# Patient Record
Sex: MALE
[De-identification: names, ages, dates, MRNs, and addresses within clinical notes are randomized per-mention and may not be internally consistent; named-entity substitution may affect disease eponyms.]

---

## 2016-07-05 RX ORDER — LACTULOSE 10 G/15ML
30 SOLUTION ORAL
Qty: 1125 | Refills: 0 | COMMUNITY
Start: 2016-07-05 | End: 2016-08-04

## 2017-01-12 ENCOUNTER — APPOINTMENT (OUTPATIENT)
Dept: NEPHROLOGY | Facility: CLINIC | Age: 72
End: 2017-01-12

## 2017-01-12 VITALS — BODY MASS INDEX: 23.85 KG/M2 | WEIGHT: 182 LBS

## 2017-01-12 VITALS — DIASTOLIC BLOOD PRESSURE: 56 MMHG | SYSTOLIC BLOOD PRESSURE: 103 MMHG

## 2017-01-12 VITALS — DIASTOLIC BLOOD PRESSURE: 45 MMHG | HEART RATE: 79 BPM | SYSTOLIC BLOOD PRESSURE: 90 MMHG

## 2017-01-13 ENCOUNTER — OUTPATIENT (OUTPATIENT)
Dept: OUTPATIENT SERVICES | Facility: HOSPITAL | Age: 72
LOS: 1 days | End: 2017-01-13
Payer: COMMERCIAL

## 2017-01-13 DIAGNOSIS — Z90.49 ACQUIRED ABSENCE OF OTHER SPECIFIED PARTS OF DIGESTIVE TRACT: Chronic | ICD-10-CM

## 2017-01-13 DIAGNOSIS — R06.02 SHORTNESS OF BREATH: ICD-10-CM

## 2017-01-13 PROCEDURE — 93018 CV STRESS TEST I&R ONLY: CPT

## 2017-01-13 PROCEDURE — 93016 CV STRESS TEST SUPVJ ONLY: CPT

## 2017-01-13 PROCEDURE — A9505: CPT

## 2017-01-13 PROCEDURE — 93017 CV STRESS TEST TRACING ONLY: CPT

## 2017-01-13 PROCEDURE — 78452 HT MUSCLE IMAGE SPECT MULT: CPT | Mod: 26

## 2017-01-13 PROCEDURE — A9500: CPT

## 2017-01-13 PROCEDURE — 78452 HT MUSCLE IMAGE SPECT MULT: CPT

## 2017-02-02 ENCOUNTER — APPOINTMENT (OUTPATIENT)
Dept: NEPHROLOGY | Facility: CLINIC | Age: 72
End: 2017-02-02

## 2017-02-02 VITALS — HEART RATE: 75 BPM | DIASTOLIC BLOOD PRESSURE: 51 MMHG | SYSTOLIC BLOOD PRESSURE: 101 MMHG

## 2017-02-02 VITALS — HEIGHT: 60 IN | DIASTOLIC BLOOD PRESSURE: 63 MMHG | SYSTOLIC BLOOD PRESSURE: 113 MMHG

## 2017-03-02 ENCOUNTER — APPOINTMENT (OUTPATIENT)
Dept: HEART AND VASCULAR | Facility: CLINIC | Age: 72
End: 2017-03-02

## 2017-03-02 ENCOUNTER — APPOINTMENT (OUTPATIENT)
Dept: NEPHROLOGY | Facility: CLINIC | Age: 72
End: 2017-03-02

## 2017-03-02 VITALS — SYSTOLIC BLOOD PRESSURE: 116 MMHG | DIASTOLIC BLOOD PRESSURE: 64 MMHG

## 2017-03-02 VITALS — HEART RATE: 75 BPM | DIASTOLIC BLOOD PRESSURE: 54 MMHG | SYSTOLIC BLOOD PRESSURE: 107 MMHG

## 2017-03-02 VITALS — BODY MASS INDEX: 922.79 KG/M2 | WEIGHT: 189 LBS

## 2017-03-07 RX ORDER — CHLORHEXIDINE GLUCONATE 213 G/1000ML
1 SOLUTION TOPICAL ONCE
Qty: 0 | Refills: 0 | Status: DISCONTINUED | OUTPATIENT
Start: 2017-03-08 | End: 2017-03-08

## 2017-03-07 NOTE — H&P ADULT - NSHPREVIEWOFSYSTEMS_GEN_ALL_CORE
GENERAL, CONSTITUTIONAL : denies recent weight loss, Fever, Chills  EYES, VISION: denies changes in vision   EARS, NOSE, THROAT: denies hearing loss  HEART, CARDIOVASCULAR: denies chest pain, arrhythmia, palpitations, SOB,  LE edema, claudication.   RESPIRATORY: +admits cough, Denies cough, SOB, wheezing  GASTROINTESTINAL:  Denies abdominal pain, heartburn, bloody stool.   GENITOURINARY: Denies frequent urination, urgency  MUSCULOSKELETAL denies joint pain or swelling, restricted motion, musculoskeletal pain.   SKIN & INTEGUMENTARY Denies rashes, sores, blisters, blisters, growths.  NEUROLOGICAL: Denies numbness or tingling sensations, sensation loss, burning.   PSYCHIATRIC: Denies nervousness, anxiety, depression  ENDOCRINE Denies heat or cold intolerance, excessive thirst  HEMATOLOGIC/LYMPHATIC: Denies abnormal bleeding, bleeding of any kind GENERAL, CONSTITUTIONAL : denies recent weight loss, Fever, Chills  EYES, VISION: denies changes in vision   EARS, NOSE, THROAT: denies hearing loss  HEART, CARDIOVASCULAR: +SOB/pleuritic chest discomfort, denies arrhythmia, palpitations, LE edema, claudication.   RESPIRATORY: +admits cough, Denies wheezing  GASTROINTESTINAL:  Denies abdominal pain, heartburn, bloody stool.   GENITOURINARY: Denies frequent urination, urgency  MUSCULOSKELETAL denies joint pain or swelling, restricted motion, musculoskeletal pain.   SKIN & INTEGUMENTARY Denies rashes, sores, blisters, blisters, growths.  NEUROLOGICAL: Denies numbness or tingling sensations, sensation loss, burning.   PSYCHIATRIC: Denies nervousness, anxiety, depression  ENDOCRINE Denies heat or cold intolerance, excessive thirst  HEMATOLOGIC/LYMPHATIC: Denies abnormal bleeding, bleeding of any kind

## 2017-03-07 NOTE — H&P ADULT - HISTORY OF PRESENT ILLNESS
SKELETON* SKELETON*             71 y/o M with PMH of PMH of cirrhosis (unknown etiology), splenomegaly, Crohn's disease, COPD, recently hospitalized with a nonanion gap metabolic acidosis 2/2 LILIAN on CKD with diarrhea complicated by UTI with enterococcus that was successfully treated @ [facility] (7/2017) 70 year old male, former smoker, with pmHx of cirrhosis (unknown etiology), prediabetic, amaurosis fugax (2 episodes, most recently years ago, not sure if 2/2 TIA or plaque per pt) splenomegaly, Crohn's disease (no recent exacerbations), loop recorder implant (Dr Hatfield in 2015), COPD (recently had thoracic surgery 12/1/2016 @ [place], 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 (7/2017) presented to his cardiologist for pre-operative clearance for a pending liver transplant. Pt underwent a Lexiscan-NST (1/13/17) which revealed normal perfusion and an overall left ventricular systolic function that is normal, EKG stress test is normal. Despite a normal stress test, patient continues to have some fatigue when walking fast and it resolves when he slows down, he attributes likely to his baseline COPD. As per patient's wife, pt is undergoing a left cardiac catheterization by request of the [facility] Transplant List to be considered a liver transplant. 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 Pt underwent a Lexiscan-NST (1/13/17) which revealed normal perfusion and an overall left ventricular systolic function that is normal, EKG stress test is normal. Despite a normal stress test, patient continues to have some fatigue when walking fast and it resolves when he slows down, he attributes likely to his baseline COPD. As per patient's wife, pt is undergoing a left cardiac catheterization by request of the [facility] Transplant List to be considered a liver transplant. Admits to leg swelling, ascites. Pt denies CP, SOB, palpitations, dizziness, syncope, known CAD, melena, hematochezia, fevers/chills.    Of note: pt endorses a chronic cough with minimal sputum. Pt states it improved after his recent thoracotomy surgery and after course of Z pack and prednisone he finished last week. Denies fevers/chills, recent sick contacts. Pt states he was worked up for TB @ [place] and results were negative.    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 Pt underwent a Lexiscan-NST (1/13/17) which revealed normal perfusion and an overall left ventricular systolic function that is normal, EKG stress test is normal. Despite a normal stress test, patient continues to have some fatigue when walking fast and it resolves when he slows down, he attributes likely to his baseline COPD. Patient states he can barely walk up 1 flight of stairs at a slow pace prior to becoming fatigued and SOB. As per patient's wife, pt is undergoing a left cardiac catheterization by request of the [facility] Transplant List to be considered a liver transplant. Admits to leg swelling, ascites. Pt denies CP, SOB, palpitations, dizziness, syncope, known CAD, melena, hematochezia, fevers/chills. In light of patients risk factors, CCS Angina Class III equivalent symptoms despite normal NST, patient now presents for recommended left heart catheterization prior to clearance for liver transplant surgery.      Of note: pt endorses a chronic cough with minimal sputum. Pt states it improved after his recent thoracotomy surgery and after course of Z pack and prednisone he finished last week. Denies fevers/chills, recent sick contacts. Pt states he was worked up for TB @ [place] and results were negative.    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 Pt underwent a Lexiscan-NST (1/13/17) which revealed normal perfusion and an overall left ventricular systolic function that is normal, EKG stress test is normal. Despite a normal stress test, patient continues to have some fatigue when walking fast and it resolves when he slows down, he attributes likely to his baseline COPD. Patient states he can barely walk up 1 flight of stairs at a slow pace prior to becoming fatigued and SOB. As per patient's wife, pt is undergoing a left cardiac catheterization by request of the [facility] Transplant List to be considered a liver transplant. Admits to leg swelling, ascites. Pt denies CP, SOB, palpitations, dizziness, syncope, known CAD, melena, hematochezia, fevers/chills. In light of patients risk factors, CCS Angina Class III equivalent symptoms despite normal NST, patient now presents for recommended left heart catheterization prior to clearance for liver transplant surgery.      Of note: pt endorses a chronic cough with minimal sputum. Pt states it improved after his recent thoracotomy surgery and after course of Z pack and prednisone he finished last week. Denies fevers/chills, recent sick contacts. Pt states he was worked up for TB @ [place] and results were negative.    Echo (4/2016): 60-65%

## 2017-03-07 NOTE — H&P ADULT - NSHPPHYSICALEXAM_GEN_ALL_CORE
Height: 6'2"   Weight: 190lbs      Appearance: well appearing elderly male in NAD  HEENT:   Normal oral mucosa, PERRL, EOMI	  Neck: Supple, no JVD  Cardiovascular: [id number]  Respiratory: CTA B/L, no wheezing/rhonchi noted	  Gastrointestinal:  + BS, soft, NT/ND  Skin: No rashes, No ecchymoses, No cyanosis  Extremities: warm well perfused, no pedal edema  Vascular: Femoral pulses 2+ b/l without bruit, DP 1+ b/l, PT 1+ b/l  Neurologic: no focal neurodeficits noted Height: 6'2"   Weight: 190lbs    BP: 114/55  HR: 80  RR: 16  Temp: 98.2F  O2 sat: 97%    Appearance: well appearing elderly male in NAD  HEENT:   Normal oral mucosa, PERRL, EOMI	  Neck: Supple, no JVD  Cardiovascular: [id number]  Respiratory: CTA B/L with distant BS at bases, no wheezing/rhonchi noted	  Gastrointestinal:  + BS, soft, NT/ND  Skin: No rashes, No ecchymoses, No cyanosis  Extremities: warm well perfused, no pedal edema  Vascular: Femoral pulses 2+ b/l without bruit, DP 1+ b/l, PT 2+ b/l  Neurologic: no focal neurodeficits noted

## 2017-03-07 NOTE — H&P ADULT - PROBLEM SELECTOR PLAN 2
baseline Cr unknown, Cr today.....  will hydrate with NS@100cc/hr prior to procedure. baseline Cr unknown, BUN/Cr 36/1.43, Dr. Moreno made aware. Will hydrate with NS@100cc/hr prior to procedure.

## 2017-03-07 NOTE — H&P ADULT - PMH
Chronic obstructive bronchitis    Cirrhosis    Crohn disease    H/O squamous cell carcinoma    Other ascites    Spleen enlarged Carotid artery disease    Chronic obstructive bronchitis    Cirrhosis    Crohn disease    H/O squamous cell carcinoma    Other ascites    Spleen enlarged

## 2017-03-07 NOTE — H&P ADULT - ASSESSMENT
70 yr old M former smoker with PMHx of prediabetes, carotid artery disease, COPD, CKD, cirrhosis awaiting placement on transplant list, with CCS Angina Class III equivalent symptoms despite recent normal NST, who now presents for recommended cardiac catheterization for further evaluation.    ASA: III              Mallampati: II 70 yr old M former smoker with PMHx of prediabetes, carotid artery disease, COPD, CKD, cirrhosis awaiting placement on transplant list, thrombocytopenia with CCS Angina Class III equivalent symptoms despite recent normal NST, who now presents for recommended cardiac catheterization for further evaluation.    ASA: III              Mallampati: II

## 2017-03-08 ENCOUNTER — OUTPATIENT (OUTPATIENT)
Dept: OUTPATIENT SERVICES | Facility: HOSPITAL | Age: 72
LOS: 1 days | Discharge: NURSING, REHAB OR LONG-TERM CARE | End: 2017-03-08
Payer: COMMERCIAL

## 2017-03-08 DIAGNOSIS — Z98.890 OTHER SPECIFIED POSTPROCEDURAL STATES: Chronic | ICD-10-CM

## 2017-03-08 DIAGNOSIS — D69.6 THROMBOCYTOPENIA, UNSPECIFIED: ICD-10-CM

## 2017-03-08 DIAGNOSIS — I20.9 ANGINA PECTORIS, UNSPECIFIED: ICD-10-CM

## 2017-03-08 DIAGNOSIS — N18.9 CHRONIC KIDNEY DISEASE, UNSPECIFIED: ICD-10-CM

## 2017-03-08 DIAGNOSIS — Z90.49 ACQUIRED ABSENCE OF OTHER SPECIFIED PARTS OF DIGESTIVE TRACT: Chronic | ICD-10-CM

## 2017-03-08 LAB
ALBUMIN SERPL ELPH-MCNC: 2.5 G/DL — LOW (ref 3.4–5)
ALP SERPL-CCNC: 118 U/L — SIGNIFICANT CHANGE UP (ref 40–120)
ALT FLD-CCNC: 52 U/L — HIGH (ref 12–42)
ANION GAP SERPL CALC-SCNC: 10 MMOL/L — SIGNIFICANT CHANGE UP (ref 9–16)
APTT BLD: 31 SEC — SIGNIFICANT CHANGE UP (ref 27.5–37.4)
AST SERPL-CCNC: 24 U/L — SIGNIFICANT CHANGE UP (ref 15–37)
BASOPHILS NFR BLD AUTO: 0.3 % — SIGNIFICANT CHANGE UP (ref 0–2)
BILIRUB DIRECT SERPL-MCNC: 0.42 MG/DL — HIGH
BILIRUB INDIRECT FLD-MCNC: 1.6 MG/DL — HIGH (ref 0.2–1)
BILIRUB SERPL-MCNC: 2 MG/DL — HIGH (ref 0.2–1.2)
BUN SERPL-MCNC: 36 MG/DL — HIGH (ref 7–23)
CALCIUM SERPL-MCNC: 8.6 MG/DL — SIGNIFICANT CHANGE UP (ref 8.5–10.5)
CHLORIDE SERPL-SCNC: 109 MMOL/L — HIGH (ref 96–108)
CHOLEST SERPL-MCNC: 104 MG/DL — SIGNIFICANT CHANGE UP
CK MB CFR SERPL CALC: 3.1 NG/ML — SIGNIFICANT CHANGE UP (ref 0.5–3.6)
CK SERPL-CCNC: 122 U/L — SIGNIFICANT CHANGE UP (ref 39–308)
CO2 SERPL-SCNC: 21 MMOL/L — LOW (ref 22–31)
CREAT SERPL-MCNC: 1.43 MG/DL — HIGH (ref 0.5–1.3)
CRP SERPL-MCNC: 0.44 MG/DL — HIGH
EOSINOPHIL NFR BLD AUTO: 4.2 % — SIGNIFICANT CHANGE UP (ref 0–6)
GLUCOSE SERPL-MCNC: 169 MG/DL — HIGH (ref 70–99)
HBA1C BLD-MCNC: 6.1 % — HIGH (ref 4.8–5.6)
HCT VFR BLD CALC: 31.9 % — LOW (ref 39–50)
HDLC SERPL-MCNC: 49 MG/DL — SIGNIFICANT CHANGE UP
HGB BLD-MCNC: 10.8 G/DL — LOW (ref 13–17)
LIPID PNL WITH DIRECT LDL SERPL: 44 MG/DL — SIGNIFICANT CHANGE UP
LYMPHOCYTES # BLD AUTO: 17.7 % — SIGNIFICANT CHANGE UP (ref 13–44)
MCHC RBC-ENTMCNC: 33.4 PG — SIGNIFICANT CHANGE UP (ref 27–34)
MCHC RBC-ENTMCNC: 33.9 G/DL — SIGNIFICANT CHANGE UP (ref 32–36)
MCV RBC AUTO: 98.8 FL — SIGNIFICANT CHANGE UP (ref 80–100)
MONOCYTES NFR BLD AUTO: 8.5 % — SIGNIFICANT CHANGE UP (ref 2–14)
NEUTROPHILS NFR BLD AUTO: 69.3 % — SIGNIFICANT CHANGE UP (ref 43–77)
PLATELET # BLD AUTO: 56 K/UL — LOW (ref 150–400)
POTASSIUM SERPL-MCNC: 4.3 MMOL/L — SIGNIFICANT CHANGE UP (ref 3.5–5.3)
POTASSIUM SERPL-SCNC: 4.3 MMOL/L — SIGNIFICANT CHANGE UP (ref 3.5–5.3)
PROT SERPL-MCNC: 6.5 G/DL — SIGNIFICANT CHANGE UP (ref 6.4–8.2)
RBC # BLD: 3.23 M/UL — LOW (ref 4.2–5.8)
RBC # FLD: 16 % — SIGNIFICANT CHANGE UP (ref 10.3–16.9)
SODIUM SERPL-SCNC: 140 MMOL/L — SIGNIFICANT CHANGE UP (ref 135–145)
TOTAL CHOLESTEROL/HDL RATIO MEASUREMENT: 2.1 RATIO — SIGNIFICANT CHANGE UP
TRIGL SERPL-MCNC: 55 MG/DL — SIGNIFICANT CHANGE UP
WBC # BLD: 3.6 K/UL — LOW (ref 3.8–10.5)
WBC # FLD AUTO: 3.6 K/UL — LOW (ref 3.8–10.5)

## 2017-03-08 PROCEDURE — 86140 C-REACTIVE PROTEIN: CPT

## 2017-03-08 PROCEDURE — C1760: CPT

## 2017-03-08 PROCEDURE — C1889: CPT

## 2017-03-08 PROCEDURE — 85730 THROMBOPLASTIN TIME PARTIAL: CPT

## 2017-03-08 PROCEDURE — 80076 HEPATIC FUNCTION PANEL: CPT

## 2017-03-08 PROCEDURE — 83036 HEMOGLOBIN GLYCOSYLATED A1C: CPT

## 2017-03-08 PROCEDURE — 80048 BASIC METABOLIC PNL TOTAL CA: CPT

## 2017-03-08 PROCEDURE — C1894: CPT

## 2017-03-08 PROCEDURE — 82553 CREATINE MB FRACTION: CPT

## 2017-03-08 PROCEDURE — 93458 L HRT ARTERY/VENTRICLE ANGIO: CPT | Mod: 26

## 2017-03-08 PROCEDURE — 82550 ASSAY OF CK (CPK): CPT

## 2017-03-08 PROCEDURE — 93010 ELECTROCARDIOGRAM REPORT: CPT

## 2017-03-08 PROCEDURE — 80061 LIPID PANEL: CPT

## 2017-03-08 PROCEDURE — 93005 ELECTROCARDIOGRAM TRACING: CPT

## 2017-03-08 PROCEDURE — 85025 COMPLETE CBC W/AUTO DIFF WBC: CPT

## 2017-03-08 PROCEDURE — C1769: CPT

## 2017-03-08 PROCEDURE — C1887: CPT

## 2017-03-08 PROCEDURE — 93458 L HRT ARTERY/VENTRICLE ANGIO: CPT

## 2017-03-08 RX ORDER — SODIUM CHLORIDE 9 MG/ML
500 INJECTION INTRAMUSCULAR; INTRAVENOUS; SUBCUTANEOUS
Qty: 0 | Refills: 0 | Status: DISCONTINUED | OUTPATIENT
Start: 2017-03-08 | End: 2017-03-08

## 2017-03-08 RX ORDER — TIOTROPIUM BROMIDE 18 UG/1
1 CAPSULE ORAL; RESPIRATORY (INHALATION)
Qty: 0 | Refills: 0 | COMMUNITY

## 2017-03-08 RX ORDER — CHOLECALCIFEROL (VITAMIN D3) 125 MCG
1 CAPSULE ORAL
Qty: 0 | Refills: 0 | COMMUNITY

## 2017-03-08 RX ORDER — MESALAMINE 400 MG
1 TABLET, DELAYED RELEASE (ENTERIC COATED) ORAL
Qty: 0 | Refills: 0 | COMMUNITY

## 2017-03-08 RX ORDER — MAGNESIUM OXIDE 400 MG ORAL TABLET 241.3 MG
1 TABLET ORAL
Qty: 0 | Refills: 0 | COMMUNITY

## 2017-03-08 RX ORDER — SODIUM BICARBONATE 1 MEQ/ML
1 SYRINGE (ML) INTRAVENOUS
Qty: 0 | Refills: 0 | COMMUNITY

## 2017-03-08 RX ADMIN — SODIUM CHLORIDE 100 MILLILITER(S): 9 INJECTION INTRAMUSCULAR; INTRAVENOUS; SUBCUTANEOUS at 08:04

## 2017-03-08 NOTE — PROGRESS NOTE ADULT - SUBJECTIVE AND OBJECTIVE BOX
Procedure: LHC, Angioseal  Indication: CAD, +EST  Complication: none    Result:  1) Stable single vessel coronary artery disease (50% mLAD)  2) Normal LVEDP 6    Plan: Medical management. D/C home. To f/u with Dr. Rodas.

## 2017-03-16 DIAGNOSIS — R94.39 ABNORMAL RESULT OF OTHER CARDIOVASCULAR FUNCTION STUDY: ICD-10-CM

## 2017-03-16 DIAGNOSIS — Z01.810 ENCOUNTER FOR PREPROCEDURAL CARDIOVASCULAR EXAMINATION: ICD-10-CM

## 2017-03-16 DIAGNOSIS — J98.4 OTHER DISORDERS OF LUNG: ICD-10-CM

## 2017-03-16 DIAGNOSIS — I67.9 CEREBROVASCULAR DISEASE, UNSPECIFIED: ICD-10-CM

## 2017-03-16 DIAGNOSIS — I25.110 ATHEROSCLEROTIC HEART DISEASE OF NATIVE CORONARY ARTERY WITH UNSTABLE ANGINA PECTORIS: ICD-10-CM

## 2017-04-06 ENCOUNTER — APPOINTMENT (OUTPATIENT)
Dept: NEPHROLOGY | Facility: CLINIC | Age: 72
End: 2017-04-06

## 2017-04-06 VITALS — HEART RATE: 71 BPM | SYSTOLIC BLOOD PRESSURE: 106 MMHG | DIASTOLIC BLOOD PRESSURE: 56 MMHG

## 2017-04-06 VITALS — SYSTOLIC BLOOD PRESSURE: 110 MMHG | HEART RATE: 75 BPM | DIASTOLIC BLOOD PRESSURE: 69 MMHG

## 2017-04-20 ENCOUNTER — APPOINTMENT (OUTPATIENT)
Dept: OPHTHALMOLOGY | Facility: CLINIC | Age: 72
End: 2017-04-20

## 2017-05-04 ENCOUNTER — APPOINTMENT (OUTPATIENT)
Dept: NEPHROLOGY | Facility: CLINIC | Age: 72
End: 2017-05-04

## 2017-05-04 VITALS — SYSTOLIC BLOOD PRESSURE: 122 MMHG | DIASTOLIC BLOOD PRESSURE: 64 MMHG

## 2017-05-04 VITALS — HEART RATE: 66 BPM | SYSTOLIC BLOOD PRESSURE: 101 MMHG | DIASTOLIC BLOOD PRESSURE: 53 MMHG

## 2017-05-05 ENCOUNTER — DOCUMENTATION ONLY (OUTPATIENT)
Dept: TRANSPLANT | Facility: CLINIC | Age: 72
End: 2017-05-05

## 2017-06-01 ENCOUNTER — APPOINTMENT (OUTPATIENT)
Dept: NEPHROLOGY | Facility: CLINIC | Age: 72
End: 2017-06-01

## 2017-06-09 ENCOUNTER — APPOINTMENT (OUTPATIENT)
Dept: NEPHROLOGY | Facility: CLINIC | Age: 72
End: 2017-06-09

## 2017-06-09 VITALS — BODY MASS INDEX: 922.79 KG/M2 | WEIGHT: 189 LBS

## 2017-06-09 VITALS — SYSTOLIC BLOOD PRESSURE: 95 MMHG | DIASTOLIC BLOOD PRESSURE: 46 MMHG | HEART RATE: 68 BPM

## 2017-06-09 VITALS — DIASTOLIC BLOOD PRESSURE: 57 MMHG | SYSTOLIC BLOOD PRESSURE: 102 MMHG

## 2017-07-19 ENCOUNTER — LABORATORY RESULT (OUTPATIENT)
Age: 72
End: 2017-07-19

## 2017-07-20 ENCOUNTER — APPOINTMENT (OUTPATIENT)
Dept: NEPHROLOGY | Facility: CLINIC | Age: 72
End: 2017-07-20

## 2017-07-20 VITALS — SYSTOLIC BLOOD PRESSURE: 96 MMHG | DIASTOLIC BLOOD PRESSURE: 45 MMHG | HEART RATE: 65 BPM

## 2017-07-20 VITALS — BODY MASS INDEX: 932.56 KG/M2 | WEIGHT: 191 LBS

## 2017-07-20 VITALS — DIASTOLIC BLOOD PRESSURE: 51 MMHG | SYSTOLIC BLOOD PRESSURE: 103 MMHG

## 2017-07-20 DIAGNOSIS — N62 HYPERTROPHY OF BREAST: ICD-10-CM

## 2017-07-20 RX ORDER — SPIRONOLACTONE 25 MG/1
25 TABLET ORAL DAILY
Qty: 90 | Refills: 0 | Status: COMPLETED | COMMUNITY
Start: 2017-02-02 | End: 2017-07-20

## 2017-08-01 LAB
ALBUMIN SERPL ELPH-MCNC: 3.2 G/DL
ALP BLD-CCNC: 101 U/L
ALT SERPL-CCNC: 28 U/L
ANION GAP SERPL CALC-SCNC: 10 MMOL/L
APPEARANCE: CLEAR
AST SERPL-CCNC: 32 U/L
BASOPHILS # BLD AUTO: 0.01 K/UL
BASOPHILS NFR BLD AUTO: 0.4 %
BILIRUB SERPL-MCNC: 1.3 MG/DL
BILIRUBIN URINE: NEGATIVE
BLOOD URINE: ABNORMAL
BSA DERIVED: 1.73 M2
BUN SERPL-MCNC: 36 MG/DL
CALCIUM SERPL-MCNC: 9.3 MG/DL
CHLORIDE SERPL-SCNC: 108 MMOL/L
CO2 SERPL-SCNC: 19 MMOL/L
COLOR: YELLOW
CREAT 24H UR-MCNC: 1.1 G/24 H
CREAT 24H UR-MCNC: 1.1 G/24 H
CREAT ?TM UR-MCNC: 52 MG/DL
CREAT ?TM UR-MCNC: 52 MG/DL
CREAT CL 24H UR+SERPL-VRATE: 45 ML/MIN
CREAT SERPL-MCNC: 1.65 MG/DL
CREAT SERPL-MCNC: 1.65 MG/DL
CREAT SPEC-SCNC: 49 MG/DL
CYSTATIN C SERPL-MCNC: 2.23 MG/L
EOSINOPHIL # BLD AUTO: 0.16 K/UL
EOSINOPHIL NFR BLD AUTO: 5.7 %
GFR/BSA.PRED SERPLBLD CYS-BASED-ARV: 26
GLUCOSE QUALITATIVE U: NORMAL MG/DL
GLUCOSE SERPL-MCNC: 145 MG/DL
HCT VFR BLD CALC: 30.5 %
HGB BLD-MCNC: 10.3 G/DL
IMM GRANULOCYTES NFR BLD AUTO: 0 %
KETONES URINE: NEGATIVE
LEUKOCYTE ESTERASE URINE: NEGATIVE
LYMPHOCYTES # BLD AUTO: 0.58 K/UL
LYMPHOCYTES NFR BLD AUTO: 20.7 %
MAN DIFF?: NORMAL
MCHC RBC-ENTMCNC: 33.8 GM/DL
MCHC RBC-ENTMCNC: 34.6 PG
MCV RBC AUTO: 102.3 FL
MICROALBUMIN 24H UR DL<=1MG/L-MCNC: 0.5 MG/DL
MICROALBUMIN/CREAT 24H UR-RTO: 10 MG/G
MONOCYTES # BLD AUTO: 0.22 K/UL
MONOCYTES NFR BLD AUTO: 7.9 %
NEUTROPHILS # BLD AUTO: 1.83 K/UL
NEUTROPHILS NFR BLD AUTO: 65.3 %
NITRITE URINE: NEGATIVE
PH URINE: 5.5
PHOSPHATE SERPL-MCNC: 3.3 MG/DL
PLATELET # BLD AUTO: 68 K/UL
POTASSIUM SERPL-SCNC: 4.7 MMOL/L
PROT ?TM UR-MCNC: 24 HR
PROT ?TM UR-MCNC: 24 HR
PROT SERPL-MCNC: 6.6 G/DL
PROTEIN URINE: NEGATIVE MG/DL
RBC # BLD: 2.98 M/UL
RBC # FLD: 17.2 %
SODIUM SERPL-SCNC: 137 MMOL/L
SPECIFIC GRAVITY URINE: 1.01
SPECIMEN VOL 24H UR: 2050 ML
SPECIMEN VOL 24H UR: 2050 ML
UROBILINOGEN URINE: NORMAL MG/DL
WBC # FLD AUTO: 2.8 K/UL

## 2017-09-14 ENCOUNTER — APPOINTMENT (OUTPATIENT)
Dept: NEPHROLOGY | Facility: CLINIC | Age: 72
End: 2017-09-14
Payer: COMMERCIAL

## 2017-09-14 VITALS — BODY MASS INDEX: 966.74 KG/M2 | WEIGHT: 198 LBS

## 2017-09-14 VITALS — DIASTOLIC BLOOD PRESSURE: 72 MMHG | SYSTOLIC BLOOD PRESSURE: 101 MMHG | HEART RATE: 72 BPM

## 2017-09-14 PROCEDURE — 99214 OFFICE O/P EST MOD 30 MIN: CPT | Mod: 25

## 2017-09-14 RX ORDER — EPLERENONE 25 MG/1
25 TABLET, COATED ORAL
Qty: 180 | Refills: 3 | Status: DISCONTINUED | COMMUNITY
Start: 2017-07-20 | End: 2017-09-14

## 2017-11-16 ENCOUNTER — APPOINTMENT (OUTPATIENT)
Dept: NEPHROLOGY | Facility: CLINIC | Age: 72
End: 2017-11-16
Payer: COMMERCIAL

## 2017-11-16 VITALS — DIASTOLIC BLOOD PRESSURE: 61 MMHG | HEART RATE: 72 BPM | SYSTOLIC BLOOD PRESSURE: 95 MMHG

## 2017-11-16 DIAGNOSIS — K92.2 GASTROINTESTINAL HEMORRHAGE, UNSPECIFIED: ICD-10-CM

## 2017-11-16 PROCEDURE — 99215 OFFICE O/P EST HI 40 MIN: CPT | Mod: 25

## 2018-01-25 ENCOUNTER — APPOINTMENT (OUTPATIENT)
Dept: NEPHROLOGY | Facility: CLINIC | Age: 73
End: 2018-01-25
Payer: COMMERCIAL

## 2018-01-25 VITALS — DIASTOLIC BLOOD PRESSURE: 57 MMHG | SYSTOLIC BLOOD PRESSURE: 126 MMHG

## 2018-01-25 VITALS — BODY MASS INDEX: 986.26 KG/M2 | WEIGHT: 202 LBS

## 2018-01-25 VITALS — DIASTOLIC BLOOD PRESSURE: 48 MMHG | SYSTOLIC BLOOD PRESSURE: 101 MMHG | HEART RATE: 65 BPM

## 2018-01-25 PROCEDURE — 99214 OFFICE O/P EST MOD 30 MIN: CPT

## 2018-01-25 RX ORDER — MAGNESIUM OXIDE 241.3 MG/1000MG
400 TABLET ORAL TWICE DAILY
Qty: 180 | Refills: 3 | Status: ACTIVE | COMMUNITY
Start: 2018-01-25 | End: 1900-01-01

## 2018-02-27 ENCOUNTER — RX RENEWAL (OUTPATIENT)
Age: 73
End: 2018-02-27

## 2018-02-27 RX ORDER — SPIRONOLACTONE 50 MG/1
50 TABLET ORAL
Qty: 90 | Refills: 0 | Status: DISCONTINUED | COMMUNITY
End: 2018-02-27

## 2018-04-18 ENCOUNTER — APPOINTMENT (OUTPATIENT)
Dept: NEPHROLOGY | Facility: CLINIC | Age: 73
End: 2018-04-18
Payer: COMMERCIAL

## 2018-04-18 VITALS — WEIGHT: 199 LBS | BODY MASS INDEX: 971.62 KG/M2

## 2018-04-18 VITALS — SYSTOLIC BLOOD PRESSURE: 91 MMHG | DIASTOLIC BLOOD PRESSURE: 47 MMHG | HEART RATE: 70 BPM

## 2018-04-18 VITALS — DIASTOLIC BLOOD PRESSURE: 56 MMHG | SYSTOLIC BLOOD PRESSURE: 95 MMHG

## 2018-04-18 PROCEDURE — 99215 OFFICE O/P EST HI 40 MIN: CPT

## 2018-05-03 ENCOUNTER — APPOINTMENT (OUTPATIENT)
Dept: OPHTHALMOLOGY | Facility: CLINIC | Age: 73
End: 2018-05-03

## 2018-06-14 ENCOUNTER — APPOINTMENT (OUTPATIENT)
Dept: NEPHROLOGY | Facility: CLINIC | Age: 73
End: 2018-06-14

## 2018-06-19 ENCOUNTER — APPOINTMENT (OUTPATIENT)
Dept: NEPHROLOGY | Facility: CLINIC | Age: 73
End: 2018-06-19
Payer: COMMERCIAL

## 2018-06-19 VITALS — SYSTOLIC BLOOD PRESSURE: 94 MMHG | HEART RATE: 74 BPM | DIASTOLIC BLOOD PRESSURE: 54 MMHG

## 2018-06-19 PROCEDURE — 99215 OFFICE O/P EST HI 40 MIN: CPT

## 2018-07-25 PROBLEM — I77.9 DISORDER OF ARTERIES AND ARTERIOLES, UNSPECIFIED: Chronic | Status: ACTIVE | Noted: 2017-03-08

## 2018-08-28 ENCOUNTER — APPOINTMENT (OUTPATIENT)
Dept: OPHTHALMOLOGY | Facility: CLINIC | Age: 73
End: 2018-08-28
Payer: COMMERCIAL

## 2018-08-28 PROCEDURE — 92014 COMPRE OPH EXAM EST PT 1/>: CPT

## 2018-08-31 ENCOUNTER — APPOINTMENT (OUTPATIENT)
Dept: NEPHROLOGY | Facility: CLINIC | Age: 73
End: 2018-08-31
Payer: COMMERCIAL

## 2018-08-31 VITALS — WEIGHT: 197 LBS | BODY MASS INDEX: 961.85 KG/M2

## 2018-08-31 VITALS — SYSTOLIC BLOOD PRESSURE: 105 MMHG | DIASTOLIC BLOOD PRESSURE: 49 MMHG | HEART RATE: 65 BPM

## 2018-08-31 PROCEDURE — 99215 OFFICE O/P EST HI 40 MIN: CPT

## 2018-08-31 RX ORDER — SODIUM BICARBONATE
POWDER (GRAM) MISCELLANEOUS
Qty: 90 | Refills: 0 | Status: ACTIVE | COMMUNITY
Start: 2017-02-02

## 2018-11-08 ENCOUNTER — APPOINTMENT (OUTPATIENT)
Dept: NEPHROLOGY | Facility: CLINIC | Age: 73
End: 2018-11-08
Payer: COMMERCIAL

## 2018-11-08 PROCEDURE — 99214 OFFICE O/P EST MOD 30 MIN: CPT

## 2018-11-20 ENCOUNTER — APPOINTMENT (OUTPATIENT)
Dept: OPHTHALMOLOGY | Facility: CLINIC | Age: 73
End: 2018-11-20
Payer: COMMERCIAL

## 2018-11-20 PROCEDURE — 92014 COMPRE OPH EXAM EST PT 1/>: CPT

## 2018-12-28 ENCOUNTER — APPOINTMENT (OUTPATIENT)
Dept: NEPHROLOGY | Facility: CLINIC | Age: 73
End: 2018-12-28
Payer: COMMERCIAL

## 2018-12-28 VITALS — BODY MASS INDEX: 947.2 KG/M2 | WEIGHT: 194 LBS

## 2018-12-28 VITALS — DIASTOLIC BLOOD PRESSURE: 57 MMHG | SYSTOLIC BLOOD PRESSURE: 106 MMHG

## 2018-12-28 VITALS — SYSTOLIC BLOOD PRESSURE: 98 MMHG | DIASTOLIC BLOOD PRESSURE: 44 MMHG | HEART RATE: 62 BPM

## 2018-12-28 PROCEDURE — 99214 OFFICE O/P EST MOD 30 MIN: CPT

## 2019-01-24 ENCOUNTER — APPOINTMENT (OUTPATIENT)
Dept: NEPHROLOGY | Facility: CLINIC | Age: 74
End: 2019-01-24
Payer: COMMERCIAL

## 2019-01-24 VITALS — DIASTOLIC BLOOD PRESSURE: 72 MMHG | HEART RATE: 72 BPM | SYSTOLIC BLOOD PRESSURE: 98 MMHG

## 2019-01-24 VITALS — BODY MASS INDEX: 961.85 KG/M2 | WEIGHT: 197 LBS

## 2019-01-24 DIAGNOSIS — G93.40 ENCEPHALOPATHY, UNSPECIFIED: ICD-10-CM

## 2019-01-24 DIAGNOSIS — K76.9 LIVER DISEASE, UNSPECIFIED: ICD-10-CM

## 2019-01-24 DIAGNOSIS — E87.2 ACIDOSIS: ICD-10-CM

## 2019-01-24 PROCEDURE — 99214 OFFICE O/P EST MOD 30 MIN: CPT

## 2019-01-24 NOTE — PHYSICAL EXAM
[General Appearance - Alert] : alert [General Appearance - In No Acute Distress] : in no acute distress [Sclera] : the sclera and conjunctiva were normal [PERRL With Normal Accommodation] : pupils were equal in size, round, and reactive to light [Extraocular Movements] : extraocular movements were intact [Outer Ear] : the ears and nose were normal in appearance [Oropharynx] : the oropharynx was normal [Neck Appearance] : the appearance of the neck was normal [Neck Cervical Mass (___cm)] : no neck mass was observed [Jugular Venous Distention Increased] : there was no jugular-venous distention [Thyroid Diffuse Enlargement] : the thyroid was not enlarged [Thyroid Nodule] : there were no palpable thyroid nodules [Respiration, Rhythm And Depth] : normal respiratory rhythm and effort [Heart Rate And Rhythm] : heart rate was normal and rhythm regular [Heart Sounds] : normal S1 and S2 [Heart Sounds Gallop] : no gallops [Murmurs] : no murmurs [Heart Sounds Pericardial Friction Rub] : no pericardial rub [Veins - Varicosity Changes] : there were no varicosital changes [Bowel Sounds] : normal bowel sounds [Abdomen Soft] : soft [Abdomen Tenderness] : non-tender [Abdomen Mass (___ Cm)] : no abdominal mass palpated [Cervical Lymph Nodes Enlarged Posterior Bilaterally] : posterior cervical [Cervical Lymph Nodes Enlarged Anterior Bilaterally] : anterior cervical [Supraclavicular Lymph Nodes Enlarged Bilaterally] : supraclavicular [Abnormal Walk] : normal gait [Nail Clubbing] : no clubbing  or cyanosis of the fingernails [Musculoskeletal - Swelling] : no joint swelling seen [Motor Tone] : muscle strength and tone were normal [Skin Color & Pigmentation] : normal skin color and pigmentation [Skin Turgor] : normal skin turgor [] : no rash [Oriented To Time, Place, And Person] : oriented to person, place, and time [Impaired Insight] : insight and judgment were intact [Affect] : the affect was normal [FreeTextEntry1] : spleen and liver palpable. Distended.

## 2019-01-24 NOTE — HISTORY OF PRESENT ILLNESS
[FreeTextEntry1] : •  Encephalopathy controlled. • Hypotension improved, SBP 90 - 100s at home. NO LIGHTHEADEDNESS.  • Bicarbonate 18. * CKD stable, creatinine 1.8. \par \par Spirinolactone: 25 mg altering with 50 mg\par Sodium bicarbonate: 1 teaspoon daily\par \par \par Hepatologist: Genevieve Schafer MD Fax [phone]\par GI: Dr. Crystal\par Urologist: Dr. Tyler Yi [phone]\par Hematologist: Dr. Seng Robles \par Cardiologist: Dr. Casi Montejo\par [place] Nephrologist: Dr. Huerta

## 2019-01-29 ENCOUNTER — APPOINTMENT (OUTPATIENT)
Dept: OPHTHALMOLOGY | Facility: CLINIC | Age: 74
End: 2019-01-29
Payer: COMMERCIAL

## 2019-01-29 DIAGNOSIS — H25.813 COMBINED FORMS OF AGE-RELATED CATARACT, BILATERAL: ICD-10-CM

## 2019-01-29 DIAGNOSIS — H02.88B MEIBOMIAN GLAND DYSFUNCTION RIGHT EYE, UPPER AND LOWER EYELIDS: ICD-10-CM

## 2019-01-29 DIAGNOSIS — H02.88A MEIBOMIAN GLAND DYSFUNCTION RIGHT EYE, UPPER AND LOWER EYELIDS: ICD-10-CM

## 2019-01-29 DIAGNOSIS — H43.813 VITREOUS DEGENERATION, BILATERAL: ICD-10-CM

## 2019-01-29 PROCEDURE — 92014 COMPRE OPH EXAM EST PT 1/>: CPT

## 2019-02-13 ENCOUNTER — OUTPATIENT (OUTPATIENT)
Dept: OUTPATIENT SERVICES | Facility: HOSPITAL | Age: 74
LOS: 1 days | End: 2019-02-13
Payer: COMMERCIAL

## 2019-02-13 DIAGNOSIS — Z01.818 ENCOUNTER FOR OTHER PREPROCEDURAL EXAMINATION: ICD-10-CM

## 2019-02-13 DIAGNOSIS — Z98.890 OTHER SPECIFIED POSTPROCEDURAL STATES: Chronic | ICD-10-CM

## 2019-02-13 DIAGNOSIS — Z90.49 ACQUIRED ABSENCE OF OTHER SPECIFIED PARTS OF DIGESTIVE TRACT: Chronic | ICD-10-CM

## 2019-02-13 PROCEDURE — 93306 TTE W/DOPPLER COMPLETE: CPT | Mod: 26

## 2019-02-13 PROCEDURE — 93306 TTE W/DOPPLER COMPLETE: CPT

## 2019-04-15 ENCOUNTER — INBOUND DOCUMENT (OUTPATIENT)
Age: 74
End: 2019-04-15

## 2019-05-07 ENCOUNTER — APPOINTMENT (OUTPATIENT)
Dept: OPHTHALMOLOGY | Facility: CLINIC | Age: 74
End: 2019-05-07

## 2019-09-11 ENCOUNTER — APPOINTMENT (OUTPATIENT)
Dept: OPHTHALMOLOGY | Facility: CLINIC | Age: 74
End: 2019-09-11
Payer: COMMERCIAL

## 2019-09-11 ENCOUNTER — NON-APPOINTMENT (OUTPATIENT)
Age: 74
End: 2019-09-11

## 2019-09-11 PROCEDURE — 92014 COMPRE OPH EXAM EST PT 1/>: CPT

## 2019-11-25 ENCOUNTER — APPOINTMENT (OUTPATIENT)
Dept: OPHTHALMOLOGY | Facility: CLINIC | Age: 74
End: 2019-11-25
Payer: COMMERCIAL

## 2019-11-25 ENCOUNTER — NON-APPOINTMENT (OUTPATIENT)
Age: 74
End: 2019-11-25

## 2019-11-25 PROCEDURE — 92136 OPHTHALMIC BIOMETRY: CPT | Mod: RT

## 2019-11-25 PROCEDURE — 92014 COMPRE OPH EXAM EST PT 1/>: CPT

## 2020-05-11 ENCOUNTER — NON-APPOINTMENT (OUTPATIENT)
Age: 75
End: 2020-05-11

## 2020-05-12 ENCOUNTER — APPOINTMENT (OUTPATIENT)
Dept: OPHTHALMOLOGY | Facility: CLINIC | Age: 75
End: 2020-05-12
Payer: COMMERCIAL

## 2020-05-12 PROCEDURE — 99212 OFFICE O/P EST SF 10 MIN: CPT | Mod: 95

## 2020-05-13 ENCOUNTER — APPOINTMENT (OUTPATIENT)
Dept: OPHTHALMOLOGY | Facility: AMBULATORY SURGERY CENTER | Age: 75
End: 2020-05-13

## 2020-05-14 ENCOUNTER — APPOINTMENT (OUTPATIENT)
Dept: OPHTHALMOLOGY | Facility: CLINIC | Age: 75
End: 2020-05-14

## 2020-05-18 ENCOUNTER — APPOINTMENT (OUTPATIENT)
Dept: OPHTHALMOLOGY | Facility: CLINIC | Age: 75
End: 2020-05-18

## 2020-07-09 ENCOUNTER — APPOINTMENT (OUTPATIENT)
Dept: OPHTHALMOLOGY | Facility: CLINIC | Age: 75
End: 2020-07-09

## 2020-07-13 ENCOUNTER — APPOINTMENT (OUTPATIENT)
Dept: OPHTHALMOLOGY | Facility: CLINIC | Age: 75
End: 2020-07-13

## 2020-09-22 ENCOUNTER — TRANSCRIPTION ENCOUNTER (OUTPATIENT)
Age: 75
End: 2020-09-22

## 2020-09-23 ENCOUNTER — NON-APPOINTMENT (OUTPATIENT)
Age: 75
End: 2020-09-23

## 2020-09-23 ENCOUNTER — OUTPATIENT (OUTPATIENT)
Dept: OUTPATIENT SERVICES | Facility: HOSPITAL | Age: 75
LOS: 1 days | Discharge: HOME | End: 2020-09-23
Payer: COMMERCIAL

## 2020-09-23 ENCOUNTER — APPOINTMENT (OUTPATIENT)
Dept: OPHTHALMOLOGY | Facility: AMBULATORY SURGERY CENTER | Age: 75
End: 2020-09-23

## 2020-09-23 DIAGNOSIS — Z98.890 OTHER SPECIFIED POSTPROCEDURAL STATES: Chronic | ICD-10-CM

## 2020-09-23 DIAGNOSIS — Z90.49 ACQUIRED ABSENCE OF OTHER SPECIFIED PARTS OF DIGESTIVE TRACT: Chronic | ICD-10-CM

## 2020-09-23 PROCEDURE — 66984 XCAPSL CTRC RMVL W/O ECP: CPT | Mod: RT

## 2020-09-24 ENCOUNTER — NON-APPOINTMENT (OUTPATIENT)
Age: 75
End: 2020-09-24

## 2020-09-24 ENCOUNTER — APPOINTMENT (OUTPATIENT)
Dept: OPHTHALMOLOGY | Facility: CLINIC | Age: 75
End: 2020-09-24
Payer: COMMERCIAL

## 2020-09-24 PROCEDURE — 99024 POSTOP FOLLOW-UP VISIT: CPT

## 2020-10-07 ENCOUNTER — NON-APPOINTMENT (OUTPATIENT)
Age: 75
End: 2020-10-07

## 2020-10-07 ENCOUNTER — APPOINTMENT (OUTPATIENT)
Dept: OPHTHALMOLOGY | Facility: CLINIC | Age: 75
End: 2020-10-07
Payer: COMMERCIAL

## 2020-10-07 PROCEDURE — 92136 OPHTHALMIC BIOMETRY: CPT

## 2020-10-07 PROCEDURE — 99024 POSTOP FOLLOW-UP VISIT: CPT

## 2020-11-10 ENCOUNTER — TRANSCRIPTION ENCOUNTER (OUTPATIENT)
Age: 75
End: 2020-11-10

## 2020-11-11 ENCOUNTER — OUTPATIENT (OUTPATIENT)
Dept: OUTPATIENT SERVICES | Facility: HOSPITAL | Age: 75
LOS: 1 days | Discharge: HOME | End: 2020-11-11
Payer: COMMERCIAL

## 2020-11-11 ENCOUNTER — NON-APPOINTMENT (OUTPATIENT)
Age: 75
End: 2020-11-11

## 2020-11-11 ENCOUNTER — APPOINTMENT (OUTPATIENT)
Dept: OPHTHALMOLOGY | Facility: AMBULATORY SURGERY CENTER | Age: 75
End: 2020-11-11

## 2020-11-11 DIAGNOSIS — Z98.890 OTHER SPECIFIED POSTPROCEDURAL STATES: Chronic | ICD-10-CM

## 2020-11-11 DIAGNOSIS — Z90.49 ACQUIRED ABSENCE OF OTHER SPECIFIED PARTS OF DIGESTIVE TRACT: Chronic | ICD-10-CM

## 2020-11-11 PROCEDURE — 66984 XCAPSL CTRC RMVL W/O ECP: CPT | Mod: LT,79

## 2020-11-12 ENCOUNTER — APPOINTMENT (OUTPATIENT)
Dept: OPHTHALMOLOGY | Facility: CLINIC | Age: 75
End: 2020-11-12
Payer: COMMERCIAL

## 2020-11-12 ENCOUNTER — NON-APPOINTMENT (OUTPATIENT)
Age: 75
End: 2020-11-12

## 2020-11-12 PROCEDURE — 99024 POSTOP FOLLOW-UP VISIT: CPT

## 2020-11-16 ENCOUNTER — NON-APPOINTMENT (OUTPATIENT)
Age: 75
End: 2020-11-16

## 2020-11-16 ENCOUNTER — APPOINTMENT (OUTPATIENT)
Dept: OPHTHALMOLOGY | Facility: CLINIC | Age: 75
End: 2020-11-16
Payer: COMMERCIAL

## 2020-11-16 PROCEDURE — 99024 POSTOP FOLLOW-UP VISIT: CPT

## 2020-12-07 ENCOUNTER — APPOINTMENT (OUTPATIENT)
Dept: OPHTHALMOLOGY | Facility: CLINIC | Age: 75
End: 2020-12-07
Payer: COMMERCIAL

## 2020-12-07 ENCOUNTER — NON-APPOINTMENT (OUTPATIENT)
Age: 75
End: 2020-12-07

## 2020-12-07 PROCEDURE — 99024 POSTOP FOLLOW-UP VISIT: CPT

## 2021-06-02 ENCOUNTER — NON-APPOINTMENT (OUTPATIENT)
Age: 76
End: 2021-06-02

## 2021-06-02 ENCOUNTER — APPOINTMENT (OUTPATIENT)
Dept: OPHTHALMOLOGY | Facility: CLINIC | Age: 76
End: 2021-06-02
Payer: COMMERCIAL

## 2021-06-02 PROCEDURE — 92012 INTRM OPH EXAM EST PATIENT: CPT

## 2021-06-22 ENCOUNTER — APPOINTMENT (OUTPATIENT)
Dept: OPHTHALMOLOGY | Facility: CLINIC | Age: 76
End: 2021-06-22
Payer: COMMERCIAL

## 2021-06-22 ENCOUNTER — NON-APPOINTMENT (OUTPATIENT)
Age: 76
End: 2021-06-22

## 2021-06-22 PROCEDURE — 92014 COMPRE OPH EXAM EST PT 1/>: CPT

## 2022-08-02 ENCOUNTER — NON-APPOINTMENT (OUTPATIENT)
Age: 77
End: 2022-08-02

## 2022-08-02 ENCOUNTER — APPOINTMENT (OUTPATIENT)
Dept: OPHTHALMOLOGY | Facility: CLINIC | Age: 77
End: 2022-08-02

## 2022-08-02 PROCEDURE — 92250 FUNDUS PHOTOGRAPHY W/I&R: CPT

## 2022-08-02 PROCEDURE — 92014 COMPRE OPH EXAM EST PT 1/>: CPT

## 2023-08-07 ENCOUNTER — APPOINTMENT (OUTPATIENT)
Dept: OPHTHALMOLOGY | Facility: CLINIC | Age: 78
End: 2023-08-07
Payer: COMMERCIAL

## 2023-08-07 ENCOUNTER — NON-APPOINTMENT (OUTPATIENT)
Age: 78
End: 2023-08-07

## 2023-08-07 PROCEDURE — 92014 COMPRE OPH EXAM EST PT 1/>: CPT

## 2023-08-25 ENCOUNTER — APPOINTMENT (OUTPATIENT)
Dept: OPHTHALMOLOGY | Facility: CLINIC | Age: 78
End: 2023-08-25

## 2023-08-28 ENCOUNTER — NON-APPOINTMENT (OUTPATIENT)
Age: 78
End: 2023-08-28

## 2023-08-28 ENCOUNTER — APPOINTMENT (OUTPATIENT)
Dept: OPHTHALMOLOGY | Facility: CLINIC | Age: 78
End: 2023-08-28
Payer: COMMERCIAL

## 2023-08-28 PROCEDURE — 92083 EXTENDED VISUAL FIELD XM: CPT

## 2023-09-18 NOTE — ASSESSMENT
[FreeTextEntry1] : # CKD stage 3 stable.\par * The patient has been counseled that chronic kidney disease is a significant condition and regular office followup with me (at least every 2-3 months for now) is essential for monitoring, and that it is their responsibility to make a follow up appointment. The patient has been counseled never to stop taking their medications without discussing it with me or another doctor. The patient has been counseled on risk of acute renal failure and instructed to immediately call and speak with me or go immediately to ER with any severe symptoms, nausea, vomiting, diarrhea, chest pain, or shortness of breath. The patient has been counseled on avoiding NSAIDs. Reducing or avoiding red meat and starting folate 800 mg qd has been advised. A counseling information sheet has been given. All their questions were answered.\par \par # Liver failure/cirrhosis.\par * Follow up with transplant center.\par \par # Labile BP controlled. \par * The patient's blood pressure was checked with the [employer] [id number] using the SPRINT trial protocol after sitting quietly in an empty room with back supported and feet on the floor for 5 minutes. The average of 3 readings were taken. \par * The patient was advised to check their BP at home with an automatic arm cuff, write down the readings, and reach me directly on the phone immediately if they are persistently > 160 systolic or if SBP is less than 90 or if lightheadedness develops. They were advised to bring in all blood pressure readings and medications next visit.\par * The patient has been counseled that they have a a significant medical condition and regular office followup (at least every 2-3 months for now) is essential for monitoring, and that it is their responsibility to make follow up appointments. The patient also understands that they must never stop or change their medications without discussing this with me (or another physician). A counseling information sheet has been given. All their questions were answered. \par \par # Fluid overload.\par * Continue aldactone.\par * Follow weight daily. \par \par # Nephrolithiasis and previous bladder stone.\par * Follow up with urology. \par \par # Acidosis.\par * Recheck bicarb level.\par \par  No

## 2023-10-31 ENCOUNTER — APPOINTMENT (OUTPATIENT)
Dept: NEPHROLOGY | Facility: CLINIC | Age: 78
End: 2023-10-31
Payer: COMMERCIAL

## 2023-10-31 VITALS — DIASTOLIC BLOOD PRESSURE: 82 MMHG | SYSTOLIC BLOOD PRESSURE: 100 MMHG | HEART RATE: 106 BPM

## 2023-10-31 VITALS — DIASTOLIC BLOOD PRESSURE: 78 MMHG | SYSTOLIC BLOOD PRESSURE: 101 MMHG | HEART RATE: 116 BPM

## 2023-10-31 VITALS — BODY MASS INDEX: 742.14 KG/M2 | WEIGHT: 152 LBS

## 2023-10-31 DIAGNOSIS — I95.9 HYPOTENSION, UNSPECIFIED: ICD-10-CM

## 2023-10-31 DIAGNOSIS — N20.0 CALCULUS OF KIDNEY: ICD-10-CM

## 2023-10-31 DIAGNOSIS — R35.89 OTHER POLYURIA: ICD-10-CM

## 2023-10-31 DIAGNOSIS — Z90.49 ACQUIRED ABSENCE OF OTHER SPECIFIED PARTS OF DIGESTIVE TRACT: ICD-10-CM

## 2023-10-31 DIAGNOSIS — K74.60 UNSPECIFIED CIRRHOSIS OF LIVER: ICD-10-CM

## 2023-10-31 DIAGNOSIS — D47.2 MONOCLONAL GAMMOPATHY: ICD-10-CM

## 2023-10-31 PROCEDURE — 36415 COLL VENOUS BLD VENIPUNCTURE: CPT

## 2023-10-31 PROCEDURE — 99204 OFFICE O/P NEW MOD 45 MIN: CPT | Mod: 25

## 2024-02-01 ENCOUNTER — APPOINTMENT (OUTPATIENT)
Dept: NEPHROLOGY | Facility: CLINIC | Age: 79
End: 2024-02-01
Payer: COMMERCIAL

## 2024-02-01 DIAGNOSIS — R68.89 OTHER GENERAL SYMPTOMS AND SIGNS: ICD-10-CM

## 2024-02-01 DIAGNOSIS — K50.90 CROHN'S DISEASE, UNSPECIFIED, W/OUT COMPLICATIONS: ICD-10-CM

## 2024-02-01 DIAGNOSIS — Z94.4 LIVER TRANSPLANT STATUS: ICD-10-CM

## 2024-02-01 PROCEDURE — 99214 OFFICE O/P EST MOD 30 MIN: CPT

## 2024-02-01 NOTE — ASSESSMENT
[FreeTextEntry1] : -- # Hypokalemia and hypomag. * High K diet. * Recheck labs within 1 week.  # CKD stage 4 with proteinuria in setting of liver transplant.  * Likely related to aging, nephrosclerosis, and sirolimus. * No evidence for myeloma or other causes. * Risk of renal biopsy outweighs benefits. * Recheck labs, including cystatin C, monoclonal protein evaluation, urinalysis, and urine albumin quantification. * Therapies for kidney disease: blood pressure control; other evidence-based therapies recommended including exercise, a plant-based lower oxalate diet, and 400 mcg folic acid daily * Cardiovascular disease prevention: counseling on healthy diet, physical activity, weight loss, alcohol limitation, blood pressure control * A counseling information sheet on CKD has been given (which they have been instructed to read). * The patient has been counseled that chronic kidney disease is a significant condition and regular office follow-up with me (at least every 2 weeks for now) is important for monitoring and their health, and that it is their responsibility to make a follow-up appointment. * The patient has been counseled never to stop taking their medications without discussing it with me or another doctor. * The patient has been counseled on avoiding NSAIDs. * The patient has been counseled on risk of worsening kidney function and instructed to immediately call and speak with me and go immediately to ER with any severe symptoms, nausea, vomiting, diarrhea, chest pain, or shortness of breath.  # Kidney stone. * Maintain high fluid intake.

## 2024-02-01 NOTE — HISTORY OF PRESENT ILLNESS
[Home] : at home, [unfilled] , at the time of the visit. [Medical Office: ([facility])___] : at the medical office located in  [FreeTextEntry1] : * 1/25/24 labs. K 3.0. Supplemented with k-dur and now on a high K diet. Cr 2.4. Mg 1.1. Now on mg supplement. * He has chronic diarrhea and short gut syndrome. * Following up [place] [facility].   Previous history (31Oct23): * Not seen for > 3 years. In the intermim he received a hep C liver transplant in 2019. He had previously seen Dr. Huerta and would like a second opinion. He also is scheduled to see another nephrologist at [facility]. * 4 - 5 years ago his creatinine was 1.8. Most recently his creatinine has increased to approx 2.3 and he has multiple episode of labile BP and dehydration (N/V/D) with LILIAN, with creatinine up to 4, by report. Previously on mycophenolate and tacrolimus but this was changed to sirolimus.   CT: R multiple stones measuring up to 1.4. 3mm stone. L: 2mm stone.   * He has chronic diarrhea and short gut syndrome. He urinates only 1.7 liters daily. Urine is occasionally dark.   * BP controlled.  - 110 at home. No lightheadedness. No CP/SOB. Compliant with medications. *   He was evaluated for amyloid/myeloma with bone marrow and heme eval which was negative.   Previous history: - Started on 6MP for Crohn's Disease in 1978, azulfudine from 1978 - 2003, and treated with remicade in 2004. Reportedly kidney disease was not temporally related to these medications. - CKD stage 3 present since at least 2015. His creatinine was 1.36 in 8/13/15 and increased to 1.48 in 1/26/16. At that time, he may have been "slightly dehydrated" due to reflux symptoms, which improved with zantac. He had previously been treated with protonix for 2 weeks only. 3 weeks ago he saw Dr. Tyler Yi for urinary tract infection attributed to bladder diverticulum which possible urinary retention. (Notes to be obtained.) He was treated with flomax and cipro. His symptoms have now resolved. Labs by Dr. Robles on 2/24/16 revealed an improved creatinine of 1.2. No other recent medication changes. CT scan of abdomen is pending (and will be forwarded to me). He has not been told previously of obstructing nephrolithasis. Last passed nephrolithasis 6 months ago, reportedly calcium oxalate. He will increasedd fluid intake to 72 ounces daily. - He sees Dr. Robles for newly diagnosed monoclonal gammopathy but has not been told he has myeloma or any other disorder. A bone marrow biopsy is reportedly not planned (Labs to be obtained.)  Labs from 7/23: 2.4 gram proteinuria. Cr 2.37. (Usual range 2.1 - 2.9.) (He previously had 400 mg albuminuria in 2016.)   On inhalation therapy for MAC.   Meds: Acritretin  Buproprion  Flomax Finasteride Ursodiol Allopurinol 300 Sodium bicarb Spiriva Advair Sirolmus 2 mg once daily  Previous history (24Jan19): -  Encephalopathy controlled. - Hypotension improved, SBP 90 - 100s at home. NO LIGHTHEADEDNESS.  - Bicarbonate 18. * CKD stable, creatinine 1.8.   Spirinolactone: 25 mg altering with 50 mg Sodium bicarbonate: 1 teaspoon daily  Hepatologist: Genevieve Schafer MD Fax [phone] GI: Dr. Crystal Urologist: Dr. Tyler Yi [phone] Hematologist: Dr. Seng Robles  Cardiologist: Dr. Casi Montejo [place] Nephrologist: Dr. Huerta

## 2024-02-07 ENCOUNTER — LABORATORY RESULT (OUTPATIENT)
Age: 79
End: 2024-02-07

## 2024-02-12 ENCOUNTER — TRANSCRIPTION ENCOUNTER (OUTPATIENT)
Age: 79
End: 2024-02-12

## 2024-02-19 LAB
25(OH)D3 SERPL-MCNC: 28.8 NG/ML
ALBUMIN SERPL ELPH-MCNC: 3.7 G/DL
ALP BLD-CCNC: 111 U/L
ALT SERPL-CCNC: 16 U/L
ANION GAP SERPL CALC-SCNC: 14 MMOL/L
APO B SERPL-MCNC: 65 MG/DL
APPEARANCE: CLEAR
AST SERPL-CCNC: 19 U/L
BASOPHILS # BLD AUTO: 0.02 K/UL
BASOPHILS NFR BLD AUTO: 0.4 %
BILIRUB SERPL-MCNC: 0.3 MG/DL
BILIRUBIN URINE: NEGATIVE
BLOOD URINE: ABNORMAL
BUN SERPL-MCNC: 31 MG/DL
CALCIUM SERPL-MCNC: 9.1 MG/DL
CALCIUM SERPL-MCNC: 9.1 MG/DL
CHLORIDE SERPL-SCNC: 102 MMOL/L
CHOLEST SERPL-MCNC: 143 MG/DL
CO2 SERPL-SCNC: 27 MMOL/L
COLOR: YELLOW
CREAT SERPL-MCNC: 2.59 MG/DL
CREAT SPEC-SCNC: 92 MG/DL
CREAT SPEC-SCNC: 92 MG/DL
CREAT/PROT UR: 7.1 RATIO
CYSTATIN C SERPL-MCNC: 2.72 MG/L
EGFR: 25 ML/MIN/1.73M2
EOSINOPHIL # BLD AUTO: 0.06 K/UL
EOSINOPHIL NFR BLD AUTO: 1.3 %
ESTIMATED AVERAGE GLUCOSE: 128 MG/DL
FERRITIN SERPL-MCNC: 168 NG/ML
GFR/BSA.PRED SERPLBLD CYS-BASED-ARV: 19 ML/MIN/1.73M2
GLUCOSE QUALITATIVE U: 250 MG/DL
GLUCOSE SERPL-MCNC: 178 MG/DL
HBA1C MFR BLD HPLC: 6.1 %
HCT VFR BLD CALC: 35.5 %
HDLC SERPL-MCNC: 55 MG/DL
HGB BLD-MCNC: 11.8 G/DL
IMM GRANULOCYTES NFR BLD AUTO: 0.2 %
KETONES URINE: NEGATIVE MG/DL
LDLC SERPL CALC-MCNC: 42 MG/DL
LEUKOCYTE ESTERASE URINE: NEGATIVE
LYMPHOCYTES # BLD AUTO: 1.55 K/UL
LYMPHOCYTES NFR BLD AUTO: 32.7 %
MAGNESIUM SERPL-MCNC: 1.4 MG/DL
MAN DIFF?: NORMAL
MCHC RBC-ENTMCNC: 33.1 PG
MCHC RBC-ENTMCNC: 33.2 GM/DL
MCV RBC AUTO: 99.4 FL
MICROALBUMIN 24H UR DL<=1MG/L-MCNC: 406.3 MG/DL
MICROALBUMIN/CREAT 24H UR-RTO: 4416 MG/G
MONOCYTES # BLD AUTO: 0.35 K/UL
MONOCYTES NFR BLD AUTO: 7.4 %
NEUTROPHILS # BLD AUTO: 2.75 K/UL
NEUTROPHILS NFR BLD AUTO: 58 %
NITRITE URINE: NEGATIVE
NONHDLC SERPL-MCNC: 87 MG/DL
PARATHYROID HORMONE INTACT: 152 PG/ML
PH URINE: 6.5
PHOSPHATE SERPL-MCNC: 3.2 MG/DL
PLATELET # BLD AUTO: 119 K/UL
POTASSIUM SERPL-SCNC: 3.1 MMOL/L
PROT SERPL-MCNC: 6.4 G/DL
PROT UR-MCNC: 651 MG/DL
PROTEIN URINE: 300 MG/DL
RBC # BLD: 3.57 M/UL
RBC # FLD: 16.7 %
SODIUM SERPL-SCNC: 142 MMOL/L
SPECIFIC GRAVITY URINE: 1.02
TRIGL SERPL-MCNC: 306 MG/DL
TSH SERPL-ACNC: 3.77 UIU/ML
UROBILINOGEN URINE: 0.2 MG/DL
VIT B12 SERPL-MCNC: >2000 PG/ML
WBC # FLD AUTO: 4.74 K/UL

## 2024-02-19 RX ORDER — POTASSIUM CHLORIDE 1500 MG/1
20 TABLET, EXTENDED RELEASE ORAL
Qty: 90 | Refills: 3 | Status: ACTIVE | COMMUNITY
Start: 2024-02-19 | End: 1900-01-01

## 2024-02-21 RX ORDER — SPIRONOLACTONE 25 MG/1
25 TABLET ORAL
Qty: 90 | Refills: 3 | Status: DISCONTINUED | COMMUNITY
Start: 1900-01-01 | End: 2024-02-21

## 2024-02-22 ENCOUNTER — TRANSCRIPTION ENCOUNTER (OUTPATIENT)
Age: 79
End: 2024-02-22

## 2024-02-23 ENCOUNTER — APPOINTMENT (OUTPATIENT)
Dept: NEPHROLOGY | Facility: CLINIC | Age: 79
End: 2024-02-23

## 2024-02-23 ENCOUNTER — APPOINTMENT (OUTPATIENT)
Dept: NEPHROLOGY | Facility: CLINIC | Age: 79
End: 2024-02-23
Payer: COMMERCIAL

## 2024-02-23 DIAGNOSIS — E87.70 FLUID OVERLOAD, UNSPECIFIED: ICD-10-CM

## 2024-02-23 DIAGNOSIS — N18.4 CHRONIC KIDNEY DISEASE, STAGE 4 (SEVERE): ICD-10-CM

## 2024-02-23 DIAGNOSIS — Z79.899 OTHER LONG TERM (CURRENT) DRUG THERAPY: ICD-10-CM

## 2024-02-23 DIAGNOSIS — N18.9 CHRONIC KIDNEY DISEASE, UNSPECIFIED: ICD-10-CM

## 2024-02-23 DIAGNOSIS — N20.0 CALCULUS OF KIDNEY: ICD-10-CM

## 2024-02-23 DIAGNOSIS — E87.6 HYPOKALEMIA: ICD-10-CM

## 2024-02-23 PROCEDURE — 99214 OFFICE O/P EST MOD 30 MIN: CPT

## 2024-02-23 NOTE — HISTORY OF PRESENT ILLNESS
[Medical Office: ([facility])___] : at the medical office located in  [Home] : at home, [unfilled] , at the time of the visit. [Verbal consent obtained from patient] : the patient, [unfilled] [FreeTextEntry1] : * Today felt fatigued. Increased diarrhea as he has reduced creon, which he has now increased. Labs being checked today by other physicians. He started K supplement.  Receiving intravenous hydration. * Following up with liver team at [facility]. * CKD stable, creatinine 2.59. * BP labile.  - 130s at home. No lightheadedness. No CP/SOB. Compliant with medications. *   Previous history (01Feb24): * 1/25/24 labs. K 3.0. Supplemented with k-dur and now on a high K diet. Cr 2.4. Mg 1.1. Now on mg supplement. * He has chronic diarrhea and short gut syndrome. * Following up with liver team at [facility].   Previous history (31Oct23): * Not seen for > 3 years. In the interm he received a hep C liver transplant in 2019. He had previously seen Dr. Huerta and would like a second opinion. He also is scheduled to see another nephrologist at [facility]. * 4 - 5 years ago his creatinine was 1.8. Most recently his creatinine has increased to approx 2.3 and he has multiple episode of labile BP and dehydration (N/V/D) with LILIAN, with creatinine up to 4, by report. Previously on mycophenolate and tacrolimus but this was changed to sirolimus.   CT: R multiple stones measuring up to 1.4. 3mm stone. L: 2mm stone.   * He has chronic diarrhea and short gut syndrome. He urinates only 1.7 liters daily. Urine is occasionally dark.   * BP controlled.  - 110 at home. No lightheadedness. No CP/SOB. Compliant with medications. *   He was evaluated for amyloid/myeloma with bone marrow and heme eval which was negative.   Previous history: - Started on 6MP for Crohn's Disease in 1978, azulfudine from 1978 - 2003, and treated with remicade in 2004. Reportedly kidney disease was not temporally related to these medications. - CKD stage 3 present since at least 2015. His creatinine was 1.36 in 8/13/15 and increased to 1.48 in 1/26/16. At that time, he may have been "slightly dehydrated" due to reflux symptoms, which improved with zantac. He had previously been treated with protonix for 2 weeks only. 3 weeks ago he saw Dr. Tyler Yi for urinary tract infection attributed to bladder diverticulum which possible urinary retention. (Notes to be obtained.) He was treated with flomax and cipro. His symptoms have now resolved. Labs by Dr. Robles on 2/24/16 revealed an improved creatinine of 1.2. No other recent medication changes. CT scan of abdomen is pending (and will be forwarded to me). He has not been told previously of obstructing nephrolithasis. Last passed nephrolithasis 6 months ago, reportedly calcium oxalate. He will increasedd fluid intake to 72 ounces daily. - He sees Dr. Robles for newly diagnosed monoclonal gammopathy but has not been told he has myeloma or any other disorder. A bone marrow biopsy is reportedly not planned (Labs to be obtained.)  Labs from 7/23: 2.4 gram proteinuria. Cr 2.37. (Usual range 2.1 - 2.9.) (He previously had 400 mg albuminuria in 2016.)   On inhalation therapy for MAC.   Meds: Acritretin  Buproprion  Flomax Finasteride Ursodiol Allopurinol 300 Sodium bicarb Spiriva Advair Sirolmus 2 mg once daily  Previous history (24Jan19): -  Encephalopathy controlled. - Hypotension improved, SBP 90 - 100s at home. NO LIGHTHEADEDNESS.  - Bicarbonate 18. * CKD stable, creatinine 1.8.   Spirinolactone: 25 mg altering with 50 mg Sodium bicarbonate: 1 teaspoon daily  Hepatologist: Genevieve Schafer MD Fax [phone] GI: Dr. Crystal Urologist: Dr. Tyler Yi [phone] Hematologist: Dr. Seng Robles  Cardiologist: Dr. Casi Montejo [place] Nephrologist: Dr. Huerta

## 2024-02-23 NOTE — ASSESSMENT
[FreeTextEntry1] : -- # Hypokalemia and hypomag. * High K diet. * Cont k-dur. * Labs being checked today.   # CKD stage 4 with proteinuria in setting of liver transplant. * Likely related to aging, nephrosclerosis, and sirolimus. * No evidence for myeloma or other causes. * Risk of renal biopsy outweighs benefits. * Recheck labs next visit, including cystatin C, monoclonal protein evaluation, urinalysis, and urine albumin quantification. * Therapies for kidney disease: blood pressure control; other evidence-based therapies recommended including exercise, a plant-based lower oxalate diet, and 400 mcg folic acid daily * Cardiovascular disease prevention: counseling on healthy diet, physical activity, weight loss, alcohol limitation, blood pressure control * A counseling information sheet on CKD has been given (which they have been instructed to read). * The patient has been counseled that chronic kidney disease is a significant condition and regular office follow-up with me (at least every 2 weeks for now) is important for monitoring and their health, and that it is their responsibility to make a follow-up appointment. * The patient has been counseled never to stop taking their medications without discussing it with me or another doctor. * The patient has been counseled on avoiding NSAIDs. * The patient has been counseled on risk of worsening kidney function and instructed to immediately call and speak with me and go immediately to ER with any severe symptoms, nausea, vomiting, diarrhea, chest pain, or shortness of breath.  # Kidney stone. * Maintain high fluid intake.

## 2024-03-05 ENCOUNTER — APPOINTMENT (OUTPATIENT)
Dept: OPHTHALMOLOGY | Facility: CLINIC | Age: 79
End: 2024-03-05

## 2024-08-12 ENCOUNTER — APPOINTMENT (OUTPATIENT)
Dept: OPHTHALMOLOGY | Facility: CLINIC | Age: 79
End: 2024-08-12

## 2024-12-02 VITALS
OXYGEN SATURATION: 98 % | SYSTOLIC BLOOD PRESSURE: 200 MMHG | TEMPERATURE: 98 F | HEART RATE: 118 BPM | RESPIRATION RATE: 24 BRPM | DIASTOLIC BLOOD PRESSURE: 127 MMHG | WEIGHT: 149.69 LBS

## 2024-12-02 PROCEDURE — 99291 CRITICAL CARE FIRST HOUR: CPT

## 2024-12-02 NOTE — ED ADULT TRIAGE NOTE - CHIEF COMPLAINT QUOTE
fall found on floor 30 min ago by wife. report AMS by EMS. multiple abrasion noted at face, extremities. Denies on blood thinner  Stroke code called 1148PM. MD Dwyer, stroke team on bedside.

## 2024-12-02 NOTE — ED ADULT TRIAGE NOTE - WEIGHT METHOD
H&P Note    Patient Identification:  Name: Beena Vincent  Age: 31 y.o.  Sex: female  :  1991  MRN: [id number]                       Chief Complaint: Gestational diabetes    History of Present Illness:   31-year-old  3 para 2 presents at 39-1/7 weeks for an induction of labor due to gestational diabetes, type A2.  Group B strep status is negative.  Fetal heart tones are reactive.    Problem List:  @[place]@  Past Medical History:  Past Medical History:   Diagnosis Date   • Asthma     inhaler as needed   • Cervical dysplasia    • Depression    • Desmoid tumor     back of right knee- pt desires to proceed with treatments after delivery   • Gestational diabetes    • Gestational hypertension    • Iron deficiency anemia    • Preeclampsia      Past Surgical History:  Past Surgical History:   Procedure Laterality Date   • APPENDECTOMY N/A 10/1/2021    Procedure: APPENDECTOMY LAPAROSCOPIC;  Surgeon: Ahmet Dong Jr., MD;  Location: [facility];  Service: General;  Laterality: N/A;   • KNEE ARTHROSCOPY      AGE 5   • LEEP N/A 2019    Procedure: LOOP ELECTROCAUTERY EXCISION PROCEDURE;  Surgeon: Arsh Ray MD;  Location: [facility];  Service: Obstetrics/Gynecology   • SOFT TISSUE BIOPSY Right 3/23/2022    Procedure: BIOPSY SOFT TISSUE THIGH / KNEE;  Surgeon: Chris Randall MD;  Location: [facility];  Service: Orthopedics;  Laterality: Right;   • WISDOM TOOTH EXTRACTION        Home Meds:  Medications Prior to Admission   Medication Sig Dispense Refill Last Dose   • Blood Glucose Monitoring Suppl (FreeStyle Lite) w/Device kit USE 1 SEVEN TIMES DAILY   3/8/2023   • ferrous sulfate 325 (65 FE) MG tablet Take 1 tablet by mouth 2 (Two) Times a Day With Meals. 60 tablet 5 3/8/2023   • glucose blood test strip Use as instructed 7xs daily for blood sugar 200 each 5 3/8/2023   • glucose monitor monitoring kit To check blood sugars 7x daily 1 each 0 3/8/2023   • Lancets (freestyle)  "lancets Pt to check blood sugar 7xs daily 15 each 12 3/8/2023   • metFORMIN ER (GLUCOPHAGE-XR) 500 MG 24 hr tablet Take 1 tablet by mouth 2 (Two) Times a Day With Meals. 60 tablet 2 3/8/2023   • Prenatal Vit-Fe Fumarate-FA (Prenatabs FA) 29-1 MG tablet Take 1 tablet by mouth Daily. 30 tablet 11 3/8/2023   • albuterol sulfate  (90 Base) MCG/ACT inhaler Inhale 2 puffs Every 4 (Four) Hours As Needed for Wheezing. 18 g 0 More than a month   • ondansetron (ZOFRAN) 4 MG tablet Take 1 tablet by mouth Every 8 (Eight) Hours As Needed for Nausea or Vomiting. 30 tablet 3 More than a month     Current Meds:   [unfilled]  Allergies:  Allergies   Allergen Reactions   • Penicillins Nausea And Vomiting   • Adhesive Tape Rash     \"SURGICAL TAPE\"  AS A CHILD     Immunizations:  Immunization History   Administered Date(s) Administered   • FLUAD TRI 65YR+ 12/08/2011   • Flu Vaccine Intradermal Quad 18-64YR 12/08/2011   • FluLaval/Fluzone >6mos 11/10/2022   • MMR 08/14/2020   • Rho (D) Immune Globulin 05/26/2020, 12/22/2022   • Tdap 08/14/2020   • flucelvax quad pfs =>4 YRS 01/14/2020     Social History:   Social History     Tobacco Use   • Smoking status: Former     Packs/day: 0.50     Years: 4.00     Pack years: 2.00     Types: Cigarettes     Quit date: 2/8/2020     Years since quitting: 3.0   • Smokeless tobacco: Never   Substance Use Topics   • Alcohol use: Not Currently     Comment: socially      Family History:  Family History   Problem Relation Age of Onset   • Diabetes Father    • Arthritis Maternal Grandmother    • Lung cancer Maternal Grandfather    • Heart attack Maternal Grandfather    • Heart disease Maternal Grandfather    • Stroke Maternal Grandfather    • Hyperlipidemia Maternal Grandfather    • Malig Hyperthermia Neg Hx    • Breast cancer Neg Hx    • Ovarian cancer Neg Hx    • Uterine cancer Neg Hx    • Colon cancer Neg Hx         Review of Systems  A comprehensive review of systems was negative.    Objective:  tMax " 24 hrs: Temp (24hrs), Av.2 °F (36.8 °C), Min:98.2 °F (36.8 °C), Max:98.2 °F (36.8 °C)    Vitals Ranges:   Temp:  [98.2 °F (36.8 °C)] 98.2 °F (36.8 °C)  Resp:  [16] 16  Intake and Output Last 3 Shifts:   No intake/output data recorded.    Exam:     General Appearance:    Alert, cooperative, no distress, appears stated age   Head:    Normocephalic, without obvious abnormality, atraumatic   Back:     Symmetric, no curvature, ROM normal, no CVA tenderness   Lungs:     Clear to auscultation bilaterally, respirations unlabored   Chest Wall:    No tenderness or deformity    Heart:    Regular rate and rhythm, S1 and S2 normal, no murmur, rub   or gallop       Abdomen:    Soft, gravid.  There is no epigastric tenderness.  No fundal tenderness.  No suprapubic tenderness.   Genitalia:   60% effaced and 1.5 cm dilated at admission   Rectal:   Not performed today.   Extremities:  Equal in size   Skin:   Skin color, texture, turgor normal, no rashes or lesions   Lymph nodes:   Cervical, supraclavicular, and axillary nodes normal       Data Review:  Nonstress test is reactive  Lab Results (last 24 hours)     Procedure Component Value Units Date/Time    CBC (No Diff) [[id number]]  (Normal) Collected: 23    Specimen: Blood from Arm, Right Updated: 23     WBC 9.02 10*3/mm3      RBC 4.11 10*6/mm3      Hemoglobin 12.0 g/dL      Hematocrit 34.0 %      MCV 82.7 fL      MCH 29.2 pg      MCHC 35.3 g/dL      RDW 12.5 %      RDW-SD 37.3 fl      MPV 10.6 fL      Platelets 219 10*3/mm3         Assessment:    Gestational diabetes      1.  Intrauterine pregnancy at 39-1/7 weeks  2.  Gestational diabetes type A2    Plan:  Induction of labor.    Arsh Ray MD  3/8/2023   actual

## 2024-12-03 ENCOUNTER — INPATIENT (INPATIENT)
Facility: HOSPITAL | Age: 79
LOS: 9 days | Discharge: NURSING, REHAB OR LONG-TERM CARE | DRG: 643 | End: 2024-12-13
Attending: STUDENT IN AN ORGANIZED HEALTH CARE EDUCATION/TRAINING PROGRAM | Admitting: INTERNAL MEDICINE
Payer: COMMERCIAL

## 2024-12-03 ENCOUNTER — RESULT REVIEW (OUTPATIENT)
Age: 79
End: 2024-12-03

## 2024-12-03 DIAGNOSIS — Z90.49 ACQUIRED ABSENCE OF OTHER SPECIFIED PARTS OF DIGESTIVE TRACT: Chronic | ICD-10-CM

## 2024-12-03 DIAGNOSIS — Z98.890 OTHER SPECIFIED POSTPROCEDURAL STATES: Chronic | ICD-10-CM

## 2024-12-03 LAB
ADD ON TEST-SPECIMEN IN LAB: SIGNIFICANT CHANGE UP
ALBUMIN SERPL ELPH-MCNC: 3.1 G/DL — LOW (ref 3.3–5)
ALBUMIN SERPL ELPH-MCNC: 3.1 G/DL — LOW (ref 3.3–5)
ALBUMIN SERPL ELPH-MCNC: 3.2 G/DL — LOW (ref 3.3–5)
ALP SERPL-CCNC: 139 U/L — HIGH (ref 40–120)
ALP SERPL-CCNC: 142 U/L — HIGH (ref 40–120)
ALP SERPL-CCNC: 148 U/L — HIGH (ref 40–120)
ALT FLD-CCNC: 12 U/L — SIGNIFICANT CHANGE UP (ref 10–45)
ALT FLD-CCNC: 12 U/L — SIGNIFICANT CHANGE UP (ref 10–45)
ALT FLD-CCNC: 13 U/L — SIGNIFICANT CHANGE UP (ref 10–45)
ANION GAP SERPL CALC-SCNC: 12 MMOL/L — SIGNIFICANT CHANGE UP (ref 5–17)
ANION GAP SERPL CALC-SCNC: 12 MMOL/L — SIGNIFICANT CHANGE UP (ref 5–17)
ANION GAP SERPL CALC-SCNC: 13 MMOL/L — SIGNIFICANT CHANGE UP (ref 5–17)
ANION GAP SERPL CALC-SCNC: 13 MMOL/L — SIGNIFICANT CHANGE UP (ref 5–17)
ANION GAP SERPL CALC-SCNC: 14 MMOL/L — SIGNIFICANT CHANGE UP (ref 5–17)
APPEARANCE UR: CLEAR — SIGNIFICANT CHANGE UP
APTT BLD: 33.5 SEC — SIGNIFICANT CHANGE UP (ref 24.5–35.6)
AST SERPL-CCNC: 22 U/L — SIGNIFICANT CHANGE UP (ref 10–40)
AST SERPL-CCNC: 23 U/L — SIGNIFICANT CHANGE UP (ref 10–40)
AST SERPL-CCNC: 28 U/L — SIGNIFICANT CHANGE UP (ref 10–40)
BASE EXCESS BLDV CALC-SCNC: -0.5 MMOL/L — SIGNIFICANT CHANGE UP (ref -2–3)
BASE EXCESS BLDV CALC-SCNC: 1.1 MMOL/L — SIGNIFICANT CHANGE UP (ref -2–3)
BASOPHILS # BLD AUTO: 0.03 K/UL — SIGNIFICANT CHANGE UP (ref 0–0.2)
BASOPHILS NFR BLD AUTO: 0.2 % — SIGNIFICANT CHANGE UP (ref 0–2)
BILIRUB SERPL-MCNC: 0.4 MG/DL — SIGNIFICANT CHANGE UP (ref 0.2–1.2)
BILIRUB SERPL-MCNC: 0.6 MG/DL — SIGNIFICANT CHANGE UP (ref 0.2–1.2)
BILIRUB SERPL-MCNC: 0.6 MG/DL — SIGNIFICANT CHANGE UP (ref 0.2–1.2)
BILIRUB UR-MCNC: NEGATIVE — SIGNIFICANT CHANGE UP
BLD GP AB SCN SERPL QL: NEGATIVE — SIGNIFICANT CHANGE UP
BUN SERPL-MCNC: 24 MG/DL — HIGH (ref 7–23)
BUN SERPL-MCNC: 25 MG/DL — HIGH (ref 7–23)
BUN SERPL-MCNC: 25 MG/DL — HIGH (ref 7–23)
CALCIUM SERPL-MCNC: 8.4 MG/DL — SIGNIFICANT CHANGE UP (ref 8.4–10.5)
CALCIUM SERPL-MCNC: 8.6 MG/DL — SIGNIFICANT CHANGE UP (ref 8.4–10.5)
CALCIUM SERPL-MCNC: 8.6 MG/DL — SIGNIFICANT CHANGE UP (ref 8.4–10.5)
CALCIUM SERPL-MCNC: 8.9 MG/DL — SIGNIFICANT CHANGE UP (ref 8.4–10.5)
CALCIUM SERPL-MCNC: 9 MG/DL — SIGNIFICANT CHANGE UP (ref 8.4–10.5)
CHLORIDE SERPL-SCNC: 81 MMOL/L — LOW (ref 96–108)
CHLORIDE SERPL-SCNC: 81 MMOL/L — LOW (ref 96–108)
CHLORIDE SERPL-SCNC: 83 MMOL/L — LOW (ref 96–108)
CHLORIDE SERPL-SCNC: 85 MMOL/L — LOW (ref 96–108)
CHLORIDE SERPL-SCNC: 85 MMOL/L — LOW (ref 96–108)
CK SERPL-CCNC: 256 U/L — HIGH (ref 30–200)
CO2 BLDV-SCNC: 26 MMOL/L — SIGNIFICANT CHANGE UP (ref 22–26)
CO2 BLDV-SCNC: 28 MMOL/L — HIGH (ref 22–26)
CO2 SERPL-SCNC: 23 MMOL/L — SIGNIFICANT CHANGE UP (ref 22–31)
CO2 SERPL-SCNC: 25 MMOL/L — SIGNIFICANT CHANGE UP (ref 22–31)
CO2 SERPL-SCNC: 25 MMOL/L — SIGNIFICANT CHANGE UP (ref 22–31)
COLOR SPEC: YELLOW — SIGNIFICANT CHANGE UP
CREAT ?TM UR-MCNC: 26 MG/DL — SIGNIFICANT CHANGE UP
CREAT SERPL-MCNC: 2.04 MG/DL — HIGH (ref 0.5–1.3)
CREAT SERPL-MCNC: 2.11 MG/DL — HIGH (ref 0.5–1.3)
CREAT SERPL-MCNC: 2.17 MG/DL — HIGH (ref 0.5–1.3)
CREAT SERPL-MCNC: 2.19 MG/DL — HIGH (ref 0.5–1.3)
CREAT SERPL-MCNC: 2.31 MG/DL — HIGH (ref 0.5–1.3)
DIFF PNL FLD: ABNORMAL
EGFR: 28 ML/MIN/1.73M2 — LOW
EGFR: 30 ML/MIN/1.73M2 — LOW
EGFR: 30 ML/MIN/1.73M2 — LOW
EGFR: 31 ML/MIN/1.73M2 — LOW
EGFR: 33 ML/MIN/1.73M2 — LOW
EOSINOPHIL # BLD AUTO: 0.03 K/UL — SIGNIFICANT CHANGE UP (ref 0–0.5)
EOSINOPHIL NFR BLD AUTO: 0.2 % — SIGNIFICANT CHANGE UP (ref 0–6)
ETHANOL SERPL-MCNC: <10 MG/DL — SIGNIFICANT CHANGE UP (ref 0–10)
GAS PNL BLDV: SIGNIFICANT CHANGE UP
GAS PNL BLDV: SIGNIFICANT CHANGE UP
GLUCOSE SERPL-MCNC: 112 MG/DL — HIGH (ref 70–99)
GLUCOSE SERPL-MCNC: 122 MG/DL — HIGH (ref 70–99)
GLUCOSE SERPL-MCNC: 125 MG/DL — HIGH (ref 70–99)
GLUCOSE SERPL-MCNC: 168 MG/DL — HIGH (ref 70–99)
GLUCOSE SERPL-MCNC: 175 MG/DL — HIGH (ref 70–99)
GLUCOSE UR QL: 100 MG/DL
GLUCOSE UR QL: NEGATIVE MG/DL — SIGNIFICANT CHANGE UP
GLUCOSE UR QL: NEGATIVE MG/DL — SIGNIFICANT CHANGE UP
HAV IGM SER-ACNC: SIGNIFICANT CHANGE UP
HBV CORE AB SER-ACNC: SIGNIFICANT CHANGE UP
HBV CORE IGM SER-ACNC: SIGNIFICANT CHANGE UP
HBV SURFACE AB SER-ACNC: ABNORMAL
HBV SURFACE AG SER-ACNC: SIGNIFICANT CHANGE UP
HCO3 BLDV-SCNC: 25 MMOL/L — SIGNIFICANT CHANGE UP (ref 22–29)
HCO3 BLDV-SCNC: 26 MMOL/L — SIGNIFICANT CHANGE UP (ref 22–29)
HCOV PNL SPEC NAA+PROBE: DETECTED
HCT VFR BLD CALC: 31.6 % — LOW (ref 39–50)
HCT VFR BLD CALC: 35.6 % — LOW (ref 39–50)
HCV AB S/CO SERPL IA: 0.07 S/CO — SIGNIFICANT CHANGE UP
HCV AB SERPL-IMP: SIGNIFICANT CHANGE UP
HGB BLD-MCNC: 10.6 G/DL — LOW (ref 13–17)
HGB BLD-MCNC: 11.8 G/DL — LOW (ref 13–17)
HIV 1+2 AB+HIV1 P24 AG SERPL QL IA: SIGNIFICANT CHANGE UP
IMM GRANULOCYTES NFR BLD AUTO: 0.6 % — SIGNIFICANT CHANGE UP (ref 0–0.9)
INR BLD: 1.14 — SIGNIFICANT CHANGE UP (ref 0.85–1.16)
KETONES UR-MCNC: NEGATIVE MG/DL — SIGNIFICANT CHANGE UP
LACTATE SERPL-SCNC: 0.7 MMOL/L — SIGNIFICANT CHANGE UP (ref 0.5–2)
LACTATE SERPL-SCNC: 2.1 MMOL/L — HIGH (ref 0.5–2)
LEUKOCYTE ESTERASE UR-ACNC: NEGATIVE — SIGNIFICANT CHANGE UP
LYMPHOCYTES # BLD AUTO: 24.7 % — SIGNIFICANT CHANGE UP (ref 13–44)
LYMPHOCYTES # BLD AUTO: 3.11 K/UL — SIGNIFICANT CHANGE UP (ref 1–3.3)
MAGNESIUM SERPL-MCNC: 2.6 MG/DL — SIGNIFICANT CHANGE UP (ref 1.6–2.6)
MCHC RBC-ENTMCNC: 30.5 PG — SIGNIFICANT CHANGE UP (ref 27–34)
MCHC RBC-ENTMCNC: 30.5 PG — SIGNIFICANT CHANGE UP (ref 27–34)
MCHC RBC-ENTMCNC: 33.1 G/DL — SIGNIFICANT CHANGE UP (ref 32–36)
MCHC RBC-ENTMCNC: 33.5 G/DL — SIGNIFICANT CHANGE UP (ref 32–36)
MCV RBC AUTO: 91.1 FL — SIGNIFICANT CHANGE UP (ref 80–100)
MCV RBC AUTO: 92 FL — SIGNIFICANT CHANGE UP (ref 80–100)
MONOCYTES # BLD AUTO: 0.69 K/UL — SIGNIFICANT CHANGE UP (ref 0–0.9)
MONOCYTES NFR BLD AUTO: 5.5 % — SIGNIFICANT CHANGE UP (ref 2–14)
NEUTROPHILS # BLD AUTO: 8.64 K/UL — HIGH (ref 1.8–7.4)
NEUTROPHILS NFR BLD AUTO: 68.8 % — SIGNIFICANT CHANGE UP (ref 43–77)
NITRITE UR-MCNC: NEGATIVE — SIGNIFICANT CHANGE UP
NRBC # BLD: 0 /100 WBCS — SIGNIFICANT CHANGE UP (ref 0–0)
NRBC # BLD: 0 /100 WBCS — SIGNIFICANT CHANGE UP (ref 0–0)
NT-PROBNP SERPL-SCNC: HIGH PG/ML (ref 0–300)
OSMOLALITY UR: 247 MOSM/KG — LOW (ref 300–900)
PCO2 BLDV: 37 MMHG — LOW (ref 42–55)
PCO2 BLDV: 51 MMHG — SIGNIFICANT CHANGE UP (ref 42–55)
PH BLDV: 7.32 — SIGNIFICANT CHANGE UP (ref 7.32–7.43)
PH BLDV: 7.44 — HIGH (ref 7.32–7.43)
PH UR: 6.5 — SIGNIFICANT CHANGE UP (ref 5–8)
PHOSPHATE SERPL-MCNC: 3.9 MG/DL — SIGNIFICANT CHANGE UP (ref 2.5–4.5)
PLATELET # BLD AUTO: 138 K/UL — LOW (ref 150–400)
PLATELET # BLD AUTO: 161 K/UL — SIGNIFICANT CHANGE UP (ref 150–400)
PO2 BLDV: 42 MMHG — SIGNIFICANT CHANGE UP (ref 25–45)
PO2 BLDV: 94 MMHG — HIGH (ref 25–45)
POTASSIUM SERPL-MCNC: 2.6 MMOL/L — CRITICAL LOW (ref 3.5–5.3)
POTASSIUM SERPL-MCNC: 2.8 MMOL/L — CRITICAL LOW (ref 3.5–5.3)
POTASSIUM SERPL-MCNC: 2.9 MMOL/L — CRITICAL LOW (ref 3.5–5.3)
POTASSIUM SERPL-MCNC: 3 MMOL/L — LOW (ref 3.5–5.3)
POTASSIUM SERPL-MCNC: 3.1 MMOL/L — LOW (ref 3.5–5.3)
POTASSIUM SERPL-SCNC: 2.6 MMOL/L — CRITICAL LOW (ref 3.5–5.3)
POTASSIUM SERPL-SCNC: 2.8 MMOL/L — CRITICAL LOW (ref 3.5–5.3)
POTASSIUM SERPL-SCNC: 2.9 MMOL/L — CRITICAL LOW (ref 3.5–5.3)
POTASSIUM SERPL-SCNC: 3 MMOL/L — LOW (ref 3.5–5.3)
POTASSIUM SERPL-SCNC: 3.1 MMOL/L — LOW (ref 3.5–5.3)
POTASSIUM UR-SCNC: 7 MMOL/L — SIGNIFICANT CHANGE UP
PROT ?TM UR-MCNC: 195 MG/DL — HIGH (ref 0–12)
PROT SERPL-MCNC: 5.8 G/DL — LOW (ref 6–8.3)
PROT SERPL-MCNC: 5.9 G/DL — LOW (ref 6–8.3)
PROT SERPL-MCNC: 6.7 G/DL — SIGNIFICANT CHANGE UP (ref 6–8.3)
PROT UR-MCNC: 300 MG/DL
PROT UR-MCNC: 300 MG/DL
PROT UR-MCNC: >=1000 MG/DL
PROT/CREAT UR-RTO: 7.5 RATIO — HIGH (ref 0–0.2)
PROTHROM AB SERPL-ACNC: 13.3 SEC — SIGNIFICANT CHANGE UP (ref 9.9–13.4)
RAPID RVP RESULT: DETECTED
RBC # BLD: 3.47 M/UL — LOW (ref 4.2–5.8)
RBC # BLD: 3.87 M/UL — LOW (ref 4.2–5.8)
RBC # FLD: 14.7 % — HIGH (ref 10.3–14.5)
RBC # FLD: 14.9 % — HIGH (ref 10.3–14.5)
RH IG SCN BLD-IMP: POSITIVE — SIGNIFICANT CHANGE UP
S PNEUM AG UR QL: NEGATIVE — SIGNIFICANT CHANGE UP
SAO2 % BLDV: 68.6 % — SIGNIFICANT CHANGE UP (ref 67–88)
SAO2 % BLDV: 97 % — HIGH (ref 67–88)
SARS-COV-2 RNA SPEC QL NAA+PROBE: SIGNIFICANT CHANGE UP
SIROLIMUS BLD-MCNC: 2.7 NG/ML — LOW (ref 4.5–14)
SODIUM SERPL-SCNC: 118 MMOL/L — CRITICAL LOW (ref 135–145)
SODIUM SERPL-SCNC: 118 MMOL/L — CRITICAL LOW (ref 135–145)
SODIUM SERPL-SCNC: 119 MMOL/L — CRITICAL LOW (ref 135–145)
SODIUM SERPL-SCNC: 121 MMOL/L — LOW (ref 135–145)
SODIUM SERPL-SCNC: 122 MMOL/L — LOW (ref 135–145)
SODIUM UR-SCNC: 57 MMOL/L — SIGNIFICANT CHANGE UP
SODIUM UR-SCNC: 67 MMOL/L — SIGNIFICANT CHANGE UP
SP GR SPEC: 1.02 — SIGNIFICANT CHANGE UP (ref 1–1.03)
TROPONIN T, HIGH SENSITIVITY RESULT: 115 NG/L — CRITICAL HIGH (ref 0–51)
UROBILINOGEN FLD QL: 0.2 MG/DL — SIGNIFICANT CHANGE UP (ref 0.2–1)
UUN UR-MCNC: 158 MG/DL — SIGNIFICANT CHANGE UP
WBC # BLD: 11.32 K/UL — HIGH (ref 3.8–10.5)
WBC # BLD: 12.57 K/UL — HIGH (ref 3.8–10.5)
WBC # FLD AUTO: 11.32 K/UL — HIGH (ref 3.8–10.5)
WBC # FLD AUTO: 12.57 K/UL — HIGH (ref 3.8–10.5)

## 2024-12-03 PROCEDURE — 93306 TTE W/DOPPLER COMPLETE: CPT | Mod: 26

## 2024-12-03 PROCEDURE — 99291 CRITICAL CARE FIRST HOUR: CPT | Mod: GC

## 2024-12-03 PROCEDURE — 70496 CT ANGIOGRAPHY HEAD: CPT | Mod: 26

## 2024-12-03 PROCEDURE — 99223 1ST HOSP IP/OBS HIGH 75: CPT

## 2024-12-03 PROCEDURE — 93308 TTE F-UP OR LMTD: CPT | Mod: 26,59

## 2024-12-03 PROCEDURE — 71260 CT THORAX DX C+: CPT | Mod: 26,MC

## 2024-12-03 PROCEDURE — 70498 CT ANGIOGRAPHY NECK: CPT | Mod: 26

## 2024-12-03 PROCEDURE — 72126 CT NECK SPINE W/DYE: CPT | Mod: 26

## 2024-12-03 PROCEDURE — 71045 X-RAY EXAM CHEST 1 VIEW: CPT | Mod: 26

## 2024-12-03 PROCEDURE — 93321 DOPPLER ECHO F-UP/LMTD STD: CPT | Mod: 26,59

## 2024-12-03 PROCEDURE — 51703 INSERT BLADDER CATH COMPLEX: CPT

## 2024-12-03 PROCEDURE — 70450 CT HEAD/BRAIN W/O DYE: CPT | Mod: 26,59

## 2024-12-03 PROCEDURE — 93010 ELECTROCARDIOGRAM REPORT: CPT

## 2024-12-03 PROCEDURE — 99223 1ST HOSP IP/OBS HIGH 75: CPT | Mod: GC

## 2024-12-03 PROCEDURE — 0042T: CPT

## 2024-12-03 RX ORDER — TAMSULOSIN HYDROCHLORIDE 0.4 MG/1
0.4 CAPSULE ORAL EVERY 24 HOURS
Refills: 0 | Status: DISCONTINUED | OUTPATIENT
Start: 2024-12-03 | End: 2024-12-13

## 2024-12-03 RX ORDER — FUROSEMIDE 40 MG/1
40 TABLET ORAL ONCE
Refills: 0 | Status: DISCONTINUED | OUTPATIENT
Start: 2024-12-03 | End: 2024-12-03

## 2024-12-03 RX ORDER — BENZONATATE 100 MG/1
100 CAPSULE ORAL THREE TIMES A DAY
Refills: 0 | Status: DISCONTINUED | OUTPATIENT
Start: 2024-12-03 | End: 2024-12-03

## 2024-12-03 RX ORDER — POTASSIUM CHLORIDE 600 MG/1
40 TABLET, EXTENDED RELEASE ORAL EVERY 6 HOURS
Refills: 0 | Status: COMPLETED | OUTPATIENT
Start: 2024-12-03 | End: 2024-12-04

## 2024-12-03 RX ORDER — ALLOPURINOL 300 MG/1
0 TABLET ORAL
Refills: 0 | DISCHARGE

## 2024-12-03 RX ORDER — VANCOMYCIN HCL 900 MCG/MG
1000 POWDER (GRAM) MISCELLANEOUS ONCE
Refills: 0 | Status: COMPLETED | OUTPATIENT
Start: 2024-12-03 | End: 2024-12-03

## 2024-12-03 RX ORDER — BUPROPION HCL 100 MG
150 TABLET ORAL DAILY
Refills: 0 | Status: DISCONTINUED | OUTPATIENT
Start: 2024-12-03 | End: 2024-12-03

## 2024-12-03 RX ORDER — PIPERACILLIN SODIUM AND TAZOBACTAM SODIUM 4; .5 G/20ML; G/20ML
4.5 INJECTION, POWDER, LYOPHILIZED, FOR SOLUTION INTRAVENOUS ONCE
Refills: 0 | Status: DISCONTINUED | OUTPATIENT
Start: 2024-12-03 | End: 2024-12-03

## 2024-12-03 RX ORDER — FLUTICASONE PROPIONATE AND SALMETEROL XINAFOATE 45; 21 UG/1; UG/1
1 AEROSOL, METERED RESPIRATORY (INHALATION)
Refills: 0 | Status: DISCONTINUED | OUTPATIENT
Start: 2024-12-03 | End: 2024-12-03

## 2024-12-03 RX ORDER — HEPARIN SODIUM,PORCINE 1000/ML
5000 VIAL (ML) INJECTION EVERY 8 HOURS
Refills: 0 | Status: DISCONTINUED | OUTPATIENT
Start: 2024-12-03 | End: 2024-12-13

## 2024-12-03 RX ORDER — IPRATROPIUM BROMIDE AND ALBUTEROL SULFATE 2.5; .5 MG/3ML; MG/3ML
3 SOLUTION RESPIRATORY (INHALATION) EVERY 6 HOURS
Refills: 0 | Status: DISCONTINUED | OUTPATIENT
Start: 2024-12-03 | End: 2024-12-12

## 2024-12-03 RX ORDER — SIROLIMUS 1 MG/1
2 TABLET ORAL DAILY
Refills: 0 | Status: DISCONTINUED | OUTPATIENT
Start: 2024-12-03 | End: 2024-12-03

## 2024-12-03 RX ORDER — SODIUM CHLORIDE 9 MG/ML
4 INJECTION, SOLUTION INTRAMUSCULAR; INTRAVENOUS; SUBCUTANEOUS EVERY 12 HOURS
Refills: 0 | Status: DISCONTINUED | OUTPATIENT
Start: 2024-12-03 | End: 2024-12-03

## 2024-12-03 RX ORDER — FAMOTIDINE 20 MG/1
20 TABLET, FILM COATED ORAL
Refills: 0 | Status: DISCONTINUED | OUTPATIENT
Start: 2024-12-03 | End: 2024-12-05

## 2024-12-03 RX ORDER — ONDANSETRON HYDROCHLORIDE 4 MG/1
4 TABLET, FILM COATED ORAL EVERY 8 HOURS
Refills: 0 | Status: DISCONTINUED | OUTPATIENT
Start: 2024-12-03 | End: 2024-12-05

## 2024-12-03 RX ORDER — POTASSIUM CHLORIDE 600 MG/1
10 TABLET, EXTENDED RELEASE ORAL ONCE
Refills: 0 | Status: COMPLETED | OUTPATIENT
Start: 2024-12-03 | End: 2024-12-03

## 2024-12-03 RX ORDER — SODIUM CHLORIDE 3 G/100ML
500 INJECTION, SOLUTION INTRAVENOUS
Refills: 0 | Status: DISCONTINUED | OUTPATIENT
Start: 2024-12-03 | End: 2024-12-04

## 2024-12-03 RX ORDER — PIPERACILLIN SODIUM AND TAZOBACTAM SODIUM 4; .5 G/20ML; G/20ML
4.5 INJECTION, POWDER, LYOPHILIZED, FOR SOLUTION INTRAVENOUS ONCE
Refills: 0 | Status: COMPLETED | OUTPATIENT
Start: 2024-12-03 | End: 2024-12-03

## 2024-12-03 RX ORDER — BUPROPION HCL 100 MG
1 TABLET ORAL
Refills: 0 | DISCHARGE

## 2024-12-03 RX ORDER — BENZONATATE 100 MG/1
1 CAPSULE ORAL
Refills: 0 | DISCHARGE

## 2024-12-03 RX ORDER — SODIUM CHLORIDE 9 MG/ML
0 INJECTION, SOLUTION INTRAMUSCULAR; INTRAVENOUS; SUBCUTANEOUS
Refills: 0 | DISCHARGE

## 2024-12-03 RX ORDER — POTASSIUM CHLORIDE 600 MG/1
10 TABLET, EXTENDED RELEASE ORAL
Refills: 0 | Status: COMPLETED | OUTPATIENT
Start: 2024-12-03 | End: 2024-12-03

## 2024-12-03 RX ORDER — NICARDIPINE HYDROCHLORIDE 2.5 MG/ML
5 INJECTION INTRAVENOUS
Qty: 40 | Refills: 0 | Status: DISCONTINUED | OUTPATIENT
Start: 2024-12-03 | End: 2024-12-03

## 2024-12-03 RX ORDER — HYDROCORTISONE BUTYRATE 0.1 %
1 CREAM (GRAM) TOPICAL
Refills: 0 | DISCHARGE

## 2024-12-03 RX ORDER — FAMOTIDINE 20 MG/1
1 TABLET, FILM COATED ORAL
Refills: 0 | DISCHARGE

## 2024-12-03 RX ORDER — LIDOCAINE 40 MG/G
1 CREAM TOPICAL ONCE
Refills: 0 | Status: COMPLETED | OUTPATIENT
Start: 2024-12-03 | End: 2024-12-03

## 2024-12-03 RX ORDER — ACETAMINOPHEN 500MG 500 MG/1
650 TABLET, COATED ORAL ONCE
Refills: 0 | Status: COMPLETED | OUTPATIENT
Start: 2024-12-03 | End: 2024-12-03

## 2024-12-03 RX ORDER — NAPHAZOLINE HCL/PHENIRAMINE 0.025-0.3%
2 DROPS OPHTHALMIC (EYE)
Refills: 0 | Status: DISCONTINUED | OUTPATIENT
Start: 2024-12-03 | End: 2024-12-13

## 2024-12-03 RX ORDER — PIPERACILLIN SODIUM AND TAZOBACTAM SODIUM 4; .5 G/20ML; G/20ML
4.5 INJECTION, POWDER, LYOPHILIZED, FOR SOLUTION INTRAVENOUS EVERY 8 HOURS
Refills: 0 | Status: COMPLETED | OUTPATIENT
Start: 2024-12-04 | End: 2024-12-10

## 2024-12-03 RX ORDER — FLUTICASONE PROPIONATE AND SALMETEROL XINAFOATE 45; 21 UG/1; UG/1
2 AEROSOL, METERED RESPIRATORY (INHALATION)
Refills: 0 | DISCHARGE

## 2024-12-03 RX ORDER — LIPASE/PROTEASE/AMYLASE 8K-30K-30K
1 TABLET ORAL
Refills: 0 | Status: DISCONTINUED | OUTPATIENT
Start: 2024-12-03 | End: 2024-12-04

## 2024-12-03 RX ORDER — PIPERACILLIN SODIUM AND TAZOBACTAM SODIUM 4; .5 G/20ML; G/20ML
4.5 INJECTION, POWDER, LYOPHILIZED, FOR SOLUTION INTRAVENOUS ONCE
Refills: 0 | Status: COMPLETED | OUTPATIENT
Start: 2024-12-04 | End: 2024-12-04

## 2024-12-03 RX ORDER — SIROLIMUS 1 MG/1
2 TABLET ORAL EVERY 24 HOURS
Refills: 0 | Status: DISCONTINUED | OUTPATIENT
Start: 2024-12-03 | End: 2024-12-03

## 2024-12-03 RX ORDER — POTASSIUM CHLORIDE 600 MG/1
40 TABLET, EXTENDED RELEASE ORAL EVERY 6 HOURS
Refills: 0 | Status: DISCONTINUED | OUTPATIENT
Start: 2024-12-03 | End: 2024-12-03

## 2024-12-03 RX ORDER — ACITRETIN 10 MG/1
1 CAPSULE ORAL
Refills: 0 | DISCHARGE

## 2024-12-03 RX ORDER — ONDANSETRON HYDROCHLORIDE 4 MG/1
4 TABLET, FILM COATED ORAL EVERY 8 HOURS
Refills: 0 | Status: DISCONTINUED | OUTPATIENT
Start: 2024-12-03 | End: 2024-12-03

## 2024-12-03 RX ORDER — POTASSIUM CHLORIDE 600 MG/1
10 TABLET, EXTENDED RELEASE ORAL
Refills: 0 | Status: COMPLETED | OUTPATIENT
Start: 2024-12-03 | End: 2024-12-04

## 2024-12-03 RX ORDER — AZITHROMYCIN 250 MG/1
500 TABLET, FILM COATED ORAL EVERY 24 HOURS
Refills: 0 | Status: DISCONTINUED | OUTPATIENT
Start: 2024-12-03 | End: 2024-12-03

## 2024-12-03 RX ORDER — FLUTICASONE PROPIONATE AND SALMETEROL XINAFOATE 45; 21 UG/1; UG/1
1 AEROSOL, METERED RESPIRATORY (INHALATION)
Refills: 0 | Status: DISCONTINUED | OUTPATIENT
Start: 2024-12-03 | End: 2024-12-13

## 2024-12-03 RX ORDER — LABETALOL 100 MG/1
10 TABLET, FILM COATED ORAL ONCE
Refills: 0 | Status: COMPLETED | OUTPATIENT
Start: 2024-12-03 | End: 2024-12-03

## 2024-12-03 RX ORDER — SIROLIMUS 1 MG/1
2 TABLET ORAL EVERY 24 HOURS
Refills: 0 | Status: DISCONTINUED | OUTPATIENT
Start: 2024-12-03 | End: 2024-12-06

## 2024-12-03 RX ORDER — FUROSEMIDE 40 MG/1
60 TABLET ORAL ONCE
Refills: 0 | Status: COMPLETED | OUTPATIENT
Start: 2024-12-03 | End: 2024-12-03

## 2024-12-03 RX ORDER — SODIUM CHLORIDE 9 MG/ML
4 INJECTION, SOLUTION INTRAMUSCULAR; INTRAVENOUS; SUBCUTANEOUS
Refills: 0 | Status: DISCONTINUED | OUTPATIENT
Start: 2024-12-03 | End: 2024-12-03

## 2024-12-03 RX ORDER — FAMOTIDINE 20 MG/1
20 TABLET, FILM COATED ORAL
Refills: 0 | Status: DISCONTINUED | OUTPATIENT
Start: 2024-12-03 | End: 2024-12-03

## 2024-12-03 RX ORDER — SODIUM CHLORIDE 9 MG/ML
4 INJECTION, SOLUTION INTRAMUSCULAR; INTRAVENOUS; SUBCUTANEOUS EVERY 12 HOURS
Refills: 0 | Status: DISCONTINUED | OUTPATIENT
Start: 2024-12-03 | End: 2024-12-13

## 2024-12-03 RX ORDER — FLUOCINOLONE ACETONIDE 0.25 MG/G
1 CREAM TOPICAL
Refills: 0 | DISCHARGE

## 2024-12-03 RX ADMIN — TAMSULOSIN HYDROCHLORIDE 0.4 MILLIGRAM(S): 0.4 CAPSULE ORAL at 19:34

## 2024-12-03 RX ADMIN — Medication 80 MILLIGRAM(S): at 19:34

## 2024-12-03 RX ADMIN — POTASSIUM CHLORIDE 100 MILLIEQUIVALENT(S): 600 TABLET, EXTENDED RELEASE ORAL at 00:50

## 2024-12-03 RX ADMIN — PIPERACILLIN SODIUM AND TAZOBACTAM SODIUM 25 GRAM(S): 4; .5 INJECTION, POWDER, LYOPHILIZED, FOR SOLUTION INTRAVENOUS at 11:46

## 2024-12-03 RX ADMIN — IPRATROPIUM BROMIDE AND ALBUTEROL SULFATE 3 MILLILITER(S): 2.5; .5 SOLUTION RESPIRATORY (INHALATION) at 22:40

## 2024-12-03 RX ADMIN — Medication 2 MILLIGRAM(S): at 19:45

## 2024-12-03 RX ADMIN — SIROLIMUS 2 MILLIGRAM(S): 1 TABLET ORAL at 16:05

## 2024-12-03 RX ADMIN — Medication 5 MILLIGRAM(S): at 22:40

## 2024-12-03 RX ADMIN — POTASSIUM CHLORIDE 100 MILLIEQUIVALENT(S): 600 TABLET, EXTENDED RELEASE ORAL at 16:50

## 2024-12-03 RX ADMIN — Medication 5000 UNIT(S): at 09:15

## 2024-12-03 RX ADMIN — POTASSIUM CHLORIDE 100 MILLIEQUIVALENT(S): 600 TABLET, EXTENDED RELEASE ORAL at 04:27

## 2024-12-03 RX ADMIN — SODIUM CHLORIDE 4 MILLILITER(S): 9 INJECTION, SOLUTION INTRAMUSCULAR; INTRAVENOUS; SUBCUTANEOUS at 09:16

## 2024-12-03 RX ADMIN — NICARDIPINE HYDROCHLORIDE 25 MG/HR: 2.5 INJECTION INTRAVENOUS at 00:44

## 2024-12-03 RX ADMIN — POTASSIUM CHLORIDE 100 MILLIEQUIVALENT(S): 600 TABLET, EXTENDED RELEASE ORAL at 02:26

## 2024-12-03 RX ADMIN — ACETAMINOPHEN 500MG 650 MILLIGRAM(S): 500 TABLET, COATED ORAL at 17:51

## 2024-12-03 RX ADMIN — LIDOCAINE 1 PATCH: 40 CREAM TOPICAL at 17:53

## 2024-12-03 RX ADMIN — POTASSIUM CHLORIDE 100 MILLIEQUIVALENT(S): 600 TABLET, EXTENDED RELEASE ORAL at 09:07

## 2024-12-03 RX ADMIN — Medication 25 GRAM(S): at 06:55

## 2024-12-03 RX ADMIN — PIPERACILLIN SODIUM AND TAZOBACTAM SODIUM 200 GRAM(S): 4; .5 INJECTION, POWDER, LYOPHILIZED, FOR SOLUTION INTRAVENOUS at 08:02

## 2024-12-03 RX ADMIN — Medication 5000 UNIT(S): at 17:13

## 2024-12-03 RX ADMIN — SODIUM CHLORIDE 30 MILLILITER(S): 3 INJECTION, SOLUTION INTRAVENOUS at 17:12

## 2024-12-03 RX ADMIN — FLUTICASONE PROPIONATE AND SALMETEROL XINAFOATE 1 DOSE(S): 45; 21 AEROSOL, METERED RESPIRATORY (INHALATION) at 09:14

## 2024-12-03 RX ADMIN — LABETALOL 10 MILLIGRAM(S): 100 TABLET, FILM COATED ORAL at 10:20

## 2024-12-03 RX ADMIN — Medication 25 GRAM(S): at 11:12

## 2024-12-03 RX ADMIN — Medication 250 MILLIGRAM(S): at 07:42

## 2024-12-03 RX ADMIN — POTASSIUM CHLORIDE 100 MILLIEQUIVALENT(S): 600 TABLET, EXTENDED RELEASE ORAL at 10:44

## 2024-12-03 RX ADMIN — FUROSEMIDE 60 MILLIGRAM(S): 40 TABLET ORAL at 00:50

## 2024-12-03 RX ADMIN — Medication 81 MILLIGRAM(S): at 19:35

## 2024-12-03 RX ADMIN — AZITHROMYCIN 255 MILLIGRAM(S): 250 TABLET, FILM COATED ORAL at 10:25

## 2024-12-03 RX ADMIN — IPRATROPIUM BROMIDE AND ALBUTEROL SULFATE 3 MILLILITER(S): 2.5; .5 SOLUTION RESPIRATORY (INHALATION) at 17:53

## 2024-12-03 RX ADMIN — SODIUM CHLORIDE 4 MILLILITER(S): 9 INJECTION, SOLUTION INTRAMUSCULAR; INTRAVENOUS; SUBCUTANEOUS at 07:35

## 2024-12-03 RX ADMIN — POTASSIUM CHLORIDE 100 MILLIEQUIVALENT(S): 600 TABLET, EXTENDED RELEASE ORAL at 22:40

## 2024-12-03 RX ADMIN — Medication 2 DROP(S): at 22:41

## 2024-12-03 RX ADMIN — POTASSIUM CHLORIDE 100 MILLIEQUIVALENT(S): 600 TABLET, EXTENDED RELEASE ORAL at 19:36

## 2024-12-03 RX ADMIN — PIPERACILLIN SODIUM AND TAZOBACTAM SODIUM 25 GRAM(S): 4; .5 INJECTION, POWDER, LYOPHILIZED, FOR SOLUTION INTRAVENOUS at 17:14

## 2024-12-03 RX ADMIN — POTASSIUM CHLORIDE 100 MILLIEQUIVALENT(S): 600 TABLET, EXTENDED RELEASE ORAL at 11:49

## 2024-12-03 RX ADMIN — LIDOCAINE 1 PATCH: 40 CREAM TOPICAL at 20:43

## 2024-12-03 NOTE — ED PROVIDER NOTE - PROGRESS NOTE DETAILS
Additional potassium riders ordered, patient improved on BiPAP respiratory status improved, repeat sodium confirmed 118 MICU attending at bedside urine sodium high we will hold off on hypertonic was given Lasix for pulmonary edema repeat echo by cardiology fellow shows EF returned to 45% which is patient's baseline, patient off Cardene drip may need to be restarted will defer to MICU team for stepdown, MICU attending states patient is stable for stepdown at this time.

## 2024-12-03 NOTE — ED PROVIDER NOTE - CLINICAL SUMMARY MEDICAL DECISION MAKING FREE TEXT BOX
79-year-old male in acute hypertensive crisis with possible CVA and acute pulmonary edema stroke code called on arrival   patient placed on BiPAP   POCUS echo severely depressed ejection fraction 10 to 15% bilateral B-lines no pericardial effusion no RV strain no dissection flap plethoric IVC   EKG sinus tachycardia 120 left axis LVH no STEMI nonspecific T wave inversions with multiple PVCs on monitor   patient is hypertensive taken for stat stroke protocol CT head CTA given fall and facial abrasions added on CT chest with as recon, as well as CT cervical spine with as recon,      MDM–patient initiated on BiPAP started on nicardipine drip CT head grossly negative for intracranial hemorrhage stroke team at the bedside consulted cardiology for possible CCU admission, BMP returns with sodium 118 potentially patient may have had a seizure in the setting of a hypertensive crisis, has been having URI symptoms for last 2 to 3 days, worsening leg swelling, patient is improved on BiPAP was started on nicardipine 5  titrated down to 2.5 with repeat blood pressure 140/90 patient feels significantly improved on BiPAP troponin and proBNP elevated significantly secondary to demand and aPE potassium 3.0 ordered IV K consulted MICU stroke team stating patient should go to MICU,

## 2024-12-03 NOTE — H&P ADULT - NSHPPHYSICALEXAM_GEN_ALL_CORE
PHYSICAL EXAM  General: laying comfortably, in no acute distress   HEENT: left temple with bruising and scabbing, mucous membranes dry, sclera anicteric   Lungs: anterior and posterior lung fields clear to auscultation bilaterally, no wheezes  Heart: tachycardic, normal S1 S2    Abdomen: soft, non-tender, non-distended  Extremities: atraumatic, bilateral 1+ edema to the knee, WWP, capillary refill <3 seconds   Skin: normal skin texture and turgor without rashes or lesions, no diaphoresis   Neuro: A&Ox3, hoarse & slurred speech, moving all extremities spontaneously

## 2024-12-03 NOTE — H&P ADULT - NSHPLABSRESULTS_GEN_ALL_CORE
LABS:                        11.8   12.57 )-----------( 161      ( 03 Dec 2024 00:02 )             35.6     12    118[LL]  |  81[L]  |  24[H]  ----------------------------<  168[H]  2.9[LL]   |  25  |  2.11[H]    Ca    8.4      03 Dec 2024 01:06  Mg     1.3         TPro  5.8[L]  /  Alb  3.1[L]  /  TBili  0.6  /  DBili  x   /  AST  23  /  ALT  12  /  AlkPhos  148[H]  12    PT/INR - ( 03 Dec 2024 00:02 )   PT: 13.3 sec;   INR: 1.14          PTT - ( 03 Dec 2024 00:02 )  PTT:33.5 sec  Urinalysis Basic - ( 03 Dec 2024 01:53 )    Color: Yellow / Appearance: Clear / S.016 / pH: x  Gluc: x / Ketone: Negative mg/dL  / Bili: Negative / Urobili: 0.2 mg/dL   Blood: x / Protein: >=1000 mg/dL / Nitrite: Negative   Leuk Esterase: Negative / RBC: 26 /HPF / WBC 2 /HPF   Sq Epi: x / Non Sq Epi: 0 /HPF / Bacteria: Negative /HPF    Lactate, Blood: 2.1 mmol/L *H* (24 @ 00:02)      CARDS:  EKG - normal axis, sinus tachycardia w/ premature supraventricular complexes; no ST elevations or depressions      MICRO:    Urinalysis with Rflx Culture (collected 03 Dec 2024 01:53)      RADIOLOGY:    < from: CT Cervical Spine w/ IV Cont (24 @ 00:46) >  IMPRESSION:  No acute fracture or traumatic subluxation.    Multi-level degenerative changes.    < end of copied text >    < from: CT Chest w/ IV Cont (24 @ 00:46) >    ******PRELIMINARY REPORT******    IMPRESSION:  1.   No evidence of pulmonary embolism.  2.   Multiple cavitary and soft tissue mass lesions in bilateral lungs as  described above. Findings are worrisome for metastatic disease . Consider  non-emergent PET/CT or tissue sampling.(Reference: Ethel)  3.   Small left pleural effusion.  4.   Severe centrilobular emphysematous changes bilaterally.    REFERENCES:  Ethel TRAN, et al. Guidelines for Management of Incidental Pulmonary   Nodules  Detected on CT Images: From the Fleischner Society 2017. Radiology.  2017;284(1):228-243.    ******PRELIMINARY REPORT******   < end of copied text >    < from: CT Angio Neck Stroke Protocol w/ IV Cont (24 @ 00:46) >  IMPRESSION:    CT PERFUSION:  Essentially nondiagnostic examination due to patient motion.    CTA NECK:  1.  No evidence of hemodynamically significant stenosis or occlusion.  2.  Calcified and noncalcified plaques contribute to less than 50%   stenosis of the bilateral proximal internal carotid arteries.  3.  3.5 cm right apical thick-walled cavitation and additional lung   findings as above. Recommend follow-up.    CTA HEAD:  No large vessel occlusion, significant stenosis or vascular abnormality   identified.        --- End of Report ---

## 2024-12-03 NOTE — CONSULT NOTE ADULT - SUBJECTIVE AND OBJECTIVE BOX
HPI:      PHYSICAL EXAM:  General:NAD.   HEENT: NC/AT; PERRL, anicteric sclera; MMM  Neck: supple  Cardiovascular: +S1/S2, RRR  Respiratory: CTA B/L; no W/R/R  Gastrointestinal: soft, NT/ND; +BSx4  Extremities: WWP; no edema, clubbing or cyanosis  Vascular: 2+ radial, DP/PT pulses B/L  Neurological: AAOx3; no focal deficits  Psychiatric: pleasant mood and affect  Dermatologic: no appreciable wounds or damage to the skin    VITAL SIGNS:  Vital Signs Last 24 Hrs  T(C): 35.7 (03 Dec 2024 10:01), Max: 36.5 (03 Dec 2024 05:42)  T(F): 96.3 (03 Dec 2024 10:01), Max: 97.7 (03 Dec 2024 05:42)  HR: 66 (03 Dec 2024 10:40) (66 - 120)  BP: 129/80 (03 Dec 2024 10:40) (129/80 - 200/127)  BP(mean): 100 (03 Dec 2024 10:40) (100 - 162)  RR: 15 (03 Dec 2024 10:40) (15 - 24)  SpO2: 97% (03 Dec 2024 10:40) (95% - 99%)  Parameters below as of 03 Dec 2024 10:40  Patient On (Oxygen Delivery Method): nasal cannula  O2 Flow (L/min): 2    MEDICATIONS:  MEDICATIONS  (STANDING):  azithromycin  IVPB 500 milliGRAM(s) IV Intermittent every 24 hours  buPROPion XL (24-Hour) . 150 milliGRAM(s) Oral daily  famotidine    Tablet 20 milliGRAM(s) Oral two times a day  finasteride 5 milliGRAM(s) Oral daily  fluticasone propionate/ salmeterol 100-50 MICROgram(s) Diskus 1 Dose(s) Inhalation two times a day  heparin   Injectable 5000 Unit(s) SubCutaneous every 8 hours  loperamide 2 milliGRAM(s) Oral four times a day  piperacillin/tazobactam IVPB.- 4.5 Gram(s) IV Intermittent once  sirolimus 2 milliGRAM(s) Oral daily  sodium chloride 3%  Inhalation 4 milliLiter(s) Inhalation <User Schedule>  sodium chloride 3%  Inhalation 4 milliLiter(s) Inhalation every 12 hours  tiotropium 2.5 MICROgram(s) Inhaler 2 Puff(s) Inhalation <User Schedule>    MEDICATIONS  (PRN):  benzonatate 100 milliGRAM(s) Oral three times a day PRN for cough  ondansetron    Tablet 4 milliGRAM(s) Oral every 8 hours PRN for nausea    ALLERGIES:  Allergies  NSAIDs (Pruritus)  Aleve (Unknown)  Intolerances    LABS:                        10.6   11.32 )-----------( 138      ( 03 Dec 2024 06:46 )             31.6     12-03    121[L]  |  85[L]  |  25[H]  ----------------------------<  125[H]  2.8[LL]   |  23  |  2.17[H]    Ca    8.6      03 Dec 2024 06:46  Phos  3.6     12-03  Mg     1.4     12-03    TPro  5.9[L]  /  Alb  3.1[L]  /  TBili  0.4  /  DBili  x   /  AST  28  /  ALT  12  /  AlkPhos  139[H]  12-03  PT/INR - ( 03 Dec 2024 00:02 )   PT: 13.3 sec;   INR: 1.14     PTT - ( 03 Dec 2024 00:02 )  PTT:33.5 sec  Urinalysis Basic - ( 03 Dec 2024 06:46 )  Color: x / Appearance: x / SG: x / pH: x  Gluc: 125 mg/dL / Ketone: x  / Bili: x / Urobili: x   Blood: x / Protein: x / Nitrite: x   Leuk Esterase: x / RBC: x / WBC x   Sq Epi: x / Non Sq Epi: x / Bacteria: x    CAPILLARY BLOOD GLUCOSE  209 (03 Dec 2024 01:21)  POCT Blood Glucose.: 162 mg/dL (03 Dec 2024 12:03)  RADIOLOGY & ADDITIONAL TESTS: Reviewed.

## 2024-12-03 NOTE — CONSULT NOTE ADULT - ATTENDING COMMENTS
I have reviewed patient status and discussed at length. I agree with findings and plans.  extensive differential diagnosis as outlined above is under active consideration and evaluation.

## 2024-12-03 NOTE — PROGRESS NOTE ADULT - SUBJECTIVE AND OBJECTIVE BOX
Neurology Stroke Progress Note    INTERVAL HPI/OVERNIGHT EVENTS:  Patient seen and examined.  number ______ used.    MEDICATIONS  (STANDING):  azithromycin  IVPB 500 milliGRAM(s) IV Intermittent every 24 hours  buPROPion XL (24-Hour) . 150 milliGRAM(s) Oral daily  famotidine    Tablet 20 milliGRAM(s) Oral two times a day  finasteride 5 milliGRAM(s) Oral daily  fluticasone propionate/ salmeterol 100-50 MICROgram(s) Diskus 1 Dose(s) Inhalation two times a day  heparin   Injectable 5000 Unit(s) SubCutaneous every 8 hours  loperamide 2 milliGRAM(s) Oral four times a day  piperacillin/tazobactam IVPB.- 4.5 Gram(s) IV Intermittent once  sirolimus 2 milliGRAM(s) Oral daily  sodium chloride 3%  Inhalation 4 milliLiter(s) Inhalation <User Schedule>  sodium chloride 3%  Inhalation 4 milliLiter(s) Inhalation every 12 hours  tiotropium 2.5 MICROgram(s) Inhaler 2 Puff(s) Inhalation <User Schedule>    MEDICATIONS  (PRN):  benzonatate 100 milliGRAM(s) Oral three times a day PRN for cough  ondansetron    Tablet 4 milliGRAM(s) Oral every 8 hours PRN for nausea      Allergies    NSAIDs (Pruritus)  Aleve (Unknown)    Intolerances        Vital Signs Last 24 Hrs  T(C): 35.7 (03 Dec 2024 10:01), Max: 36.5 (03 Dec 2024 05:42)  T(F): 96.3 (03 Dec 2024 10:01), Max: 97.7 (03 Dec 2024 05:42)  HR: 66 (03 Dec 2024 10:40) (66 - 120)  BP: 129/80 (03 Dec 2024 10:40) (129/80 - 200/127)  BP(mean): 100 (03 Dec 2024 10:40) (100 - 162)  RR: 15 (03 Dec 2024 10:40) (15 - 24)  SpO2: 97% (03 Dec 2024 10:40) (95% - 99%)    Parameters below as of 03 Dec 2024 10:40  Patient On (Oxygen Delivery Method): nasal cannula  O2 Flow (L/min): 2      Physical exam:  General: No acute distress, awake and alert  Eyes: Anicteric sclerae, moist conjunctivae, see below for CNs  Neck: trachea midline,  Cardiovascular: Regular rate and rhythm, no murmurs  Pulmonary: Anterior breath sounds clear bilaterally No use of accessory muscles  GI: Abdomen soft, non-distended, non-tender  Extremities: no edema    Neurologic:  -Mental status: Awake, alert, oriented to person, place, and time. Speech is fluent with intact naming, repetition, and comprehension, no dysarthria. Recent and remote memory intact. Follows commands. Attention/concentration intact. Fund of knowledge appropriate.  -Cranial nerves:   II: Visual fields are full to confrontation.  III, IV, VI: Extraocular movements are intact without nystagmus. Pupils equally round and reactive to light  V:  Facial sensation V1-V3 equal and intact   VII: Face is symmetric with normal eye closure and smile  VIII: Hearing is bilaterally intact to finger rub  IX, X: Uvula is midline and soft palate rises symmetrically  XI: Head turning and shoulder shrug are intact.  XII: Tongue protrudes midline  Motor: Normal bulk and tone. No pronator drift. Strength bilateral upper extremity 5/5, bilateral lower extremities 5/5.  Rapid alternating movements intact and symmetric  Sensation: Intact to light touch bilaterally. No neglect or extinction on double simultaneous testing.  Coordination: No dysmetria on finger-to-nose and heel-to-shin bilaterally  Reflexes: Downgoing toes bilaterally   Gait: Narrow gait and steady    LABS:                        10.6   11.32 )-----------( 138      ( 03 Dec 2024 06:46 )             31.6     12-03    121[L]  |  85[L]  |  25[H]  ----------------------------<  125[H]  2.8[LL]   |  23  |  2.17[H]    Ca    8.6      03 Dec 2024 06:46  Phos  3.6     12-03  Mg     1.4     12-03    TPro  5.9[L]  /  Alb  3.1[L]  /  TBili  0.4  /  DBili  x   /  AST  28  /  ALT  12  /  AlkPhos  139[H]  12-03    PT/INR - ( 03 Dec 2024 00:02 )   PT: 13.3 sec;   INR: 1.14          PTT - ( 03 Dec 2024 00:02 )  PTT:33.5 sec  Urinalysis Basic - ( 03 Dec 2024 06:46 )    Color: x / Appearance: x / SG: x / pH: x  Gluc: 125 mg/dL / Ketone: x  / Bili: x / Urobili: x   Blood: x / Protein: x / Nitrite: x   Leuk Esterase: x / RBC: x / WBC x   Sq Epi: x / Non Sq Epi: x / Bacteria: x        RADIOLOGY & ADDITIONAL TESTS:     Neurology Stroke Progress Note    INTERVAL HPI/OVERNIGHT EVENTS:  Patient seen and examined. Patient was stuporous during examination, requiring stimulation multiple times.     MEDICATIONS  (STANDING):  azithromycin  IVPB 500 milliGRAM(s) IV Intermittent every 24 hours  buPROPion XL (24-Hour) . 150 milliGRAM(s) Oral daily  famotidine    Tablet 20 milliGRAM(s) Oral two times a day  finasteride 5 milliGRAM(s) Oral daily  fluticasone propionate/ salmeterol 100-50 MICROgram(s) Diskus 1 Dose(s) Inhalation two times a day  heparin   Injectable 5000 Unit(s) SubCutaneous every 8 hours  loperamide 2 milliGRAM(s) Oral four times a day  piperacillin/tazobactam IVPB.- 4.5 Gram(s) IV Intermittent once  sirolimus 2 milliGRAM(s) Oral daily  sodium chloride 3%  Inhalation 4 milliLiter(s) Inhalation <User Schedule>  sodium chloride 3%  Inhalation 4 milliLiter(s) Inhalation every 12 hours  tiotropium 2.5 MICROgram(s) Inhaler 2 Puff(s) Inhalation <User Schedule>    MEDICATIONS  (PRN):  benzonatate 100 milliGRAM(s) Oral three times a day PRN for cough  ondansetron    Tablet 4 milliGRAM(s) Oral every 8 hours PRN for nausea      Allergies    NSAIDs (Pruritus)  Aleve (Unknown)    Intolerances        Vital Signs Last 24 Hrs  T(C): 35.7 (03 Dec 2024 10:01), Max: 36.5 (03 Dec 2024 05:42)  T(F): 96.3 (03 Dec 2024 10:01), Max: 97.7 (03 Dec 2024 05:42)  HR: 66 (03 Dec 2024 10:40) (66 - 120)  BP: 129/80 (03 Dec 2024 10:40) (129/80 - 200/127)  BP(mean): 100 (03 Dec 2024 10:40) (100 - 162)  RR: 15 (03 Dec 2024 10:40) (15 - 24)  SpO2: 97% (03 Dec 2024 10:40) (95% - 99%)    Parameters below as of 03 Dec 2024 10:40  Patient On (Oxygen Delivery Method): nasal cannula  O2 Flow (L/min): 2      Physical exam:  General: Stuporous, required sternal rubs for examination. Has bruising on R side of face and R arm   Eyes: Anicteric sclerae, moist conjunctivae, see below for CNs  Neck: trachea midline,  Pulmonary: Breathing comfortably on room air, no use of accessory muscles  Extremities: no edema    Neurologic:  -Mental status: Stuporous required sternal rubs for examination. Oriented to person only with choices. Patient nodded "yes" to his name. Unintelligible speech, groans occasionally. Follows simple commands with a lot of prompting. Diminished attention/concentration intact.   -Cranial nerves:   II: blink to threat   III, IV, VI: Extraocular movements are intact without nystagmus. Pupils equally round and reactive to light  V:  Facial sensation V1-V3 equal and intact   VII: Face is symmetric with normal eye closure and smile  VIII: Hearing is bilaterally intact to finger rub  IX, X: Uvula is midline and soft palate rises symmetrically  XI: Head turning and shoulder shrug are intact.  XII: Tongue protrudes midline  Motor: Normal bulk and tone. No pronator drift. Strength bilateral upper extremity 5/5, bilateral lower extremities 5/5.  Rapid alternating movements intact and symmetric  Sensation: Intact to light touch bilaterally. No neglect or extinction on double simultaneous testing.  Coordination: No dysmetria on finger-to-nose and heel-to-shin bilaterally  Reflexes: Downgoing toes bilaterally   Gait: Narrow gait and steady    LABS:                        10.6   11.32 )-----------( 138      ( 03 Dec 2024 06:46 )             31.6     12-03    121[L]  |  85[L]  |  25[H]  ----------------------------<  125[H]  2.8[LL]   |  23  |  2.17[H]    Ca    8.6      03 Dec 2024 06:46  Phos  3.6     12-03  Mg     1.4     12-03    TPro  5.9[L]  /  Alb  3.1[L]  /  TBili  0.4  /  DBili  x   /  AST  28  /  ALT  12  /  AlkPhos  139[H]  12-03    PT/INR - ( 03 Dec 2024 00:02 )   PT: 13.3 sec;   INR: 1.14          PTT - ( 03 Dec 2024 00:02 )  PTT:33.5 sec  Urinalysis Basic - ( 03 Dec 2024 06:46 )    Color: x / Appearance: x / SG: x / pH: x  Gluc: 125 mg/dL / Ketone: x  / Bili: x / Urobili: x   Blood: x / Protein: x / Nitrite: x   Leuk Esterase: x / RBC: x / WBC x   Sq Epi: x / Non Sq Epi: x / Bacteria: x        RADIOLOGY & ADDITIONAL TESTS:     Neurology Stroke Progress Note    INTERVAL HPI/OVERNIGHT EVENTS:  Patient seen and examined. Patient was stuporous during examination, requiring stimulation multiple times.     MEDICATIONS  (STANDING):  azithromycin  IVPB 500 milliGRAM(s) IV Intermittent every 24 hours  buPROPion XL (24-Hour) . 150 milliGRAM(s) Oral daily  famotidine    Tablet 20 milliGRAM(s) Oral two times a day  finasteride 5 milliGRAM(s) Oral daily  fluticasone propionate/ salmeterol 100-50 MICROgram(s) Diskus 1 Dose(s) Inhalation two times a day  heparin   Injectable 5000 Unit(s) SubCutaneous every 8 hours  loperamide 2 milliGRAM(s) Oral four times a day  piperacillin/tazobactam IVPB.- 4.5 Gram(s) IV Intermittent once  sirolimus 2 milliGRAM(s) Oral daily  sodium chloride 3%  Inhalation 4 milliLiter(s) Inhalation <User Schedule>  sodium chloride 3%  Inhalation 4 milliLiter(s) Inhalation every 12 hours  tiotropium 2.5 MICROgram(s) Inhaler 2 Puff(s) Inhalation <User Schedule>    MEDICATIONS  (PRN):  benzonatate 100 milliGRAM(s) Oral three times a day PRN for cough  ondansetron    Tablet 4 milliGRAM(s) Oral every 8 hours PRN for nausea      Allergies    NSAIDs (Pruritus)  Aleve (Unknown)    Intolerances        Vital Signs Last 24 Hrs  T(C): 35.7 (03 Dec 2024 10:01), Max: 36.5 (03 Dec 2024 05:42)  T(F): 96.3 (03 Dec 2024 10:01), Max: 97.7 (03 Dec 2024 05:42)  HR: 66 (03 Dec 2024 10:40) (66 - 120)  BP: 129/80 (03 Dec 2024 10:40) (129/80 - 200/127)  BP(mean): 100 (03 Dec 2024 10:40) (100 - 162)  RR: 15 (03 Dec 2024 10:40) (15 - 24)  SpO2: 97% (03 Dec 2024 10:40) (95% - 99%)    Parameters below as of 03 Dec 2024 10:40  Patient On (Oxygen Delivery Method): nasal cannula  O2 Flow (L/min): 2      Physical exam:  General: Stuporous, required sternal rubs for examination. Has bruising on R side of face and R arm   Eyes: Anicteric sclerae, moist conjunctivae, see below for CNs  Neck: trachea midline,  Pulmonary: Breathing comfortably on room air, no use of accessory muscles  Extremities: no edema    Neurologic:  -Mental status: Stuporous required sternal rubs for examination. Oriented to person only with choices. Patient nodded "yes" to his name. Unintelligible speech, groans occasionally. Follows simple commands with a lot of prompting. Diminished attention/concentration intact.   -Cranial nerves:   II: blink to threat absent b/l  III, IV, VI: Extraocular movements unable to assess, patient not participating to exam. Pupils equally round and reactive to light   VII: Face is symmetric with normal eye closure  VIII: Hearing is grossly intact to voice   Motor: Normal bulk and tone. Wiggles toes b/l on command, and seen moving arms spontaneously  Sensation: Intact to light touch bilaterally. No neglect or extinction on double simultaneous testing.  Coordination: unable to assess due to lack of participation     LABS:                        10.6   11.32 )-----------( 138      ( 03 Dec 2024 06:46 )             31.6     12-03    121[L]  |  85[L]  |  25[H]  ----------------------------<  125[H]  2.8[LL]   |  23  |  2.17[H]    Ca    8.6      03 Dec 2024 06:46  Phos  3.6     12-03  Mg     1.4     12-03    TPro  5.9[L]  /  Alb  3.1[L]  /  TBili  0.4  /  DBili  x   /  AST  28  /  ALT  12  /  AlkPhos  139[H]  12-03    PT/INR - ( 03 Dec 2024 00:02 )   PT: 13.3 sec;   INR: 1.14          PTT - ( 03 Dec 2024 00:02 )  PTT:33.5 sec  Urinalysis Basic - ( 03 Dec 2024 06:46 )    Color: x / Appearance: x / SG: x / pH: x  Gluc: 125 mg/dL / Ketone: x  / Bili: x / Urobili: x   Blood: x / Protein: x / Nitrite: x   Leuk Esterase: x / RBC: x / WBC x   Sq Epi: x / Non Sq Epi: x / Bacteria: x        RADIOLOGY & ADDITIONAL TESTS:

## 2024-12-03 NOTE — CONSULT NOTE ADULT - ATTENDING COMMENTS
H/o NTM managed by Dr. Mcclure at [facility] [place].  Airway clearance, zosyn.  Will compare current CT to old from [facility] [place] to assess change. H/o NTM managed by Dr. Daugherty at [facility] [place].  Airway clearance, zosyn.  Will compare current CT to old from [facility] [place] to assess change.

## 2024-12-03 NOTE — CONSULT NOTE ADULT - SUBJECTIVE AND OBJECTIVE BOX
**STROKE CODE CONSULT NOTE**    Last known well time: 11:15pm    HPI: 79y Male with PMHx of COPD, CKD stage 4, Crohn's disease hx of liver transplant presents to [facility] ED for fall and hypertension. Pt accompanied by wife who provided some collateral. States she saw him normal prior to him going to his shower ~2315, states he felt fatigued today and was moaning/groaning but otherwise was at baseline. Wife states she heard a thud in the shower and found him laying in the shower with garbled speech. Stroke code called as pre-notification. On arrival to [facility] ED, pt was hypertensive with systolics to 200s. Delay in obtaining CT imaging as pt was in respiratory distresses and required to be placed in bipap. NIHSS 4, notable for LUE/LLE dysmetria. Pt states he felt dizzy prior to him falling in the shower. Denies headache, changes in vision, weakness on one side, word finding difficulties.     T(C): 36.4 (24 @ 23:57), Max: 36.4 (24 @ 23:57)  HR: 106 (24 @ 02:14) (106 - 120)  BP: 149/92 (24 @ 02:14) (147/100 - 200/127)  RR: 24 (24 @ 02:14) (24 - 24)  SpO2: 98% (24 @ 02:14) (98% - 99%)    PAST MEDICAL & SURGICAL HISTORY:  Chronic obstructive bronchitis      Other ascites      Cirrhosis      Spleen enlarged      Crohn disease      H/O squamous cell carcinoma      Carotid artery disease      H/O right hemicolectomy  Performed in       S/P thoracotomy  removal of benign granuloma          FAMILY HISTORY:  No pertinent family history in first degree relatives    SOCIAL HISTORY:   Smoking status:  Drinking:  Drug Use:     ROS:  Constitutional: No fever, weight loss or fatigue  Eyes: No eye pain, visual disturbances, or discharge  ENMT:  No difficulty hearing, tinnitus; No sinus or throat pain  Neck: No pain or stiffness  Respiratory: No cough, wheezing, chills or hemoptysis  Cardiovascular: +shortness of breath, no chest pain, palpitations  Gastrointestinal: No abdominal pain. No nausea, vomiting  Genitourinary: No dysuria, frequency, hematuria or incontinence  Neurological: As per HPI    MEDICATIONS  (STANDING):  potassium chloride  10 mEq/100 mL IVPB 10 milliEquivalent(s) IV Intermittent once    MEDICATIONS  (PRN):    Allergies    NSAIDs (Pruritus)  Aleve (Unknown)    Intolerances      Vital Signs Last 24 Hrs  T(C): 36.4 (02 Dec 2024 23:57), Max: 36.4 (02 Dec 2024 23:57)  T(F): 97.6 (02 Dec 2024 23:57), Max: 97.6 (02 Dec 2024 23:57)  HR: 106 (03 Dec 2024 02:14) (106 - 120)  BP: 149/92 (03 Dec 2024 02:14) (147/100 - 200/127)  BP(mean): --  RR: 24 (03 Dec 2024 02:14) (24 - 24)  SpO2: 98% (03 Dec 2024 02:14) (98% - 99%)    Parameters below as of 03 Dec 2024 02:14  Patient On (Oxygen Delivery Method): BiPAP/CPAP    Physical exam:    Neurologic:  -Mental status: Awake, alert, oriented to person, not to place or year. Speech is fluent with intact naming, repetition, and comprehension, moderate dysarthria. Follows commands. Attention/concentration intact. Fund of knowledge appropriate.  -Cranial nerves:   II: Visual fields are full to confrontation.  III, IV, VI: Extraocular movements are intact without nystagmus. Pupils equally round and reactive to light  V:  Facial sensation V1-V3 equal and intact   VII: Face is symmetric with normal eye closure and smile  XII: Tongue protrudes midline  Motor: Normal bulk and tone. No pronator drift. Strength bilateral upper extremity 5/5, bilateral lower extremities 5/5.  Sensation: Intact to light touch bilaterally. No neglect or extinction on double simultaneous testing.  Coordination: LUE/LLE dysmetria.    NIHSS: 4    Fingerstick Blood Glucose: CAPILLARY BLOOD GLUCOSE  209 (03 Dec 2024 01:21)      POCT Blood Glucose.: 209 mg/dL (02 Dec 2024 23:56)    LABS:                        11.8   12.57 )-----------( 161      ( 03 Dec 2024 00:02 )             35.6     12-03    118[LL]  |  81[L]  |  24[H]  ----------------------------<  168[H]  2.9[LL]   |  25  |  2.11[H]    Ca    8.4      03 Dec 2024 01:06  Mg     1.3     12-03    TPro  5.8[L]  /  Alb  3.1[L]  /  TBili  0.6  /  DBili  x   /  AST  23  /  ALT  12  /  AlkPhos  148[H]  12-03    PT/INR - ( 03 Dec 2024 00:02 )   PT: 13.3 sec;   INR: 1.14          PTT - ( 03 Dec 2024 00:02 )  PTT:33.5 sec      Urinalysis Basic - ( 03 Dec 2024 01:53 )    Color: Yellow / Appearance: Clear / S.016 / pH: x  Gluc: x / Ketone: Negative mg/dL  / Bili: Negative / Urobili: 0.2 mg/dL   Blood: x / Protein: >=1000 mg/dL / Nitrite: Negative   Leuk Esterase: Negative / RBC: 26 /HPF / WBC 2 /HPF   Sq Epi: x / Non Sq Epi: 0 /HPF / Bacteria: Negative /HPF    RADIOLOGY & ADDITIONAL STUDIES:    < from: CT Brain Stroke Protocol (24 @ 00:44) >  IMPRESSION:  No acute transcortical infarct or hemorrhage.    < end of copied text >      < from: CT Brain Perfusion Maps Stroke (24 @ 00:45) >  CT PERFUSION:  Essentially nondiagnostic examination due to patient motion.    < end of copied text >    < from: CT Angio Brain Stroke Protocol  w/ IV Cont (24 @ 00:45) >  CTA NECK:  1.  No evidence of hemodynamically significant stenosis or occlusion.  2.  Calcified and noncalcified plaques contribute to less than 50%   stenosis of the bilateral proximal internal carotid arteries.  3.  3.5 cm right apical thick-walled cavitation and additional lung   findings as above. Recommend follow-up.    CTA HEAD:  No large vessel occlusion, significant stenosis or vascular abnormality   identified.    < end of copied text >      -----------------------------------------------------------------------------------------------------------------  IV-tenecteplase (Y/N):  N                              Bolus time:    Tenecteplase Dose Verification w/ RN:  Reason IV-tenecteplase not given: head trauma, low concern for stroke

## 2024-12-03 NOTE — PATIENT PROFILE ADULT - FALL HARM RISK - HARM RISK INTERVENTIONS

## 2024-12-03 NOTE — SWALLOW BEDSIDE ASSESSMENT ADULT - SWALLOW EVAL: DIAGNOSIS
Pt presents with suspected pharyngeal dysphagia given overt s/s of aspiration with limited PO trials. Collateral re baseline diet would be beneficial, however suspect swallow function is impeded by AMS and generalized weakness. Improvement dependent on overarching medical gains. PO diet premature at this time. Will follow up in 24-48 hours to reassess candidacy for a PO diet vs need for additional testing.

## 2024-12-03 NOTE — PROGRESS NOTE ADULT - NS ATTEND AMEND GEN_ALL_CORE FT
The patient is a 79 year old male s/p liver transplant, autonomic NS dysfunction, MAC, and Crohn’s disease admitted after fall with head trauma after which he was altered with ?garbled speech, ?  facial droop in s/o severe hyponatremia/hypokalemia. HCT, CTA/P negative apart rom L>R ICA carotid plaque w/o significant stenosis.  Given focal findings on effort-limited exam, rec MRI brain. Carotid US. ASA, statin for carotid disease.

## 2024-12-03 NOTE — CONSULT NOTE ADULT - ASSESSMENT
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 Admitted for HTN emerg following fall and head trauma. Pulmonary consulted for abnormal CT.    Data Review:  - CT chest with bilateral UL cavities, centrilobular and para-septal emphysema, interlobular septal thickening, bronchial wall thickening  - leukocytosis improved  - Eos 0.03 on admission  - afebrile with stable SpO2 on 2LNC  - VBG with pH 7.44 and PCO2 37  - elevated troponin and BNP    #M. abscessus cavitary lesions  #Severe centrilobular emphysema  #COPD  #Cryptogenic cirrhosis s/p transplant    Recommendations   - will reviewed outpatient records that were faxed over; per report CT appears similar to recent CT done at [facility], however awaiting disc to be uploaded to compare  - reasonable to continue Zosyn at this time; would discontinue azithromycin (mary anne. given M. 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 Admitted for HTN emerg following fall and head trauma. Pulmonary consulted for abnormal CT.    Data Review:  - CT chest with bilateral UL cavities, centrilobular and para-septal emphysema, interlobular septal thickening, bronchial wall thickening  - leukocytosis improved  - Eos 0.03 on admission  - afebrile with stable SpO2 on 2LNC  - VBG with pH 7.44 and PCO2 37  - elevated troponin and BNP    #M. abscessus cavitary lesions  #Severe centrilobular emphysema  #COPD  #Cryptogenic cirrhosis s/p transplant    Recommendations   - will reviewed outpatient records that were faxed over; per report CT appears similar to recent CT done at [facility], however awaiting disc to be uploaded to compare  - reasonable to continue Zosyn at this time; would discontinue azithromycin (mary anne. given M. abscessus and NADIR history)  -hypersal 3% Q12 and duoenb Q6 for managed airway clearance  - continue triple therapy with advair 100/50 and spiriva   - DVT PPX  - OOBTC and able  - Aerobika 2 times daily (perform after 3% and DuoNebs)  - obtain alpha 1 anti-trypsin level      Pulm will follow, discussed with Dr Gomez

## 2024-12-03 NOTE — CONSULT NOTE ADULT - SUBJECTIVE AND OBJECTIVE BOX
PULMONARY SERVICE INITIAL CONSULT NOTE    HPI:  WANDER VAZQUEZ is a 79y year old Male with a PMHx significant for hepatitis C s/p liver transplant in 3/2019 for cryptogenic cirrhosis (on sirolimus for 5 years), unspecified autonomic nervous system disorder w/ hx of orthostasis, COPD, Mycobacterium avium complex lung disease, chronic anemia, amaurosis fugax, Crohn's disease, BPH, and unspecified pancreatic insufficiency. Patient presented to the ED on 12/2 after having fallen at home with a head-strike, without loss of consciousness. Most of history is taken from patient's wife, who joined patient at bedside in ED. Patient's wife was in the shower and patient's wife heard a thud, saw him down on the ground and patient was altered. On presentation his blood pressure was 200/127 without vision changes, or chest pain (patient allegedly had BP of 240/140 when initially found by EMS). Of note, the patient's wife had a recent URI for which the wife suspects the patient may have started developing symptoms 1 week ago. Patient was altered in the ED.     Of note baseline labs for patient retrieved in early November were ALT 9, AST 16, Bilirubin 0.4, direct 0.2, Cr 2.74, Albumin 3.2, K 4, Na 139, GGT 18, Mg 1.4, Phos 3.6.    ED COURSE  Vitals: T(C): 36.4 (12-02-24 @ 23:57), Max: 36.4 (12-02-24 @ 23:57); HR: 106 (12-03-24 @ 02:14) (106 - 120); BP: 149/92 (12-03-24 @ 02:14) (147/100 - 200/127); RR: 24 (12-03-24 @ 02:14) (24 - 24); SpO2: 98% (12-03-24 @ 02:14) (98% - 99%)  Labs:   EKG: EKG - normal axis, sinus tachycardia w/ premature supraventricular complexes; no ST elevations or depressions  Imaging: CT cervical spine --> no acute pathologies; CT chest --> small left pleural effusion, multiple cavitary and soft tissue mass lesions in the bilateral lungs, severe centrilobar emphysema; CT Perfusion --> no large vessel occlusions  Interventions:   Consults: MICU, Neuro ICU, CCU; stroke code called    INTERIM SUBJECTIVE:  WANDER VAZQUEZ is doing okay this early morning. Has no acute complaints. He is alert and oriented. Denies fever, chills, headache, chest pain. (03 Dec 2024 03:31)      Pulmonary is consulted for abnormal CT    Follows with Dr. Daugherty ([phone]) and Dr Young outpatient for Pulmonary at [facility]. He has a complicated pulmonary history of heavy tobacco use (+1 PPD 50y, quit 10-12y ago) with severe emphysema and Mycobacterium abscessus infection previously on long-term abx for 4 months but was discontinued and currently gets sporadic regimens of Augmentin and Kefflex per his wife - unclear as to why long-term abx were discontinued other than "he did not tolerate them". Has recent CT from this month at [facility]. He last received abx 2 weeks ago (Augmentin). Has significant travel history - [place], [place], [place] and [place] +20 years ago, as well as [place] 10 years ago, and spends most of the year in [place]. Outpatient COPD regimen includes triple therapy with advair and spiriva.    REVIEW OF SYSTEMS:  10 point ROS negative aside from what is mentioned in HPI    PAST MEDICAL & SURGICAL HISTORY:  Chronic obstructive bronchitis      Other ascites      Cirrhosis      Spleen enlarged      Crohn disease      H/O squamous cell carcinoma      Carotid artery disease      H/O right hemicolectomy  Performed in 1976      S/P thoracotomy  removal of benign granuloma          FAMILY HISTORY:  No pertinent family history in first degree relatives        SOCIAL HISTORY:  Smoking Status: forme r+1PPD for 50y quit 10-12 years ago    MEDICATIONS:  Pulmonary:  albuterol/ipratropium for Nebulization 3 milliLiter(s) Nebulizer every 6 hours  fluticasone propionate/ salmeterol 100-50 MICROgram(s) Diskus 1 Dose(s) Inhalation two times a day  sodium chloride 3%  Inhalation 4 milliLiter(s) Inhalation every 12 hours    Antimicrobials:    Anticoagulants:  aspirin  chewable 81 milliGRAM(s) Oral daily  heparin   Injectable 5000 Unit(s) SubCutaneous every 8 hours    Onc:    GI/:  famotidine    Tablet 20 milliGRAM(s) Oral every 48 hours  loperamide Liquid 2 milliGRAM(s) Oral every 8 hours    Endocrine:  atorvastatin 80 milliGRAM(s) Oral at bedtime  finasteride 5 milliGRAM(s) Oral daily    Cardiac:    Other Medications:  naphazoline/pheniramine Solution 2 Drop(s) Both EYES two times a day  ondansetron    Tablet 4 milliGRAM(s) Oral every 8 hours PRN  potassium chloride  10 mEq/100 mL IVPB 10 milliEquivalent(s) IV Intermittent every 1 hour  sirolimus 2 milliGRAM(s) Oral every 24 hours  sodium chloride 3%. 500 milliLiter(s) IV Continuous <Continuous>      Allergies    NSAIDs (Pruritus)  Aleve (Unknown)    Intolerances        Vital Signs Last 24 Hrs  T(C): 36.3 (03 Dec 2024 17:37), Max: 36.5 (03 Dec 2024 05:42)  T(F): 97.3 (03 Dec 2024 17:37), Max: 97.7 (03 Dec 2024 05:42)  HR: 74 (03 Dec 2024 16:30) (66 - 120)  BP: 152/86 (03 Dec 2024 16:30) (119/74 - 200/127)  BP(mean): 112 (03 Dec 2024 16:30) (91 - 162)  RR: 16 (03 Dec 2024 16:30) (15 - 24)  SpO2: 97% (03 Dec 2024 16:30) (95% - 99%)    Parameters below as of 03 Dec 2024 16:30  Patient On (Oxygen Delivery Method): nasal cannula  O2 Flow (L/min): 2      12-02 @ 07:01  -  12-03 @ 07:00  --------------------------------------------------------  IN: 0 mL / OUT: 275 mL / NET: -275 mL    12-03 @ 07:01  -  12-03 @ 18:46  --------------------------------------------------------  IN: 0 mL / OUT: 900 mL / NET: -900 mL            PHYSICAL EXAM:  Constitutional: NAD, faituged and frail and cachectic  Head: NC/AT, some scattered ecchymosis  EENT: dry MM  Neck: supple   Respiratory: Lungs clear to auscultation bilaterally, no wheezes, rhonchi or rales  Cardiovascular: +S1/S2 intact, regular rhythm and rate. No murmurs appreciated  Gastrointestinal: soft  Extremities: no edema, clubbing or cyanosis  Vascular: 2+ radial pulses B/L  Neurological: awake and alert; oriented with no appreciable focal neuro defecits    LABS:      CBC Full  -  ( 03 Dec 2024 06:46 )  WBC Count : 11.32 K/uL  RBC Count : 3.47 M/uL  Hemoglobin : 10.6 g/dL  Hematocrit : 31.6 %  Platelet Count - Automated : 138 K/uL  Mean Cell Volume : 91.1 fl  Mean Cell Hemoglobin : 30.5 pg  Mean Cell Hemoglobin Concentration : 33.5 g/dL  Auto Neutrophil # : x  Auto Lymphocyte # : x  Auto Monocyte # : x  Auto Eosinophil # : x  Auto Basophil # : x  Auto Neutrophil % : x  Auto Lymphocyte % : x  Auto Monocyte % : x  Auto Eosinophil % : x  Auto Basophil % : x    12-03    119[LL]  |  83[L]  |  25[H]  ----------------------------<  122[H]  3.1[L]   |  23  |  2.19[H]    Ca    9.0      03 Dec 2024 15:18  Phos  3.9     12-03  Mg     2.6     12-03    TPro  5.9[L]  /  Alb  3.1[L]  /  TBili  0.4  /  DBili  x   /  AST  28  /  ALT  12  /  AlkPhos  139[H]  12-03    PT/INR - ( 03 Dec 2024 00:02 )   PT: 13.3 sec;   INR: 1.14          PTT - ( 03 Dec 2024 00:02 )  PTT:33.5 sec      RADIOLOGY & ADDITIONAL STUDIES:  < from: CT Chest w/ IV Cont (12.03.24 @ 00:46) >  CONTRAST/COMPLICATIONS:  IV Contrast: IV contrast documented in unlinked concurrent exam  Oral Contrast: NONE  PROCEDURE:  CT Angiography of the Chest.  Sagittal and coronal reformats were performed as well as 3D (MIP)   reconstructions.  FINDINGS:  LUNGS AND LARGE AIRWAYS: Posterior right upper lobe wedge resection   suture material Nonocclusive layering secretions in both mainstem   bronchi. Bilateral peribronchial wall thickening. Severe emphysema right   greater than left. Anterior right basilar bullae. Interlobular septal   thickening, left greater than right.  *  Right upper lobe thick-walled cavitary mass 3.0 x 2.2 cm (4:81)  *  Left upper lobe thick-walled cavitary nodule 1.4 x 1.4 cm (4:119)  *  Multiple pulmonary nodules, for example in left lower lobe 1.7 x 1.5   cm (4:159)  PLEURA: Small left pleural effusion. No pneumothorax.  VESSELS: No evidence of pulmonary embolism. Aortic and coronary artery   atherosclerotic calcification.  HEART: Heart size is normal. No pericardial effusion.  MEDIASTINUM AND MADY: Mildly enlarged left hilar lymph node 1.2 x 1.1 cm   (4:158).  CHEST WALL AND LOWER NECK: Cardiac Loop recorder in left chest wall.  VISUALIZED UPPER ABDOMEN: Nonobstructing right renal calcification.  BONES: No acute fracture.    IMPRESSION:  No evidence of pulmonary embolism.  Multiple cavitary and solid bilateral lung lesions concerning for primary   lung malignancy (largest in right upper lobe favor squamous cell). Right   upper lobe previously underwent wedge resection, correlation with prior   imaging would be helpful to establish chronicity/stability of lung   pathology. Consider tissue sampling versus PET/CT for further   characterization. Cannot exclude atypical infection including fungal   etiologies or tuberculosis.  Small left pleural effusion.  Diffuse interstitial pulmonary edema.  Nonocclusive layering secretions in both mainstem bronchi.

## 2024-12-03 NOTE — CONSULT NOTE ADULT - SUBJECTIVE AND OBJECTIVE BOX
Initial Hepatology Consult Note:     HPI:  79M with PMH of cryptogenic cirrhosis (s/p liver transplant in 3/2019, on sirolimus for 5 years), unspecified autonomic nervous system disorder w/ hx of orthostasis, COPD, Mycobacterium avium complex lung disease, chronic anemia, amaurosis fugax, Crohn's disease, BPH, and unspecified pancreatic insufficiency, who first presented for a fall at home. In the ED, afebrile, HR: 106 (12-03-24 @ 02:14) (106 - 120); BP: 149/92 (12-03-24 @ 02:14) (147/100 - 200/127); RR: 24 (12-03-24 @ 02:14) (24 - 24); SpO2: 98% (12-03-24 @ 02:14) (98% - 99. Labs from early Nov significant forL ALT 9, AST 16, Bilirubin 0.4, direct 0.2, Cr 2.74, Albumin 3.2, K 4, Na 139, GGT 18, Mg 1.4, Phos 3.6. Patient was admitted to [facility] for further work-up of possible syncopal event. Patient seen and examined at bedside with daughter present. Daughter describes that transplant was done in [place]. States that her father has had compromised renal function for a while now, and she thinks this might be one of the reasons he was placed on sirolimus. Patient with NG tube in place but overall appears comfortable. No acute complaints at this time.     Allergies    NSAIDs (Pruritus)  Aleve (Unknown)    Intolerances      Home Medications:  acitretin 10 mg oral capsule: 1 cap(s) orally 2 times a day (03 Dec 2024 04:41)  Advair  mcg-21 mcg/inh inhalation aerosol: 2 inhaled 2 times a day (03 Dec 2024 04:42)  allopurinol 100 mg oral tablet: orally once a day (03 Dec 2024 04:41)  benzonatate 100 mg oral capsule: 1 cap(s) orally as needed for  cough (03 Dec 2024 04:44)  buPROPion 150 mg/24 hours (XL) oral tablet, extended release: 1 tab(s) orally once a day (03 Dec 2024 04:44)  famotidine 20 mg oral tablet: 1 tab(s) orally 2 times a day (03 Dec 2024 04:42)  finasteride 5 mg oral tablet: 1 tab(s) orally once a day (03 Dec 2024 04:42)  fluocinolone 0.01% topical cream: Apply topically to affected area (03 Dec 2024 04:42)  hydrocortisone 2.5% topical cream: Apply topically to affected area (03 Dec 2024 04:42)  loperamide 2 mg oral capsule: 1 cap(s) orally 4 times a day (03 Dec 2024 04:42)  midodrine 2.5 mg oral tablet: 1 tab(s) orally 2 times a day (03 Dec 2024 04:42)  mupirocin 2% topical cream: Apply topically to affected area (03 Dec 2024 04:42)  NebuSal 3% inhalation solution: inhaled 2 times a day (03 Dec 2024 04:42)  ondansetron 4 mg oral tablet: 1 tab(s) orally as needed for  nausea (03 Dec 2024 04:42)  Pancrelipase:  (03 Dec 2024 04:42)  sirolimus 2 mg oral tablet: 1 tab(s) orally once a day (03 Dec 2024 04:44)  Spiriva 18 mcg inhalation capsule: 1 cap(s) inhaled 2 times a day (03 Dec 2024 04:42)  tamsulosin 0.4 mg oral capsule: 1 cap(s) orally (03 Dec 2024 04:42)  ursodiol 500 mg oral tablet: 1 tab(s) orally (03 Dec 2024 04:42)    MEDICATIONS:  MEDICATIONS  (STANDING):  azithromycin  IVPB 500 milliGRAM(s) IV Intermittent every 24 hours  famotidine    Tablet 20 milliGRAM(s) Oral every 48 hours  finasteride 5 milliGRAM(s) Oral daily  fluticasone propionate/ salmeterol 100-50 MICROgram(s) Diskus 1 Dose(s) Inhalation two times a day  heparin   Injectable 5000 Unit(s) SubCutaneous every 8 hours  loperamide Liquid 2 milliGRAM(s) Oral every 8 hours  piperacillin/tazobactam IVPB.- 4.5 Gram(s) IV Intermittent once  sirolimus 2 milliGRAM(s) Oral every 24 hours  sodium chloride 3%  Inhalation 4 milliLiter(s) Inhalation every 12 hours    MEDICATIONS  (PRN):  ondansetron    Tablet 4 milliGRAM(s) Oral every 8 hours PRN for nausea    PAST MEDICAL & SURGICAL HISTORY:  Chronic obstructive bronchitis      Other ascites      Cirrhosis      Spleen enlarged      Crohn disease      H/O squamous cell carcinoma      Carotid artery disease      H/O right hemicolectomy  Performed in 1976      S/P thoracotomy  removal of benign granuloma        FAMILY HISTORY:  No pertinent family history in first degree relatives      SOCIAL HISTORY:  [place]: [ ] Current, [ ] Former, [ ] Never; [employer] Years:  Alcohol:  Illicit Drugs:      Vital Signs Last 24 Hrs  T(C): 36 (03 Dec 2024 14:58), Max: 36.5 (03 Dec 2024 05:42)  T(F): 96.8 (03 Dec 2024 14:58), Max: 97.7 (03 Dec 2024 05:42)  HR: 70 (03 Dec 2024 11:44) (66 - 120)  BP: 119/74 (03 Dec 2024 11:44) (119/74 - 200/127)  BP(mean): 91 (03 Dec 2024 11:44) (91 - 162)  RR: 16 (03 Dec 2024 11:44) (15 - 24)  SpO2: 99% (03 Dec 2024 11:44) (95% - 99%)    Parameters below as of 03 Dec 2024 11:44  Patient On (Oxygen Delivery Method): nasal cannula  O2 Flow (L/min): 2      12-02 @ 07:01  -  12-03 @ 07:00  --------------------------------------------------------  IN: 0 mL / OUT: 275 mL / NET: -275 mL    12-03 @ 07:01  -  12-03 @ 15:49  --------------------------------------------------------  IN: 0 mL / OUT: 900 mL / NET: -900 mL      PHYSICAL EXAM:  General: No acute distress, +thin, frail, elderly   Lungs: Normal respiratory effort and no intercostal retractions  Cardiovascular: RRR  Abdomen: Soft, non-tender, non-distended, +NG tube in palce   Neurological: Alert and oriented x3  Skin: Warm and dry. No obvious rash      LABS:                        10.6   11.32 )-----------( 138      ( 03 Dec 2024 06:46 )             31.6     12-03    121[L]  |  85[L]  |  25[H]  ----------------------------<  125[H]  2.8[LL]   |  23  |  2.17[H]    Ca    8.6      03 Dec 2024 06:46  Phos  3.6     12-03  Mg     1.4     12-03    TPro  5.9[L]  /  Alb  3.1[L]  /  TBili  0.4  /  DBili  x   /  AST  28  /  ALT  12  /  AlkPhos  139[H]  12-03        PT/INR - ( 03 Dec 2024 00:02 )   PT: 13.3 sec;   INR: 1.14          PTT - ( 03 Dec 2024 00:02 )  PTT:33.5 sec    Urinalysis with Rflx Culture (collected 03 Dec 2024 01:53)      RADIOLOGY & ADDITIONAL STUDIES:     Reviewed

## 2024-12-03 NOTE — CONSULT NOTE ADULT - SUBJECTIVE AND OBJECTIVE BOX
Patient is a 79y old  Male who presents with a chief complaint of hypertensive urgency (03 Dec 2024 05:50)    Consult reason: hypertensive urgency, tachypnea    HPI:  79y year old Male with a PMHx significant for hepatitis C s/p liver transplant in 3/2019 for cryptogenic cirrhosis (on sirolimus for 5 years), unspecified autonomic nervous system disorder w/ hx of orthostasis, COPD, Mycobacterium avium complex lung disease, chronic anemia, amaurosis fugax, Crohn's disease, BPH, and unspecified pancreatic insufficiency. Patient presented to the ED on 12/2 after having fallen at home with a head-strike, without loss of consciousness. Most of history is taken from patient's wife, who joined patient at bedside in ED. Patient's wife was in the shower and patient's wife heard a thud, saw him down on the ground and patient was altered. On presentation his blood pressure was 200/127 without vision changes, or chest pain (patient allegedly had BP of 240/140 when initially found by EMS). Of note, the patient's wife had a recent URI for which the wife suspects the patient may have started developing symptoms 1 week ago. Patient was altered in the ED.     Of note baseline labs for patient retrieved in early November were ALT 9, AST 16, Bilirubin 0.4, direct 0.2, Cr 2.74, Albumin 3.2, K 4, Na 139, GGT 18, Mg 1.4, Phos 3.6.    ED COURSE  Vitals: T(C): 36.4 (12-02-24 @ 23:57), Max: 36.4 (12-02-24 @ 23:57); HR: 106 (12-03-24 @ 02:14) (106 - 120); BP: 149/92 (12-03-24 @ 02:14) (147/100 - 200/127); RR: 24 (12-03-24 @ 02:14) (24 - 24); SpO2: 98% (12-03-24 @ 02:14) (98% - 99%)  Labs:   EKG: EKG - normal axis, sinus tachycardia w/ premature supraventricular complexes; no ST elevations or depressions  Imaging: CT cervical spine --> no acute pathologies; CT chest --> small left pleural effusion, multiple cavitary and soft tissue mass lesions in the bilateral lungs, severe centrilobar emphysema; CT Perfusion --> no large vessel occlusions  Interventions:   Consults: MICU, Neuro ICU, CCU; stroke code called    Allergies    NSAIDs (Pruritus)  Aleve (Unknown)    Intolerances      Home Medications:  acitretin 10 mg oral capsule: 1 cap(s) orally 2 times a day (03 Dec 2024 04:41)  Advair  mcg-21 mcg/inh inhalation aerosol: 2 inhaled 2 times a day (03 Dec 2024 04:42)  allopurinol 100 mg oral tablet: orally once a day (03 Dec 2024 04:41)  benzonatate 100 mg oral capsule: 1 cap(s) orally as needed for  cough (03 Dec 2024 04:44)  buPROPion 150 mg/24 hours (XL) oral tablet, extended release: 1 tab(s) orally once a day (03 Dec 2024 04:44)  famotidine 20 mg oral tablet: 1 tab(s) orally 2 times a day (03 Dec 2024 04:42)  finasteride 5 mg oral tablet: 1 tab(s) orally once a day (03 Dec 2024 04:42)  fluocinolone 0.01% topical cream: Apply topically to affected area (03 Dec 2024 04:42)  hydrocortisone 2.5% topical cream: Apply topically to affected area (03 Dec 2024 04:42)  loperamide 2 mg oral capsule: 1 cap(s) orally 4 times a day (03 Dec 2024 04:42)  midodrine 2.5 mg oral tablet: 1 tab(s) orally 2 times a day (03 Dec 2024 04:42)  mupirocin 2% topical cream: Apply topically to affected area (03 Dec 2024 04:42)  NebuSal 3% inhalation solution: inhaled 2 times a day (03 Dec 2024 04:42)  ondansetron 4 mg oral tablet: 1 tab(s) orally as needed for  nausea (03 Dec 2024 04:42)  Pancrelipase:  (03 Dec 2024 04:42)  sirolimus 2 mg oral tablet: 1 tab(s) orally once a day (03 Dec 2024 04:44)  Spiriva 18 mcg inhalation capsule: 1 cap(s) inhaled 2 times a day (03 Dec 2024 04:42)  tamsulosin 0.4 mg oral capsule: 1 cap(s) orally (03 Dec 2024 04:42)  ursodiol 500 mg oral tablet: 1 tab(s) orally (03 Dec 2024 04:42)      PAST MEDICAL & SURGICAL HISTORY:  Chronic obstructive bronchitis      Other ascites      Cirrhosis      Spleen enlarged      Crohn disease      H/O squamous cell carcinoma      Carotid artery disease      H/O right hemicolectomy  Performed in 1976      S/P thoracotomy  removal of benign granuloma          FAMILY HISTORY:  No pertinent family history in first degree relatives    :    No known cardiovascular or pulmonary family history     ROS:  See HPI     PHYSICAL EXAM    ICU Vital Signs Last 24 Hrs  T(C): 36.5 (03 Dec 2024 05:42), Max: 36.5 (03 Dec 2024 05:42)  T(F): 97.7 (03 Dec 2024 05:42), Max: 97.7 (03 Dec 2024 05:42)  HR: 100 (03 Dec 2024 06:05) (100 - 120)  BP: 175/105 (03 Dec 2024 06:05) (147/100 - 200/127)  BP(mean): 130 (03 Dec 2024 06:05) (116 - 130)  ABP: --  ABP(mean): --  RR: 18 (03 Dec 2024 06:05) (18 - 24)  SpO2: 95% (03 Dec 2024 06:05) (95% - 99%)    O2 Parameters below as of 03 Dec 2024 06:05  Patient On (Oxygen Delivery Method): nasal cannula  O2 Flow (L/min): 2      General: NAD, on BiPAP  HEENT:  FABIANA              Lungs: CTA B/L  Cardiovascular: tachycardic rate, RR   Gastrointestinal: Soft, Positive BS, non-tender  Musculoskeletal: No clubbing.  Moves all extremities.    Skin: +left facial wound with erythema and bruising to left cheekbone  Neurological: patient responding to commands and moving all extremities      12-02-24 @ 07:01  -  12-03-24 @ 07:00  --------------------------------------------------------  IN:  Total IN: 0 mL    OUT:    Voided (mL): 275 mL  Total OUT: 275 mL    Total NET: -275 mL          LABS:                          10.6   11.32 )-----------( 138      ( 03 Dec 2024 06:46 )             31.6                                               12-03    121[L]  |  85[L]  |  25[H]  ----------------------------<  125[H]  2.8[LL]   |  23  |  2.17[H]    Ca    8.6      03 Dec 2024 06:46  Mg     1.3     12-03    TPro  5.9[L]  /  Alb  3.1[L]  /  TBili  0.4  /  DBili  x   /  AST  28  /  ALT  12  /  AlkPhos  139[H]  12-03      PT/INR - ( 03 Dec 2024 00:02 )   PT: 13.3 sec;   INR: 1.14          PTT - ( 03 Dec 2024 00:02 )  PTT:33.5 sec                                       Urinalysis Basic - ( 03 Dec 2024 06:46 )    Color: x / Appearance: x / SG: x / pH: x  Gluc: 125 mg/dL / Ketone: x  / Bili: x / Urobili: x   Blood: x / Protein: x / Nitrite: x   Leuk Esterase: x / RBC: x / WBC x   Sq Epi: x / Non Sq Epi: x / Bacteria: x                                                  LIVER FUNCTIONS - ( 03 Dec 2024 06:46 )  Alb: 3.1 g/dL / Pro: 5.9 g/dL / ALK PHOS: 139 U/L / ALT: 12 U/L / AST: 28 U/L / GGT: x                                                  Urinalysis with Rflx Culture (collected 03 Dec 2024 01:53)                                                       MEDICATIONS  (STANDING):  azithromycin  IVPB 500 milliGRAM(s) IV Intermittent every 24 hours  buPROPion XL (24-Hour) . 150 milliGRAM(s) Oral daily  famotidine    Tablet 20 milliGRAM(s) Oral two times a day  finasteride 5 milliGRAM(s) Oral daily  fluticasone propionate/ salmeterol 100-50 MICROgram(s) Diskus 1 Dose(s) Inhalation two times a day  heparin   Injectable 5000 Unit(s) SubCutaneous every 8 hours  loperamide 2 milliGRAM(s) Oral four times a day  piperacillin/tazobactam IVPB.- 4.5 Gram(s) IV Intermittent once  piperacillin/tazobactam IVPB.- 4.5 Gram(s) IV Intermittent once  potassium chloride  10 mEq/100 mL IVPB 10 milliEquivalent(s) IV Intermittent every 1 hour  sirolimus 2 milliGRAM(s) Oral daily  sodium chloride 3%  Inhalation 4 milliLiter(s) Inhalation <User Schedule>  sodium chloride 3%  Inhalation 4 milliLiter(s) Inhalation every 12 hours  tiotropium 2.5 MICROgram(s) Inhaler 2 Puff(s) Inhalation <User Schedule>    MEDICATIONS  (PRN):  benzonatate 100 milliGRAM(s) Oral three times a day PRN for cough  ondansetron    Tablet 4 milliGRAM(s) Oral every 8 hours PRN for nausea

## 2024-12-03 NOTE — PROGRESS NOTE ADULT - ASSESSMENT
79y Male with PMHx of COPD, CKD stage 4, Crohn's disease hx of liver transplant presents to [facility] ED for fall and hypertension. NIHSS 4, notable for LUE/LLE dysmetria. CT imaging unremarkable. Labs with metabolic derangements (hyponatremic to 118, hypokalemic to 2.9).     Impression: low concern for ischemic stroke at this time given severe hyponatremia.      79y Male with PMHx of COPD, CKD stage 4, Crohn's disease hx of liver transplant presents to [facility] ED for fall and hypertension. NIHSS 4, notable for LUE/LLE dysmetria. CT imaging unremarkable. Labs with metabolic derangements (hyponatremic to 118, hypokalemic to 2.9).     Impression: low concern for ischemic stroke at this time given severe hyponatremia.     CT head showed:  CTA/CTP with  Initial NIHSS:    1)Secondary stroke prevention  - start ASA 81mg PO daily and Plavix 75mg PO daily  - start Atorvastatin 80mg PO daily    2) Stroke risk factors  - A1C:   - LDL:   - atrial fibrillation  - HTN    3) Further management  - obtain MRI brain without  - recommend SBP goal <180  - recommend q4hr stroke neuro checks  - may need outpt neurology follow up  - provide stroke education    DVT prophylaxis   -Lovenox SQ and SCDs    Case discussed with  ****       79y Male with PMHx of COPD, CKD stage 4, Crohn's disease hx of liver transplant presents to [facility] ED for fall and hypertension. NIHSS 4, notable for LUE/LLE dysmetria. CT imaging unremarkable. Labs with metabolic derangements (hyponatremic to 118, hypokalemic to 2.9).     Impression: given focal findings on limited exam, stroke should be ruled out.     CT head showed: no acute ischemic stroke or hemorrhage   CTA/CTP: calcified and noncalcified plaques of b/l ICA (less than 50%)   inital NIHSS: 4    1)Secondary stroke prevention  - start ASA 81mg PO daily in the setting of ICAD  - start Atorvastatin 80mg PO daily in the setting of ICAD    2) Stroke risk factors  - HTN    3) Further management  - obtain MRI brain without to r/o stroke   - obtain carotid U/S for ICAD   - please obtain A1C, LDL   - recommend SBP goal <180  - recommend q1hr stroke neuro checks  - may need outpt neurology follow up  - provide stroke education    DVT prophylaxis   -heparin 5000 U SQ and SCDs    Case discussed with Dr. Romi Fernando

## 2024-12-03 NOTE — ED ADULT NURSE NOTE - OBJECTIVE STATEMENT
Pt is 79 year old male c/o fall in shower; according to pt family membner patient stood up out of bed and patient found on floor 5 minutes after wife left patient; pt was slurring words; had left abrasion on cheek and multiple skin tears on left and right arm; pt a&o x2 upon arrival ; MD and stroke team and bedside EKG in progress; pt has history of COPD; hep c liver transplant 2019; last known well 2315.

## 2024-12-03 NOTE — SWALLOW BEDSIDE ASSESSMENT ADULT - SLP PERTINENT HISTORY OF CURRENT PROBLEM
79y year old Male with a PMHx significant for hepatitis C s/p liver transplant in 3/2019 for cryptogenic cirrhosis (on sirolimus for 5 years), unspecified autonomic nervous system disorder w/ hx of orthostasis, COPD, Mycobacterium avium complex lung disease, chronic anemia, amaurosis fugax, Crohn's disease, BPH, and unspecified pancreatic insufficiency. Patient presented to the ED on 12/2 after having fallen at home with a head-strike, without loss of consciousness. Admitted for further medical management of blood pressure and hyponatremia.

## 2024-12-03 NOTE — H&P ADULT - NSHPSOCIALHISTORY_GEN_ALL_CORE
Alcohol: none  Smoking: former smoker, quit 10 years ago  Recreational Drugs: none  Living: lives at home with wife, no steps into home

## 2024-12-03 NOTE — ED PROVIDER NOTE - OBJECTIVE STATEMENT
79-year-old male history of hep C status post liver transplant on sirolimus 5 years ago CKD chronic anemia COPD here today with AMS stroke code in the setting of a hypertensive crisis.  Patient's wife was in the shower patient was heard to fall onto the ground in the living room wife rushed over patient was unresponsive EMS was called patient was found to be altered with blood pressure 240/140 tachypneic and brought to ED as a stroke notification.  Upon arrival patient was in severe respiratory distress and history was limited.

## 2024-12-03 NOTE — CONSULT NOTE ADULT - CRITICAL CARE ATTENDING COMMENT
I have reviewed patient status and discussed at length. I agree with findings and plans.  extensive differential diagnosis is as outlined above.

## 2024-12-03 NOTE — CONSULT NOTE ADULT - ASSESSMENT
79y Male with PMHx of COPD, CKD stage 4, Crohn's disease hx of liver transplant presents to [facility] ED for fall and hypertension. NIHSS 4, notable for LUE/LLE dysmetria. CT imaging unremarkable. Labs with metabolic derangements (hyponatremic to 118, hypokalemic to 2.9).     Impression: low concern for ischemic stroke at this time given severe hyponatremia.     Recommend:  - treat metabolic derangements per primary team   - recommend repeat CTH vs MRI brain if symptoms do not improve with electrolyte correction  - no further stroke workup warranted at this time.    Case discussed with Neurology Attending Dr. Dye

## 2024-12-03 NOTE — PROGRESS NOTE ADULT - ASSESSMENT
WANDER VAZQUEZ is a 79y year old Male with a PMHx significant for hepatitis C s/p liver transplant in 3/2019 for cryptogenic cirrhosis (on sirolimus for 5 years), unspecified autonomic nervous system disorder w/ hx of orthostasis, COPD, Mycobacterium avium complex lung disease, chronic anemia, amaurosis fugax, Crohn's disease, BPH, and unspecified pancreatic insufficiency. Patient presented to the ED on 12/2 after having fallen at home with a head-strike, without loss of consciousness. Found to be hyponatremic to 118 and found to have hypertensive urgency on presentation. Admitted for further medical management of blood pressure and hyponatremia.     NEURO:  #Metabolic Encephalopathy (RESOLVED)  Patient presented to the ED with AMS in the setting of hyponatremia and hypertensive urgency. AMS improved to baseline A&Ox3 upon blood pressure lowering. AMS likely 2/2 cerebral edema in the setting of hypertensive urgency.     #Concern for Stroke  Stroke called in ED for AMS and apparent facial droop. CT brain, CTA large vessel study showing no acute hemorrhages or occlusions. Low concern for stroke given improved AMS and metabolic derangements   - Stroke consulted, appreciate recs       - repeat imaging CTH or MRI if symptoms do not improve    CARDIOVASCULAR:  #Hypertensive Urgency with Encephalopathy (IMPROVING)  Patient presented to ED with blood pressure 200/127. S/p nicardipine gtt and lasix 60mg IV in ED. Blood pressure lowered by presentation to floor to 149/92. No longer encephalopathic. Elevated blood pressure could be 2/2 pain w/ head strike.   - continue to monitor blood pressure   - hold BP lowering medications for now     #Troponinemia   Patient presented with an elevated troponin to 115. Tachycardic on presentation. EKG with no ischemic changes, Bedside POCUS showing no wall motion abnormalities. Elevated troponin in the setting of demand ischemia and CKD.    - trend troponin to peak    #HFmREF  Patient with elevated BNP to 22,740. Patient wife reports hx of elevated BNP to 35k. Bedside POCUS showing an EF of ˜45%, no wall motion abnormalities, w/ dilated IVC on echo. Patient is s/p lasix 60mg IV in ED. Follows at [facility] cardiology.   - Strict Is&Os   - f/u TTE  - hold on additional diuresis for now    PULMONARY:  #COPD  #MAC  Patient with increased work of breathing on presentation in the setting of sick contact from wife who recently had URI. Started on BiPAP on initial presentation then weaned to NC. Thinks patient has had URI symptoms for 1 week. Patient on Advair, Spiriva, hypertonic saline at home. Does not use home O2. CT showing small left pleural effusion, no obvious consolidations. Increased oxygen requirement likely in the setting of URI and underlying lung pathology.   - wean O2 as tolerated for a goal O2 saturation 82-92% given COPD  - continue to monitor respiratory status    #Pulmonary Nodules  CT chest with a preliminary read showing multiple cavitary and soft tissue mass lesions in bilateral lungs.   - f/u outpatient for workup    RENAL:  #Hyponatremia  Na+ 118 on presentation. Normal sodium in early November. Serum osmolality 260, urine osmolality 283, urine sodium 57. Urine studies showing SIADH likely in the setting of URI infection.   - fluid restriction of 1L  - continue to monitor w/ Q4H BMP     #Hypokalemia  #Hypomagnesemia  Likely in the setting of poor PO intake  Continue to monitor, replete as needed    #CKD   Patient with a hx of CKD. Not currently getting dialysis. Baseline Cr 2.74 in early November.   - continue to monitor  - avoid nephrotoxic agents     GI/:  #Liver transplant  S/p liver transplant in 3/2019 for cryptogenic cirrhosis. Home sirolimus 2mg QD.   - c/w sirolimus 2mg QD    #Unspecified Pancreatic Insufficiency   #Diarrhea  Elevated Alk Phos to 148 on admission. Likely elevated in the setting of pancreatic insufficiency.   - f/u GGT   - c/w home Creon   - c/w home loperamide     #NPO   Patient did not pass bedside dysphagia overnight 12/2-12/3. Patient NPO for now.   - Will reassess in AM    HEME:  #Chronic Anemia   Hemoglobin 11.8 on presentation. Could be 2/2 anemia of chronic disease or iron deficiency given CKD.   - continue to monitor    ID:  #SIRS  #Leukocytosis  Met SIRS with tachypnea, tachycardia, and leukocytosis. Afebrile. Did have recent illness of URI. Low suspicion for bacterial infection at this time.   - Continue to monitor off antibiotics for now  - f/u RVP    ENDOCRINE:  - LISETH    PPX:  Fluids: fluid restriction of < 1L  Electrolytes: replete as needed  Nutrition: Diet, NPO:   Except Medications (12-03-24 @ 04:43) [Active]  PPI ppx: home famotidine 20mg BID  Dvt ppx: heparin 5000units subQ Q8H  Activity: fall precautions   Code Status: full code   WANDER VAZQUEZ is a 79y year old Male with a PMHx significant for hepatitis C s/p liver transplant in 3/2019 for cryptogenic cirrhosis (on sirolimus for 5 years), unspecified autonomic nervous system disorder w/ hx of orthostasis, COPD, Mycobacterium avium complex lung disease, chronic anemia, amaurosis fugax, Crohn's disease, BPH, and unspecified pancreatic insufficiency. Patient presented to the ED on 12/2 after having fallen at home with a head-strike, without loss of consciousness. Found to be hyponatremic to 118 and found to have hypertensive urgency on presentation. Admitted for further medical management of blood pressure and hyponatremia.     NEURO:  #Metabolic Encephalopathy (RESOLVED)  Patient presented to the ED with AMS in the setting of hyponatremia and hypertensive urgency. AMS improved to baseline A&Ox3 upon blood pressure lowering. AMS likely 2/2 cerebral edema in the setting of hypertensive urgency.     #Concern for Stroke  Stroke called in ED for AMS and apparent facial droop. CT brain, CTA large vessel study showing no acute hemorrhages or occlusions. Low concern for stroke given improved AMS and metabolic derangements   - Stroke consulted, appreciate recs       - repeat imaging CTH or MRI if symptoms do not improve    CARDIOVASCULAR:  #Hypertensive Urgency with Encephalopathy (IMPROVING)  Patient presented to ED with blood pressure 200/127. S/p nicardipine gtt and lasix 60mg IV in ED. Blood pressure lowered by presentation to floor to 149/92. No longer encephalopathic. Elevated blood pressure could be 2/2 pain w/ head strike.   - continue to monitor blood pressure   - hold BP lowering medications for now     #Troponinemia   Patient presented with an elevated troponin to 115. Tachycardic on presentation. EKG with no ischemic changes, Bedside POCUS showing no wall motion abnormalities. Elevated troponin in the setting of demand ischemia and CKD.    - trend troponin to peak    #HFmREF  Patient with elevated BNP to 22,740. Patient wife reports hx of elevated BNP to 35k. Bedside POCUS showing an EF of ˜45%, no wall motion abnormalities, w/ dilated IVC on echo. Patient is s/p lasix 60mg IV in ED. Follows at [facility] cardiology.   - Strict Is&Os   - f/u TTE  - hold on additional diuresis for now    PULMONARY:  #COPD  #MAC  Patient with increased work of breathing on presentation in the setting of sick contact from wife who recently had URI. Started on BiPAP on initial presentation then weaned to NC. Thinks patient has had URI symptoms for 1 week. Patient on Advair, Spiriva, hypertonic saline at home. Does not use home O2. CT showing small left pleural effusion, no obvious consolidations. Increased oxygen requirement likely in the setting of URI and underlying lung pathology.   - wean O2 as tolerated for a goal O2 saturation 82-92% given COPD  - continue to monitor respiratory status    #Pulmonary Nodules  CT chest with a preliminary read showing multiple cavitary and soft tissue mass lesions in bilateral lungs.   - f/u outpatient for workup    RENAL:  #Hyponatremia  Na+ 118 on presentation. Normal sodium in early November. Serum osmolality 260, urine osmolality 283, urine sodium 57. Urine studies showing SIADH likely in the setting of URI infection.   - fluid restriction of 1L  - continue to monitor w/ Q4H BMP     #Hypokalemia  #Hypomagnesemia  Likely in the setting of poor PO intake  Continue to monitor, replete as needed    #CKD   Patient with a hx of CKD. Not currently getting dialysis. Baseline Cr 2.74 in early November.   - continue to monitor  - avoid nephrotoxic agents     GI/:  #Liver transplant  S/p liver transplant in 3/2019 for cryptogenic cirrhosis. Home sirolimus 2mg QD.   - c/w sirolimus 2mg QD    #Unspecified Pancreatic Insufficiency   #Diarrhea  Elevated Alk Phos to 148 on admission. Likely elevated in the setting of pancreatic insufficiency.   - f/u GGT   - c/w home Creon   - c/w home loperamide     #NPO   Patient did not pass bedside dysphagia overnight 12/2-12/3. Patient NPO for now.   - Will reassess in AM    HEME:  #Chronic Anemia   Hemoglobin 11.8 on presentation. Could be 2/2 anemia of chronic disease or iron deficiency given CKD.   - continue to monitor    ID:  #SIRS  #Leukocytosis  Met SIRS with tachypnea, tachycardia, and leukocytosis. Afebrile. Did have recent illness of URI. Low suspicion for bacterial infection at this time.   - Continue to monitor off antibiotics for now  - f/u RVP    ENDOCRINE:  - LISETH    PPX:  Fluids: fluid restriction of < 1L  Electrolytes: replete as needed  Nutrition: Diet, NPO:   Except Medications (12-03-24 @ 04:43) [Active]  PPI ppx: home famotidine 20mg BID  Dvt ppx: heparin 5000units subQ Q8H  Activity: fall precautions   Code Status: full code   WANDER VAZQUEZ is a 79y year old Male with a PMHx significant for hepatitis C s/p liver transplant in 3/2019 for cryptogenic cirrhosis (on sirolimus for 5 years), unspecified autonomic nervous system disorder w/ hx of orthostasis, COPD, Mycobacterium avium complex lung disease, chronic anemia, amaurosis fugax, Crohn's disease, BPH, and unspecified pancreatic insufficiency. Patient presented to the ED on 12/2 after having fallen at home with a head-strike, without loss of consciousness. Found to be hyponatremic to 118 and found to have hypertensive urgency on presentation. Admitted for further medical management of blood pressure and hyponatremia.     NEURO:  #Metabolic Encephalopathy (RESOLVED)  Patient presented to the ED with AMS in the setting of hyponatremia and hypertensive urgency (-240). AMS improved to baseline A&Ox3 w/blood pressure control. AMS likely 2/2 cerebral edema in the setting of hypertensive urgency.     #Concern for Stroke  Stroke called in ED for AMS and apparent facial droop. Pt states he felt dizzy prior to him falling in the shower.   NIHSS 4, notable for LUE/LLE dysmetria.   CT brain, CTA large vessel study showing no acute hemorrhages or occlusions. Low concern for stroke given improved AMS and metabolic derangements   - Stroke consulted, appreciate recs       - repeat imaging CTH or MRI if symptoms do not improve    CARDIOVASCULAR:  #Hypertensive Urgency with Encephalopathy (IMPROVING)  Patient presented to ED with blood pressure 200/127. S/p nicardipine gtt and lasix 60mg IV in ED. Blood pressure lowered by presentation to floor to 149/92. No longer encephalopathic. Elevated blood pressure could be 2/2 pain w/ head strike.   - continue to monitor blood pressure   /p IV Labetol 10mg x1, ctm BP.    #Troponinemia   Patient presented with an elevated troponin to 115. Tachycardic on presentation. EKG with no ischemic changes, Bedside POCUS showing no wall motion abnormalities. Elevated troponin in the setting of demand ischemia and CKD.    - trend troponin to peak    #HFmREF  Patient with elevated BNP to 22,740. Patient wife reports hx of elevated BNP to 35k. Bedside POCUS showing an EF of ˜45%, no wall motion abnormalities, w/ dilated IVC on echo. Patient is s/p lasix 60mg IV in ED. Follows at [facility] cardiology.   - Strict Is&Os   - f/u formal TTE  - hold on additional diuresis for now    PULMONARY:  #COPD  #MAC  Presentation: iWOB, recent sick contact: wife (URI), BiPAP ---->NC. Home meds: Advair, Spiriva, hypertonic saline. Does not use home O2.   CT showing b/l cavitary+solid lung lesion concerning for primary lung malignancy, RUL s/p wedge resection, cannot exclude TB or fungal etiology w/ small left pleural effusion and Diffuse interstitial pulmonary edema.  - wean O2 as tolerated for a goal O2 saturation 82-92% given COPD  - continue to monitor respiratory status  - Pulmonology consult to establish dx: infectious vs autoimmune vs malignancy etiology  - fu sputum culture, induced sputum culture, strep PNA, Legionella,     #Pulmonary Nodules  CT chest with a preliminary read showing multiple cavitary and soft tissue mass lesions in bilateral lungs.   - f/u outpatient for workup    RENAL:  #Hyponatremia  Na+ 118 on presentation. Normal sodium in early November. Serum osmolality 260, urine osmolality 283, urine sodium 57. Urine studies showing SIADH likely in the setting of URI infection.   - fluid restriction of 1L  - continue to monitor w/ Q4H BMP     #Hypokalemia  #Hypomagnesemia  Likely in the setting of poor PO intake  Continue to monitor, replete as needed    #CKD   Patient with a hx of CKD. Not currently getting dialysis. Baseline Cr 2.74 in early November.   - continue to monitor  - avoid nephrotoxic agents     GI/:  #Liver transplant  S/p liver transplant in 3/2019 for cryptogenic cirrhosis. Home sirolimus 2mg QD.   - c/w sirolimus 2mg QD    #Unspecified Pancreatic Insufficiency   #Diarrhea  Elevated Alk Phos to 148 on admission. Likely elevated in the setting of pancreatic insufficiency.   - f/u GGT   - c/w home Creon   - c/w home loperamide     #NPO   Patient did not pass bedside dysphagia overnight 12/2-12/3. Patient NPO for now.   - Will reassess in AM    HEME:  #Chronic Anemia   Hemoglobin 11.8 on presentation. Could be 2/2 anemia of chronic disease or iron deficiency given CKD.   - continue to monitor    ID:  #SIRS  #Leukocytosis  Met SIRS with tachypnea, tachycardia, and leukocytosis. Afebrile. Did have recent illness of URI. Low suspicion for bacterial infection at this time.   - Continue to monitor off antibiotics for now  - f/u RVP    ENDOCRINE:  - LISETH    PPX:  Fluids: fluid restriction of < 1L  Electrolytes: replete as needed  Nutrition: Diet, NPO:   Except Medications (12-03-24 @ 04:43) [Active]  PPI ppx: home famotidine 20mg BID  Dvt ppx: heparin 5000units subQ Q8H  Activity: fall precautions   Code Status: full code   WANDER VAZQUEZ is a 79y year old Male with a PMHx significant for hepatitis C s/p liver transplant in 3/2019 for cryptogenic cirrhosis (on sirolimus for 5 years), unspecified autonomic nervous system disorder w/ hx of orthostasis, COPD, Mycobacterium avium complex lung disease, chronic anemia, amaurosis fugax, Crohn's disease, BPH, and unspecified pancreatic insufficiency. Patient presented to the ED on 12/2 after having fallen at home with a head-strike, without loss of consciousness. Found to be hyponatremic to 118 and found to have hypertensive urgency on presentation. Admitted for further medical management of blood pressure and hyponatremia.     NEURO:  #Metabolic Encephalopathy (RESOLVED)  Patient presented to the ED with AMS in the setting of hyponatremia and hypertensive urgency (-240). AMS improved to baseline A&Ox3 w/blood pressure control. AMS likely 2/2 cerebral edema in the setting of hypertensive urgency.     #Concern for Stroke  Stroke called in ED for AMS and apparent facial droop. Pt states he felt dizzy prior to him falling in the shower.   NIHSS 4, notable for LUE/LLE dysmetria.   CT brain, CTA large vessel study showing no acute hemorrhages or occlusions. Low concern for stroke given improved AMS and metabolic derangements   - Stroke consulted, appreciate recs       - repeat imaging CTH or MRI if symptoms do not improve    CARDIOVASCULAR:  #Hypertensive Urgency with Encephalopathy (IMPROVING)  Patient presented to ED with blood pressure 200/127. S/p nicardipine gtt and lasix 60mg IV in ED. Blood pressure lowered by presentation to floor to 149/92. No longer encephalopathic. Elevated blood pressure could be 2/2 pain w/ head strike.   - continue to monitor blood pressure   /p IV Labetol 10mg x1, ctm BP.    #Troponinemia   Patient presented with an elevated troponin to 115. Tachycardic on presentation. EKG with no ischemic changes, Bedside POCUS showing no wall motion abnormalities. Elevated troponin in the setting of demand ischemia and CKD.    - trend troponin to peak    #HFmREF  Patient with elevated BNP to 22,740. Patient wife reports hx of elevated BNP to 35k. Bedside POCUS showing an EF of ˜45%, no wall motion abnormalities, w/ dilated IVC on echo. Patient is s/p lasix 60mg IV in ED. Follows at [facility] cardiology.   - Strict Is&Os   - f/u formal TTE  - hold on additional diuresis for now    #Hypertensive emergency  p/w fall w/ garbled speech w/ /140 in the field and 200/127 in ED, managed w/ titus drip initially and currently on labetolol IV PRN.  - Reduce BP by 25% IN 24 hrs  - ctm vitals  - fu US Abdomen w/ doppler to r/o SUKHJINDER,   - fu Plasma renin activity and aldosterone level to r/o hyperaldosteronism    PULMONARY:  #COPD  #MAC  # R/O lung malignancy  #R/O active TB  Presentation: iWOB, recent sick contact: wife (URI), BiPAP ---->NC. Home meds: Advair, Spiriva, hypertonic saline. Does not use home O2.   CT showing b/l cavitary+solid lung lesion concerning for primary lung malignancy, RUL s/p wedge resection, cannot exclude TB or fungal etiology w/ small left pleural effusion and Diffuse interstitial pulmonary edema.  - wean O2 as tolerated for a goal O2 saturation 82-92% given COPD  - continue to monitor respiratory status  - Pulmonology consult to establish dx: infectious vs autoimmune vs malignancy etiology  - fu sputum culture, induced sputum culture, strep PNA, Legionella, RSV, MRSA, Galactomannan, fungitell  - c/w Vanc+azithromycin_ zosyn 4.5g to cover NADIR  - fu pulmonology recs  - fu MRI Brain to r/o brain mets    #Pulmonary Nodules  CT chest with a preliminary read showing multiple cavitary and soft tissue mass lesions in bilateral lungs.   - f/u outpatient for workup    RENAL:  #Hyponatremia  Na+ 118 on presentation. Normal sodium in early November. Serum osmolality 260, urine osmolality 283, urine sodium 57. Hypovolemic on physical exam: dry MM and decreased skin turgor.  Etiology: Could be SIADH since Na improved from 118 to 121 w/ fluid restriction, given hypovolemic physical exam, CSW is a possibility given hypovolemic physical exam findings and p/w fall w/ headstrike.    - fluid restriction of 1L  - continue to monitor w/ Q4H BMP and q12h urine studies  - fu nephrology recs  - fu TSH and AM cortisol  - strict I and O    #Hypokalemia  #Hypomagnesemia  Likely in the setting of poor PO intake  Continue to monitor, replete as needed    #CKD   Patient with a hx of CKD. Not currently getting dialysis. Baseline Cr 2.74 in early November.   - continue to monitor  - avoid nephrotoxic agents     GI/:  #Liver transplant  S/p liver transplant in 3/2019 for cryptogenic cirrhosis. Home sirolimus 2mg QD.   - c/w sirolimus 2mg QD    #Unspecified Pancreatic Insufficiency   #Diarrhea  Elevated Alk Phos to 148 on admission. Likely elevated in the setting of pancreatic insufficiency.   - f/u GGT   - c/w home Creon   - c/w home loperamide     #NPO   Patient did not pass bedside dysphagia overnight 12/2-12/3. Patient NPO for now.   - Will reassess in AM    HEME:  #Chronic Anemia   Hemoglobin 11.8 on presentation. Could be 2/2 anemia of chronic disease or iron deficiency given CKD.   - continue to monitor    ID:  #SIRS  #Leukocytosis  Met SIRS with tachypnea, tachycardia, and leukocytosis. Afebrile. Did have recent illness of URI. Low suspicion for bacterial infection at this time.   - Continue to monitor off antibiotics for now  - f/u RVP    ENDOCRINE:  - LISETH    PPX:  Fluids: fluid restriction of < 1L  Electrolytes: replete as needed  Nutrition: Diet, NPO:   Except Medications (12-03-24 @ 04:43) [Active]  PPI ppx: home famotidine 20mg BID  Dvt ppx: heparin 5000units subQ Q8H  Activity: fall precautions   Code Status: full code   WANDER VAZQUEZ is a 79y year old Male with a PMHx significant for hepatitis C s/p liver transplant in 3/2019 for cryptogenic cirrhosis (on sirolimus for 5 years), unspecified autonomic nervous system disorder w/ hx of orthostasis, COPD, Mycobacterium avium complex lung disease, chronic anemia, amaurosis fugax, Crohn's disease, BPH, and unspecified pancreatic insufficiency. Patient presented to the ED on 12/2 after having fallen at home with a head-strike, without loss of consciousness. Found to be hyponatremic to 118 and found to have hypertensive urgency on presentation. Admitted for further medical management of blood pressure and hyponatremia.     NEURO:  #Metabolic Encephalopathy (RESOLVED)  Patient presented to the ED with AMS in the setting of hyponatremia and hypertensive urgency (-240). AMS improved to baseline A&Ox3 w/blood pressure control. AMS likely 2/2 cerebral edema in the setting of hypertensive urgency.     #Concern for Stroke  Stroke called in ED for AMS and apparent facial droop. Pt states he felt dizzy prior to him falling in the shower.   NIHSS 4, notable for LUE/LLE dysmetria.   CT brain, CTA large vessel study showing no acute hemorrhages or occlusions. Low concern for stroke given improved AMS and metabolic derangements   - Stroke consulted, appreciate recs       - repeat imaging CTH or MRI if symptoms do not improve    CARDIOVASCULAR:  #Hypertensive Urgency with Encephalopathy (IMPROVING)  Patient presented to ED with blood pressure 200/127. S/p nicardipine gtt and lasix 60mg IV in ED. Blood pressure lowered by presentation to floor to 149/92. No longer encephalopathic. Elevated blood pressure could be 2/2 pain w/ head strike.   - continue to monitor blood pressure   /p IV Labetol 10mg x1, ctm BP.    #Troponinemia   Patient presented with an elevated troponin to 115. Tachycardic on presentation. EKG with no ischemic changes, Bedside POCUS showing no wall motion abnormalities. Elevated troponin in the setting of demand ischemia and CKD.    - trend troponin to peak    #HFmREF  Patient with elevated BNP to 22,740. Patient wife reports hx of elevated BNP to 35k. Bedside POCUS showing an EF of ˜45%, no wall motion abnormalities, w/ dilated IVC on echo. Patient is s/p lasix 60mg IV in ED. Follows at [facility] cardiology.   - Strict Is&Os   - f/u formal TTE  - hold on additional diuresis for now    #Hypertensive emergency  p/w fall w/ garbled speech w/ /140 in the field and 200/127 in ED, managed w/ titus drip initially and currently on labetolol IV PRN.  - Reduce BP by 25% IN 24 hrs  - ctm vitals  - fu US Abdomen w/ doppler to r/o SUKHJINDER,   - fu Plasma renin activity and aldosterone level to r/o hyperaldosteronism    PULMONARY:  #COPD  #MAC  # R/O lung malignancy  #R/O active TB  Presentation: iWOB, recent sick contact: wife (URI), BiPAP ---->NC. Home meds: Advair, Spiriva, hypertonic saline. Does not use home O2.   CT showing b/l cavitary+solid lung lesion concerning for primary lung malignancy, RUL s/p wedge resection, cannot exclude TB or fungal etiology w/ small left pleural effusion and Diffuse interstitial pulmonary edema.  - wean O2 as tolerated for a goal O2 saturation 82-92% given COPD  - continue to monitor respiratory status  - Pulmonology consult to establish dx: infectious vs autoimmune vs malignancy etiology  - fu sputum culture, induced sputum culture, strep PNA, Legionella, RSV, MRSA, Galactomannan, fungitell  - fu ACE  - c/w Vanc+azithromycin_ zosyn 4.5g to cover NADIR  - fu pulmonology recs  - fu MRI Brain to r/o brain mets    #Pulmonary Nodules  CT chest with a preliminary read showing multiple cavitary and soft tissue mass lesions in bilateral lungs.   - f/u outpatient for workup    RENAL:  #Hyponatremia  Na+ 118 on presentation. Normal sodium in early November. Serum osmolality 260, urine osmolality 283, urine sodium 57. Hypovolemic on physical exam: dry MM and decreased skin turgor.  Etiology: Could be SIADH since Na improved from 118 to 121 w/ fluid restriction, given hypovolemic physical exam, CSW is a possibility given hypovolemic physical exam findings and p/w fall w/ headstrike.    - fluid restriction of 1L  - continue to monitor w/ Q4H BMP and q12h urine studies  - fu nephrology recs  - fu TSH and AM cortisol  - strict I and O    #Hypokalemia  #Hypomagnesemia  Likely in the setting of poor PO intake  Continue to monitor, replete as needed    #CKD   Patient with a hx of CKD, Hep C, Liver tx. Not currently getting dialysis. Baseline Cr 2.74 in early November.   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 Home sirolimus 2mg QD.   - c/w sirolimus 2mg QD    #Unspecified Pancreatic Insufficiency   #Diarrhea  Elevated Alk Phos to 148 on admission. Likely elevated in the setting of pancreatic insufficiency.   - f/u GGT   - c/w home Creon   - c/w home loperamide     #NPO   Patient did not pass bedside dysphagia overnight 12/2-12/3. Patient NPO for now.   - Will reassess in AM    HEME:  #Chronic Anemia   Hemoglobin 11.8 on presentation. Could be 2/2 anemia of chronic disease or iron deficiency given CKD.   - continue to monitor    ID:  #SIRS  #Leukocytosis  Met SIRS with tachypnea, tachycardia, and leukocytosis. Afebrile. Did have recent illness of URI. Low suspicion for bacterial infection at this time.   - Continue to monitor off antibiotics for now  - f/u RVP    ENDOCRINE:  - LISETH    PPX:  Fluids: fluid restriction of < 1L  Electrolytes: replete as needed  Nutrition: Diet, NPO:   Except Medications (12-03-24 @ 04:43) [Active]  PPI ppx: home famotidine 20mg BID  Dvt ppx: heparin 5000units subQ Q8H  Activity: fall precautions   Code Status: full code   WANDER VAZQUEZ is a 79y year old Male with a PMHx significant for hepatitis C s/p liver transplant in 3/2019 for cryptogenic cirrhosis (on sirolimus for 5 years), unspecified autonomic nervous system disorder w/ hx of orthostasis, COPD, Mycobacterium avium complex lung disease, chronic anemia, amaurosis fugax, Crohn's disease, BPH, and unspecified pancreatic insufficiency. Patient presented to the ED on 12/2 after having fallen at home with a head-strike, without loss of consciousness. Found to be hyponatremic to 118 and found to have hypertensive urgency on presentation. Admitted for further medical management of blood pressure and hyponatremia.     NEURO:  #Metabolic Encephalopathy (RESOLVED)  Patient presented to the ED with AMS in the setting of hyponatremia and hypertensive urgency (-240). AMS improved to baseline A&Ox3 w/blood pressure control. AMS likely 2/2 cerebral edema in the setting of hypertensive urgency.     #Concern for Stroke  Stroke called in ED for AMS and apparent facial droop. Pt states he felt dizzy prior to him falling in the shower.   NIHSS 4, notable for LUE/LLE dysmetria.   CT brain, CTA large vessel study showing no acute hemorrhages or occlusions. Low concern for stroke given improved AMS and metabolic derangements   - Stroke consulted, appreciate recs       - repeat imaging CTH or MRI if symptoms do not improve    CARDIOVASCULAR:  #Hypertensive Urgency with Encephalopathy (IMPROVING)  Patient presented to ED with blood pressure 200/127. S/p nicardipine gtt and lasix 60mg IV in ED. Blood pressure lowered by presentation to floor to 149/92. No longer encephalopathic. Elevated blood pressure could be 2/2 pain w/ head strike.   - continue to monitor blood pressure   /p IV Labetol 10mg x1, ctm BP.    #Troponinemia   Patient presented with an elevated troponin to 115. Tachycardic on presentation. EKG with no ischemic changes, Bedside POCUS showing no wall motion abnormalities. Elevated troponin in the setting of demand ischemia and CKD.    - trend troponin to peak    #HFmREF  [facility] my chart accessed on daughters phone  - Myocardial SPECT-  LVEF 44%, EKG- SR, rare PVC (11/2024)  - Tc-99 PYP amyloid scan- negative for TTR amyloid (10/29/2024)  - pRO-bnp-2080 (9/22/2024)  Patient with elevated BNP to 22,740. Patient wife reports hx of elevated BNP to 35k. Bedside POCUS showing an EF of ˜45%, no wall motion abnormalities, w/ dilated IVC on echo. Patient is s/p lasix 60mg IV in ED. Follows at [facility] cardiology.     - Strict Is&Os   - f/u formal TTE  - hold on additional diuresis for now    #Hypertensive emergency  p/w fall w/ garbled speech w/ /140 in the field and 200/127 in ED, managed w/ cardine drip initially and currently on labetolol IV PRN.  - Reduce BP by 25% IN 24 hrs  - ctm vitals  - fu US Abdomen w/ doppler to r/o SUKHJINDER,   - fu Plasma renin activity and aldosterone level to r/o hyperaldosteronism    PULMONARY:  #COPD  #MAC  # R/O lung malignancy  #R/O active TB  Presentation: iWOB, recent sick contact: wife (LOUISEI), BiPAP ---->NC. Home meds: Advair, Spiriva, hypertonic saline. Does not use home O2.   CT showing b/l cavitary+solid lung lesion concerning for primary lung malignancy, RUL s/p wedge resection, cannot exclude TB or fungal etiology w/ small left pleural effusion and Diffuse interstitial pulmonary edema.  [facility] my chart accessed on daughters phone  -11/21/2024- Sputum AFB smear+, c/s Mycobacterium abscessus+ (also positive in june and july 2024)  - 07/02/2024- candida albicans+  - wean O2 as tolerated for a goal O2 saturation 82-92% given COPD  - continue to monitor respiratory status  - Pulmonology consult to establish dx: infectious vs autoimmune vs malignancy etiology  - fu sputum culture, induced sputum culture, strep PNA, Legionella, RSV, MRSA, Galactomannan, fungitell  - fu autoimune hairston to r/o GPA, sarcoid  - c/w Vanc+azithromycin_ zosyn 4.5g to cover NADIR  - fu pulmonology recs  - fu MRI Brain to r/o brain mets    #Pulmonary Nodules  CT chest with a preliminary read showing multiple cavitary and soft tissue mass lesions in bilateral lungs.   - f/u outpatient for workup    RENAL:  #Hyponatremia  Na+ 118 on presentation. Normal sodium in early November. Serum osmolality 260, urine osmolality 283, urine sodium 57. Hypovolemic on physical exam: dry MM and decreased skin turgor.  Etiology: Could be SIADH since Na improved from 118 to 121 w/ fluid restriction, given hypovolemic physical exam, CSW is a possibility given hypovolemic physical exam findings and p/w fall w/ headstrike.    - fluid restriction of 1L  - continue to monitor w/ Q4H BMP and q12h urine studies  - fu nephrology recs  - fu TSH and AM cortisol  - strict I and O    #Hypokalemia  #Hypomagnesemia  Likely in the setting of poor PO intake  Continue to monitor, replete as needed    #CKD   Patient with a hx of CKD, Hep C, Liver tx. Not currently getting dialysis. Baseline Cr 2.74 in early November.   UA- Protein>1000, RBC-26 represents a GN w/ nephritic range proteinuria    [facility] my chart accessed on daughters phone  - 11/21/2024 KFLC-272.2, LFLC- 28.4. K/L- 9.58, m-spike+, IgG-1089, iGa-211, iGm-72  - 11/21/2024  Uric acid-  6.7, LDH- 185  - 6/3/2024- Renal biopsy-                          - H&E- (1) segmental and global glomerulosclerosis, (2) diffuse global glomerulosclerosis (63%), w/ moderate to marked tubular atrophy & interstitial fibrosis w/ marked arterial and arteriolar sclerosis                         - LM - 16/24 glomeruli: globally sclerotic or show chronic hypoperfusion, 6/24: mild to moderate mesangial prominence, 2/24: segmental sclerosis    - fu GN workup: Autoimmune (LAWANDA, ANCA, C3, C4, Cryoglobulin, anti-GBM ds, dsDNA), infection (HIV, Hep B, Hep C),   - continue to monitor  - avoid nephrotoxic agents     GI/:  #Liver transplant  S/p liver transplant in 3/2019 for cryptogenic cirrhosis. Home sirolimus 2mg QD.   - c/w sirolimus 2mg QD    #Unspecified Pancreatic Insufficiency   #Diarrhea  Elevated Alk Phos to 148 on admission. Likely elevated in the setting of pancreatic insufficiency.   - f/u GGT   - c/w home Creon   - c/w home loperamide     #NPO   Patient did not pass bedside dysphagia overnight 12/2-12/3. Patient NPO for now.   - Will reassess in AM    HEME:  #Chronic Anemia   Hemoglobin 11.8 on presentation. Could be 2/2 anemia of chronic disease or iron deficiency given CKD.   - continue to monitor    ID:  #SIRS  #Leukocytosis  Met SIRS with tachypnea, tachycardia, and leukocytosis. Afebrile. Did have recent illness of URI. Low suspicion for bacterial infection at this time.   - Continue to monitor off antibiotics for now  - f/u RVP    ENDOCRINE:  - LISETH    PPX:  Fluids: fluid restriction of < 1L  Electrolytes: replete as needed  Nutrition: Diet, NPO:   Except Medications (12-03-24 @ 04:43) [Active]  PPI ppx: home famotidine 20mg BID  Dvt ppx: heparin 5000units subQ Q8H  Activity: fall precautions   Code Status: full code   WANDER VAZQUEZ is a 79y year old Male with a PMHx significant for hepatitis C s/p liver transplant in 3/2019 for cryptogenic cirrhosis (on sirolimus for 5 years), unspecified autonomic nervous system disorder w/ hx of orthostasis, COPD, Mycobacterium avium complex lung disease, chronic anemia, amaurosis fugax, Crohn's disease, BPH, and unspecified pancreatic insufficiency. Patient presented to the ED on 12/2 after having fallen at home with a head-strike, without loss of consciousness. Found to be hyponatremic to 118 and found to have hypertensive urgency on presentation. Admitted for further medical management of blood pressure and hyponatremia.     NEURO:  #Metabolic Encephalopathy (RESOLVED)  Patient presented to the ED with AMS in the setting of hyponatremia and hypertensive urgency (-240). AMS improved to baseline A&Ox3 w/blood pressure control. AMS likely 2/2 cerebral edema in the setting of hypertensive urgency.     #Concern for Stroke  Stroke called in ED for AMS and apparent facial droop. Pt states he felt dizzy prior to him falling in the shower.   NIHSS 4, notable for LUE/LLE dysmetria.   CT brain, CTA large vessel study showing no acute hemorrhages or occlusions. Low concern for stroke given improved AMS and metabolic derangements   - Stroke consulted, appreciate recs       - repeat imaging CTH or MRI if symptoms do not improve    CARDIOVASCULAR:  #Hypertensive Urgency with Encephalopathy (IMPROVING)  Patient presented to ED with blood pressure 200/127. S/p nicardipine gtt and lasix 60mg IV in ED. Blood pressure lowered by presentation to floor to 149/92. No longer encephalopathic. Elevated blood pressure could be 2/2 pain w/ head strike.   - continue to monitor blood pressure   /p IV Labetol 10mg x1, ctm BP.    #Troponinemia   Patient presented with an elevated troponin to 115. Tachycardic on presentation. EKG with no ischemic changes, Bedside POCUS showing no wall motion abnormalities. Elevated troponin in the setting of demand ischemia and CKD.    - trend troponin to peak    #HFmREF  [facility] my chart accessed on daughters phone  - Myocardial SPECT-  LVEF 44%, EKG- SR, rare PVC (11/2024)  - Tc-99 PYP amyloid scan- negative for TTR amyloid (10/29/2024)  - pRO-bnp-2080 (9/22/2024)  Patient with elevated BNP to 22,740. Patient wife reports hx of elevated BNP to 35k. Bedside POCUS showing an EF of ˜45%, no wall motion abnormalities, w/ dilated IVC on echo. Patient is s/p lasix 60mg IV in ED. Follows at [facility] cardiology.     - Strict Is&Os   - f/u formal TTE  - hold on additional diuresis for now    #Hypertensive emergency  p/w fall w/ garbled speech w/ /140 in the field and 200/127 in ED, managed w/ cardine drip initially and currently on labetolol IV PRN.  - Reduce BP by 25% IN 24 hrs  - ctm vitals  - fu US Abdomen w/ doppler to r/o SUKHJINDER,   - fu Plasma renin activity and aldosterone level to r/o hyperaldosteronism    PULMONARY:  #COPD  #MAC  # R/O lung malignancy  #R/O active TB  Presentation: iWOB, recent sick contact: wife (LOUISEI), BiPAP ---->NC. Home meds: Advair, Spiriva, hypertonic saline. Does not use home O2.   CT showing b/l cavitary+solid lung lesion concerning for primary lung malignancy, RUL s/p wedge resection, cannot exclude TB or fungal etiology w/ small left pleural effusion and Diffuse interstitial pulmonary edema.  [facility] my chart accessed on daughters phone  -11/21/2024- Sputum AFB smear+, c/s Mycobacterium abscessus+ (also positive in june and july 2024)  - 07/02/2024- candida albicans+  - wean O2 as tolerated for a goal O2 saturation 82-92% given COPD  - continue to monitor respiratory status  - Pulmonology consult to establish dx: infectious vs autoimmune vs malignancy etiology  - fu sputum culture, induced sputum culture, strep PNA, Legionella, RSV, MRSA, Galactomannan, fungitell  - fu autoimune hairston to r/o GPA, sarcoid  - c/w Vanc+azithromycin_ zosyn 4.5g to cover NADIR  - fu pulmonology recs  - fu MRI Brain to r/o brain mets    #Pulmonary Nodules  CT chest with a preliminary read showing multiple cavitary and soft tissue mass lesions in bilateral lungs.   - f/u outpatient for workup    RENAL:  #Hyponatremia  Na+ 118 on presentation. Normal sodium in early November. Serum osmolality 260, urine osmolality 283, urine sodium 57. Hypovolemic on physical exam: dry MM and decreased skin turgor.  Etiology: Could be SIADH since Na improved from 118 to 121 w/ fluid restriction, given hypovolemic physical exam, CSW is a possibility given hypovolemic physical exam findings and p/w fall w/ headstrike.    - fluid restriction of 1L  - continue to monitor w/ Q4H BMP and q12h urine studies  - fu nephrology recs  - fu TSH and AM cortisol  - strict I and O    #Hypokalemia  #Hypomagnesemia  Likely in the setting of poor PO intake  Continue to monitor, replete as needed    #CKD   Patient with a hx of CKD, Hep C, Liver tx. Not currently getting dialysis. Baseline Cr 2.74 in early November.   UA- Protein>1000, RBC-26 represents a GN w/ nephritic range proteinuria    [facility] my chart accessed on daughters phone  - 11/21/2024 KFLC-272.2, LFLC- 28.4. K/L- 9.58, m-spike+, IgG-1089, iGa-211, iGm-72  - 11/21/2024  Uric acid-  6.7, LDH- 185  - 6/3/2024- Renal biopsy-                          - H&E- (1) segmental and global glomerulosclerosis, (2) diffuse global glomerulosclerosis (63%), w/ moderate to marked tubular atrophy & interstitial fibrosis w/ marked arterial and arteriolar sclerosis                         - LM - 16/24 glomeruli: globally sclerotic or show chronic hypoperfusion, 6/24: mild to moderate mesangial prominence, 2/24: segmental sclerosis, several bhavik show segmental "double contour" formation                         - IF - 4/6 globally sclerotic, Igm, Kappa, Lambda: trace granular mesagngial staining, IgG, IgA, C1q: no stain, 1+ C3- along tubular BM and in vessels, Casts- bitypic for both light chains. albumin neg, Fibrin neg                         - EM- 1/2 segmental sclerosis w/ subepithelial reparative changes as well as globular deposits consistent w/ hyaline w/ focal foot effacement.  GBM thickness mildly increaed, Subendothelial space:  no electron lucent expansion, No dense deposits. Mesangium: small electron dense deposits. Foci of mesangial interposition. No amyloid. [id number]!      - fu GN workup: Autoimmune (LAWANDA, ANCA, C3, C4, Cryoglobulin, anti-GBM ds, dsDNA), infection (HIV, Hep B, Hep C),   - continue to monitor  - avoid nephrotoxic agents     GI/:  #Liver transplant  S/p liver transplant in 3/2019 for cryptogenic cirrhosis. Home sirolimus 2mg QD.   - c/w sirolimus 2mg QD    #Unspecified Pancreatic Insufficiency   #Diarrhea  Elevated Alk Phos to 148 on admission. Likely elevated in the setting of pancreatic insufficiency.   - f/u GGT   - c/w home Creon   - c/w home loperamide     #NPO   Patient did not pass bedside dysphagia overnight 12/2-12/3. Patient NPO for now.   - Will reassess in AM    HEME:  #Chronic Anemia   Hemoglobin 11.8 on presentation. Could be 2/2 anemia of chronic disease or iron deficiency given CKD.   - continue to monitor    ID:  #SIRS  #Leukocytosis  Met SIRS with tachypnea, tachycardia, and leukocytosis. Afebrile. Did have recent illness of URI. Low suspicion for bacterial infection at this time.   - Continue to monitor off antibiotics for now  - f/u RVP    ENDOCRINE:  - LISETH    PPX:  Fluids: fluid restriction of < 1L  Electrolytes: replete as needed  Nutrition: Diet, NPO:   Except Medications (12-03-24 @ 04:43) [Active]  PPI ppx: home famotidine 20mg BID  Dvt ppx: heparin 5000units subQ Q8H  Activity: fall precautions   Code Status: full code   WANDER VAZQUEZ is a 79y year old Male with a PMHx significant for hepatitis C s/p liver transplant in 3/2019 for cryptogenic cirrhosis (on sirolimus for 5 years), unspecified autonomic nervous system disorder w/ hx of orthostasis, COPD, Mycobacterium avium complex lung disease, chronic anemia, amaurosis fugax, Crohn's disease, BPH, and unspecified pancreatic insufficiency. Patient presented to the ED on 12/2 after having fallen at home with a head-strike, without loss of consciousness. Found to be hyponatremic to 118 and found to have hypertensive urgency on presentation. Admitted for further medical management of blood pressure and hyponatremia.     NEURO:  #Metabolic Encephalopathy (RESOLVED)  Patient presented to the ED with AMS in the setting of hyponatremia and hypertensive urgency (-240). AMS improved to baseline A&Ox3 w/blood pressure control. AMS likely 2/2 cerebral edema in the setting of hypertensive urgency.     #Concern for Stroke  Stroke called in ED for AMS and apparent facial droop. Pt states he felt dizzy prior to him falling in the shower.   NIHSS 4, notable for LUE/LLE dysmetria.   CT brain, CTA large vessel study showing no acute hemorrhages or occlusions. Low concern for stroke given improved AMS and metabolic derangements   - Stroke consulted, appreciate recs       - repeat imaging CTH or MRI if symptoms do not improve    CARDIOVASCULAR:  #Hypertensive Urgency with Encephalopathy (IMPROVING)  Patient presented to ED with blood pressure 200/127. S/p nicardipine gtt and lasix 60mg IV in ED. Blood pressure lowered by presentation to floor to 149/92. No longer encephalopathic. Elevated blood pressure could be 2/2 pain w/ head strike.   - continue to monitor blood pressure   /p IV Labetol 10mg x1, ctm BP.    #Troponinemia   Patient presented with an elevated troponin to 115. Tachycardic on presentation. EKG with no ischemic changes, Bedside POCUS showing no wall motion abnormalities. Elevated troponin in the setting of demand ischemia and CKD.    - trend troponin to peak    #HFmREF  [facility] my chart accessed on daughters phone  - Myocardial SPECT-  LVEF 44%, EKG- SR, rare PVC (11/2024)  - Tc-99 PYP amyloid scan- negative for TTR amyloid (10/29/2024)  - US Heart- small pericardial effusion (9/5/2024)  - pRO-bnp-2080 (9/22/2024)  Patient with elevated BNP to 22,740. Patient wife reports hx of elevated BNP to 35k. Bedside POCUS showing an EF of ˜45%, no wall motion abnormalities, w/ dilated IVC on echo. Patient is s/p lasix 60mg IV in ED. Follows at [facility] cardiology.     - Strict Is&Os   - f/u formal TTE  - hold on additional diuresis for now    #Hypertensive emergency  p/w fall w/ garbled speech w/ /140 in the field and 200/127 in ED, managed w/ cardine drip initially and currently on labetolol IV PRN.    [facility] my chart accessed on [employer] phone  - aldosterone <1, plasma renin 0.511    - Reduce BP by 25% IN 24 hrs  - ctm vitals  - fu US Abdomen w/ doppler to r/o SUKHJINDER,   - fu Plasma renin activity and aldosterone level to r/o hyperaldosteronism    PULMONARY:  #COPD  #MAC  # R/O lung malignancy  #R/O active TB  Presentation: iWOB, recent sick contact: wife (ROBI), BiPAP ---->NC. Home meds: Advair, Spiriva, hypertonic saline. Does not use home O2.   CT showing b/l cavitary+solid lung lesion concerning for primary lung malignancy, RUL s/p wedge resection, cannot exclude TB or fungal etiology w/ small left pleural effusion and Diffuse interstitial pulmonary edema.    [facility] my chart accessed on [employer] phone  -11/21/2024- Sputum AFB smear+, c/s Mycobacterium abscessus+ (also positive in june and july 2024)  - 07/02/2024- candida albicans+  - 9/5/2024- V/Q scan- low probability of PE  - 05/2024- Histoplasma neg, aspergillus neg, fungitell neg  - 3/2024- CT Chest wo contrast: waxing and waning nodular opacities b/l w/ enlarging ALEC 1.8cm nodule, RUL cavity stable and consistent with Chronic MAC.   - Treated for Mac for 4 months in Januray 2024 but d/c'd given side effects including diarrhea and orthostasis which are still ongoing.    - wean O2 as tolerated for a goal O2 saturation 82-92% given COPD  - continue to monitor respiratory status  - Pulmonology consult to establish dx: infectious vs autoimmune vs malignancy etiology  - fu sputum culture, induced sputum culture, strep PNA, Legionella, RSV, MRSA, Galactomannan, fungitell  - fu autoimune hairston to r/o GPA, sarcoid  - c/w Vanc+azithromycin_ zosyn 4.5g to cover NADIR  - fu pulmonology recs  - fu MRI Brain to r/o brain mets    #Pulmonary Nodules  CT chest with a preliminary read showing multiple cavitary and soft tissue mass lesions in bilateral lungs.   - f/u outpatient for workup  - PET neg for Cancer    #Nasal wall ulcer  2017 BX- SCC+    RENAL:  #Hyponatremia  Na+ 118 on presentation. Normal sodium in early November. Serum osmolality 260, urine osmolality 283, urine sodium 57. Hypovolemic on physical exam: dry MM and decreased skin turgor.  Etiology: Could be SIADH since Na improved from 118 to 121 w/ fluid restriction, given hypovolemic physical exam, CSW is a possibility given hypovolemic physical exam findings and p/w fall w/ headstrike.    - fluid restriction of 1L  - continue to monitor w/ Q4H BMP and q12h urine studies  - fu nephrology recs  - fu TSH and AM cortisol  - strict I and O    #Hypokalemia  #Hypomagnesemia  Likely in the setting of poor PO intake  Continue to monitor, replete as needed    #CKD   Patient with a hx of CKD, Hep C, Liver tx. Not currently getting dialysis. Baseline Cr 2.74 in early November.   UA- Protein>1000, RBC-26 represents a GN w/ nephritic range proteinuria    [facility] my chart accessed on daughters phone  - 11/21/2024 KFLC-272.2, LFLC- 28.4. K/L- 9.58, m-spike+, IgG-1089, iGa-211, iGm-72  - 11/21/2024  Uric acid-  6.7, LDH- 185  - 6/3/2024- Renal biopsy-                          - H&E- (1) segmental and global glomerulosclerosis, (2) diffuse global glomerulosclerosis (63%), w/ moderate to marked tubular atrophy & interstitial fibrosis w/ marked arterial and arteriolar sclerosis                         - LM - 16/24 glomeruli: globally sclerotic or show chronic hypoperfusion, 6/24: mild to moderate mesangial prominence, 2/24: segmental sclerosis, several bhavik show segmental "double contour" formation                         - IF - 4/6 globally sclerotic, Igm, Kappa, Lambda: trace granular mesagngial staining, IgG, IgA, C1q: no stain, 1+ C3- along tubular BM and in vessels, Casts- bitypic for both light chains. albumin neg, Fibrin neg                         - EM- 1/2 segmental sclerosis w/ subepithelial reparative changes as well as globular deposits consistent w/ hyaline w/ focal foot effacement.  GBM thickness mildly increaed, Subendothelial space:  no electron lucent expansion, No dense deposits. Mesangium: small electron dense deposits. Foci of mesangial interposition. No amyloid.   - 5/7/2024- iPTH- 112  - (3/2024) 24H Urine protein -1710, 75.2% paraprot excretion/24h  - 2/27/2024- 24H urine- 5-HIAA- 3.6 (WNL), Metanephrines- 35 (), normetanephrine- 122 (156-729), MPO neg, PR3 neg    - fu GN workup: Autoimmune (LAWANDA, ANCA, C3, C4, Cryoglobulin, anti-GBM ds, dsDNA), infection (HIV, Hep B, Hep C),   - continue to monitor  - avoid nephrotoxic agents       GI/:  #Liver transplant  S/p liver transplant in 3/2019 for cryptogenic cirrhosis. Home sirolimus 2mg QD.   - c/w sirolimus 2mg QD    #Unspecified Pancreatic Insufficiency   #Diarrhea  Elevated Alk Phos to 148 on admission. Likely elevated in the setting of pancreatic insufficiency.     [facility] my chart accessed on daughters phone  12/22/2023 MRI/MRCP abdomen w/ wo IV con: few pancreatic cystic lesions w/ indolent growth 1.6cm within pancreatic tail. no suspicious internal features, no main uct dilation. No solid mass  - C-Peptide- 10.4 (range 1.1-4.4)  - Fructosamine: 338 (0-285)  - low fecal elastatse 10/2023  - CMV+ 10/2023  - f/u GGT   - c/w home Creon   - c/w home loperamide     #NPO   Patient did not pass bedside dysphagia overnight 12/2-12/3. Patient NPO for now.   - Will reassess in AM    HEME:  #Chronic Anemia   Hemoglobin 11.8 on presentation. Could be 2/2 anemia of chronic disease or iron deficiency given CKD.   - continue to monitor    #MGUS  - elevated FLC w/ negative BM bx (7/2023)    ID:  #SIRS  #Leukocytosis  Met SIRS with tachypnea, tachycardia, and leukocytosis. Afebrile. Did have recent illness of URI. Low suspicion for bacterial infection at this time.   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 (Range, 2-96)                     PPX:  Fluids: fluid restriction of < 1L  Electrolytes: replete as needed  Nutrition: Diet, NPO:   Except Medications (12-03-24 @ 04:43) [Active]  PPI ppx: home famotidine 20mg BID  Dvt ppx: heparin 5000units subQ Q8H  Activity: fall precautions   Code Status: full code   WANDER VAZQUEZ is a 79y year old Male with a PMHx significant for hepatitis C s/p liver transplant in 3/2019 for cryptogenic cirrhosis (on sirolimus for 5 years), unspecified autonomic nervous system disorder w/ hx of orthostasis, COPD, Mycobacterium avium complex lung disease, chronic anemia, amaurosis fugax, Crohn's disease, BPH, and unspecified pancreatic insufficiency. Patient presented to the ED on 12/2 after having fallen at home with a head-strike, without loss of consciousness. Found to be hyponatremic to 118 and found to have hypertensive urgency on presentation. Admitted for further medical management of blood pressure and hyponatremia.     NEURO:  #Metabolic Encephalopathy (RESOLVED)  Patient presented to the ED with AMS in the setting of hyponatremia and hypertensive urgency (-240). AMS improved to baseline A&Ox3 w/blood pressure control. AMS likely 2/2 cerebral edema in the setting of hypertensive urgency.     #Concern for Stroke  Stroke called in ED for AMS and apparent facial droop. Pt states he felt dizzy prior to him falling in the shower.   NIHSS 4, notable for LUE/LLE dysmetria.   CT brain, CTA large vessel study showing no acute hemorrhages or occlusions. Low concern for stroke given improved AMS and metabolic derangements   - Stroke consulted, appreciate recs       - repeat imaging CTH or MRI if symptoms do not improve    CARDIOVASCULAR:  #Hypertensive Urgency with Encephalopathy (IMPROVING)  Patient presented to ED with blood pressure 200/127. S/p nicardipine gtt and lasix 60mg IV in ED. Blood pressure lowered by presentation to floor to 149/92. No longer encephalopathic. Elevated blood pressure could be 2/2 pain w/ head strike.   - continue to monitor blood pressure   /p IV Labetol 10mg x1, ctm BP.    #Troponinemia   Patient presented with an elevated troponin to 115. Tachycardic on presentation. EKG with no ischemic changes, Bedside POCUS showing no wall motion abnormalities. Elevated troponin in the setting of demand ischemia and CKD.    - trend troponin to peak    #HFmREF  [facility] my chart accessed on daughters phone  - Myocardial SPECT-  LVEF 44%, EKG- SR, rare PVC (11/2024)  - Tc-99 PYP amyloid scan- negative for TTR amyloid (10/29/2024)  - US Heart- small pericardial effusion (9/5/2024)  - pRO-bnp-2080 (9/22/2024)  Patient with elevated BNP to 22,740. Patient wife reports hx of elevated BNP to 35k. Bedside POCUS showing an EF of ˜45%, no wall motion abnormalities, w/ dilated IVC on echo. Patient is s/p lasix 60mg IV in ED. Follows at [facility] cardiology.     - Strict Is&Os   - f/u formal TTE  - hold on additional diuresis for now    #Hypertensive emergency  p/w fall w/ garbled speech w/ /140 in the field and 200/127 in ED, managed w/ cardine drip initially and currently on labetolol IV PRN.    [facility] my chart accessed on [employer] phone  - aldosterone <1, plasma renin 0.511    - Reduce BP by 25% IN 24 hrs  - ctm vitals  - fu US Abdomen w/ doppler to r/o SUKHJINDER,   - fu Plasma renin activity and aldosterone level to r/o hyperaldosteronism    PULMONARY:  #COPD  #MAC  # R/O lung malignancy  #R/O active TB  Presentation: iWOB, recent sick contact: wife (ROBI), BiPAP ---->NC. Home meds: Advair, Spiriva, hypertonic saline. Does not use home O2.   CT showing b/l cavitary+solid lung lesion concerning for primary lung malignancy, RUL s/p wedge resection, cannot exclude TB or fungal etiology w/ small left pleural effusion and Diffuse interstitial pulmonary edema.    [facility] my chart accessed on [employer] phone  -11/21/2024- Sputum AFB smear+, c/s Mycobacterium abscessus+ (also positive in june and july 2024)  - 07/02/2024- candida albicans+  - 9/5/2024- V/Q scan- low probability of PE  - 05/2024- Histoplasma neg, aspergillus neg, fungitell neg  - 3/2024- CT Chest wo contrast: waxing and waning nodular opacities b/l w/ enlarging ALEC 1.8cm nodule, RUL cavity stable and consistent with Chronic MAC.   - Treated for Mac for 8 weeks in summer 2023 but d/c'd given side effects mary anne with rifampin including diarrhea and orthostasis which are still ongoing.    - wean O2 as tolerated for a goal O2 saturation 82-92% given COPD  - continue to monitor respiratory status  - Pulmonology consult to establish dx: infectious vs autoimmune vs malignancy etiology  - fu sputum culture, induced sputum culture, strep PNA, Legionella, RSV, MRSA, Galactomannan, fungitell  - fu autoimune hairston to r/o GPA, sarcoid  - c/w Vanc+azithromycin_ zosyn 4.5g to cover NADIR  - fu pulmonology recs  - fu MRI Brain to r/o brain mets    #Pulmonary Nodules  CT chest with a preliminary read showing multiple cavitary and soft tissue mass lesions in bilateral lungs.   - f/u outpatient for workup  - PET neg for Cancer    #Nasal wall ulcer  2017 BX- SCC+    RENAL:  #Hyponatremia  Na+ 118 on presentation. Normal sodium in early November. Serum osmolality 260, urine osmolality 283, urine sodium 57. Hypovolemic on physical exam: dry MM and decreased skin turgor.  Etiology: Could be SIADH since Na improved from 118 to 121 w/ fluid restriction, given hypovolemic physical exam, CSW is a possibility given hypovolemic physical exam findings and p/w fall w/ headstrike.    - fluid restriction of 1L  - continue to monitor w/ Q4H BMP and q12h urine studies  - fu nephrology recs  - fu TSH and AM cortisol  - strict I and O    #Hypokalemia  #Hypomagnesemia  Likely in the setting of poor PO intake  Continue to monitor, replete as needed    #CKD   Patient with a hx of CKD, Hep C, Liver tx. Not currently getting dialysis. Baseline Cr 2.74 in early November.   UA- Protein>1000, RBC-26 represents a GN w/ nephritic range proteinuria    [facility] my chart accessed on daughters phone  - 11/21/2024 KFLC-272.2, LFLC- 28.4. K/L- 9.58, m-spike+, IgG-1089, iGa-211, iGm-72  - 11/21/2024  Uric acid-  6.7, LDH- 185  - 6/3/2024- Renal biopsy-                          - H&E- (1) segmental and global glomerulosclerosis, (2) diffuse global glomerulosclerosis (63%), w/ moderate to marked tubular atrophy & interstitial fibrosis w/ marked arterial and arteriolar sclerosis                         - LM - 16/24 glomeruli: globally sclerotic or show chronic hypoperfusion, 6/24: mild to moderate mesangial prominence, 2/24: segmental sclerosis, several bhavik show segmental "double contour" formation                         - IF - 4/6 globally sclerotic, Igm, Kappa, Lambda: trace granular mesagngial staining, IgG, IgA, C1q: no stain, 1+ C3- along tubular BM and in vessels, Casts- bitypic for both light chains. albumin neg, Fibrin neg                         - EM- 1/2 segmental sclerosis w/ subepithelial reparative changes as well as globular deposits consistent w/ hyaline w/ focal foot effacement.  GBM thickness mildly increaed, Subendothelial space:  no electron lucent expansion, No dense deposits. Mesangium: small electron dense deposits. Foci of mesangial interposition. No amyloid.   - 5/7/2024- iPTH- 112  - (3/2024) 24H Urine protein -1710, 75.2% paraprot excretion/24h  - 2/27/2024- 24H urine- 5-HIAA- 3.6 (WNL), Metanephrines- 35 (), normetanephrine- 122 (156-729), MPO neg, PR3 neg    - fu GN workup: Autoimmune (LAWANDA, ANCA, C3, C4, Cryoglobulin, anti-GBM ds, dsDNA), infection (HIV, Hep B, Hep C),   - continue to monitor  - avoid nephrotoxic agents       GI/:  #Liver transplant  S/p liver transplant in 3/2019 for cryptogenic cirrhosis. Home sirolimus 2mg QD.   - c/w sirolimus 2mg QD    #Unspecified Pancreatic Insufficiency   #Diarrhea  Elevated Alk Phos to 148 on admission. Likely elevated in the setting of pancreatic insufficiency.     [facility] my chart accessed on daughters phone  12/22/2023 MRI/MRCP abdomen w/ wo IV con: few pancreatic cystic lesions w/ indolent growth 1.6cm within pancreatic tail. no suspicious internal features, no main uct dilation. No solid mass  - C-Peptide- 10.4 (range 1.1-4.4)  - Fructosamine: 338 (0-285)  - low fecal elastatse 10/2023  - CMV+ 10/2023  - f/u GGT   - c/w home Creon   - c/w home loperamide     #NPO   Patient did not pass bedside dysphagia overnight 12/2-12/3. Patient NPO for now.   - Will reassess in AM    HEME:  #Chronic Anemia   Hemoglobin 11.8 on presentation. Could be 2/2 anemia of chronic disease or iron deficiency given CKD.   - continue to monitor    #MGUS  - elevated FLC w/ negative BM bx (7/2023)    ID:  #SIRS  #Leukocytosis  Met SIRS with tachypnea, tachycardia, and leukocytosis. Afebrile. Did have recent illness of URI. Low suspicion for bacterial infection at this time.   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 (Range, 2-96)                     PPX:  Fluids: fluid restriction of < 1L  Electrolytes: replete as needed  Nutrition: Diet, NPO:   Except Medications (12-03-24 @ 04:43) [Active]  PPI ppx: home famotidine 20mg BID  Dvt ppx: heparin 5000units subQ Q8H  Activity: fall precautions   Code Status: full code

## 2024-12-03 NOTE — ED PROVIDER NOTE - PHYSICAL EXAMINATION
VITAL SIGNS: I have reviewed nursing notes and confirm.  CONSTITUTIONAL: In severe respiratory distress speaking in 1-2 word sentences  SKIN:  warm and dry, no acute rash.   HEAD:  normocephalic, atraumatic.  EYES: EOM intact; conjunctiva and sclera clear.  ENT: No nasal discharge; airway clear.   NECK: Supple.  CARD: hypertensive and tachycardic PVCs on monitor multiple  RESP:  using accessory muscles of respiration severe respiratory distress on nonrebreather 15 L/min diffuse crackles intermittent wheezes  ABD: Normal bowel sounds; soft; non-distended; non-tender; no guarding/ rebound.  EXT:  bilateral 2-3+ pitting edema  NEURO: left-sided facial droop cranial nerve VII palsy dysarthria NIH stroke scale 5-6 no drift equal  strength  PSYCH: Cooperative, mood and affect appropriate.

## 2024-12-03 NOTE — CONSULT NOTE ADULT - ASSESSMENT
79M with PMH of cryptogenic cirrhosis (s/p liver transplant in 3/2019, on sirolimus for 5 years), unspecified autonomic nervous system disorder w/ hx of orthostasis, COPD, Mycobacterium avium complex lung disease, chronic anemia, amaurosis fugax, Crohn's disease, BPH, and unspecified pancreatic insufficiency, who first presented for a fall at home, admitted to [facility] for possible stroke work-up and to rule-out TB. Hepatology consulted for sirolimus dosing. LFTs currently within normal limits.     Recommendations:   - continue sirolimus 2 mg PO daily for now   - monitor renal function and check Cr daily   - rest of care per 7 [facility] Team   - trend CBC and CMP daily     Case discussed with Dr. Holliday. Hepatology Team will continue to follow.     Emilia Duffy D.O.   Gastroenterology Fellow  Weekday 7am-5pm Pager: [phone]  Weeknights/Weekend/Holiday Coverage: Please call the  for contact info

## 2024-12-03 NOTE — CONSULT NOTE ADULT - ASSESSMENT
WANDER VAZQUEZ is a 79y year old Male with a PMHx significant for hepatitis C s/p liver transplant in 3/2019 for cryptogenic cirrhosis (on sirolimus for 5 years), unspecified autonomic nervous system disorder w/ hx of orthostasis, COPD, Mycobacterium avium complex lung disease, chronic anemia, amaurosis fugax, Crohn's disease, BPH, and unspecified pancreatic insufficiency. Patient presented to the ED on 12/2 after having fallen at home with a head-strike, without loss of consciousness. Most of history is taken from patient's wife, who joined patient at bedside in ED. Patient's wife was in the shower and patient's wife heard a thud, saw him down on the ground and patient was altered. On presentation his blood pressure was 200/127 without vision changes, or chest pain (patient allegedly had BP of 240/140 when initially found by EMS). Of note, the patient's wife had a recent URI for which the wife suspects the patient may have started developing symptoms 1 week ago. Patient was altered in the ED.    WANDER VAZQUEZ is a 79y year old Male with a PMHx significant for hepatitis C s/p liver transplant in 3/2019 for cryptogenic cirrhosis (on sirolimus for 5 years), unspecified autonomic nervous system disorder w/ hx of orthostasis, COPD, Mycobacterium avium complex lung disease, chronic anemia, amaurosis fugax, Crohn's disease, BPH, and unspecified pancreatic insufficiency. Patient presented to the ED on 12/2 after having fallen at home with a head-strike, without loss of consciousness. Most of history is taken from patient's wife, who joined patient at bedside in ED. Patient's wife was in the shower and patient's wife heard a thud, saw him down on the ground and patient was altered. On presentation his blood pressure was 200/127 without vision changes, or chest pain (patient allegedly had BP of 240/140 when initially found by EMS). Of note, the patient's wife had a recent URI for which the wife suspects the patient may have started developing symptoms 1 week ago. Patient was altered in the ED.     #Hyponatremia  Na+ 118 on presentation. Normal sodium in early November. Serum osmolality 260, urine osmolality 283, urine sodium 57. Urine studies showing SIADH likely in the setting of URI infection.   - fluid restriction of 1L  - continue to monitor w/ Q4H BMP     #Hypokalemia  #Hypomagnesemia  Likely in the setting of poor PO intake  Continue to monitor, replete as needed    #CKD   Patient with a hx of CKD. Not on dialysis. Baseline Cr 2.74 in early November.   - continue to monitor  - avoid nephrotoxic agents   - please obtain following labs: aldosterone, anca, ACE, AM cortisol, CK, cryoglobulin, CCPA, ds DNA, fungitell, glomerular basement membrane Ab IgG, serum immunoelectrophoresis, serum immunofixation, immunoglobulins panel, UPCR, Plasma renin, TSH w/ free T4, urea nitrogen, u/a   WANDER VAZQUEZ is a 79y year old Male with a PMHx significant for hepatitis C s/p liver transplant in 3/2019 for cryptogenic cirrhosis (on sirolimus for 5 years), unspecified autonomic nervous system disorder w/ hx of orthostasis, COPD, Mycobacterium avium complex lung disease, chronic anemia, amaurosis fugax, Crohn's disease, BPH, and unspecified pancreatic insufficiency. Patient presented to the ED on 12/2 after having fallen at home with a head-strike, without loss of consciousness. Most of history is taken from patient's wife, who joined patient at bedside in ED. Patient's wife was in the shower and patient's wife heard a thud, saw him down on the ground and patient was altered. On presentation his blood pressure was 200/127 without vision changes, or chest pain (patient allegedly had BP of 240/140 when initially found by EMS). Of note, the patient's wife had a recent URI for which the wife suspects the patient may have started developing symptoms 1 week ago. Patient was altered in the ED.     #Hyponatremia  Na+ 118 on presentation. Normal sodium in early November. Serum osmolality 260, urine osmolality 283, urine sodium 57. Urine studies showing SIADH likely in the setting of URI infection.   - fluid restriction of 1L  - continue to monitor w/ Q4H BMP     #Hypokalemia  #Hypomagnesemia  Likely in the setting of poor PO intake  Continue to monitor, replete as needed    #CKD   Patient with a hx of CKD. Not on dialysis. Baseline Cr 2.74 in early November.   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   sCr 1.6 on 2017 and 2.5->2.9 from 2-10/2024. With UPCR 7.1 on 2/2024    Broad differential at this point, can not r/o: RPGN, IRGN, GN(specially IgAN), TMA, AIN.    Pte also received IV contrast on 12/3, this will modify U. spec gravity/U. osmo/U. Na results and is also at higher risk for NORA.   Will also r/o secondary causes of HTN such as Hyperaldosteronism, SUKHJINDER, Pheochromocytoma, Hyperthyroidism ...  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   Severe Hypotonic hyponatremia with U. osmo 283 and U. Na 57 is suggestive of SAIDH. Less likely CSWS, specially iso HTN emergency.   Will treat as chronic hyponatremia, pte is a High risk for ODS. Correction rate of 6-8meq in 24h period, not higher than 8meq.   Na goal is 124-126meq for 12/4 in am labs, not higher than 126meq.     -Plan:  BMP and U. osmo q6h during next 24h. If f/u bmp showing Na>124 will start DDAVP clamp with 2mcg IV q6h(standing).   For GN panel, please send: C3/C4 levels, Cryoglobulins, dsDNA, MPO, PR3, ANCA panel, anti GBM, s. Immunoelectrophoresis(SPEP/FLC/..), [id number], LDH levels, Haptoglobin, peripheral smear(schistocytes), HCV VL, HVB panel, HIV. Can also send PLA2R levels for upcr 7.1 in op labs.   Obtain Aldosterone and Renin levels, also Renin plasma activity.   Will calculate FeK: obtain U. K and U. creatinine, to determine if Renal vs GI losses.   Replenish K and Mg aggressively. Aim for K>4.0 and Mg>2.0  Fluid restriction<1L daily for now.   Obtain TSH, U. acid and am Cortisol levels.   Obtain Renal US w Doppler, will r/o SUHKJINDER and RVT(less likely).   Obtain Cystatin C.   Obtain U. Histoplasma ag.   Repeat UA with microscopy.   Obtain U. prot/creat ratio and U. album/creat ratio.   Strict I&O. Ensure there is no urinary retention, bladder scans q6h and straight cath as need if no Dias placed.   Avoid hypotension or sudden SBP drops. Aim for SBP~140, for now.   Pain/temperature management as per primary team, avoid nephrotoxic agents.   Renally dosed abx.   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 Will f/u lab results and modify treatment as need it once we establish the etiology of his renal impairment. We are r/o etiologies that can be treated immediately either with steroids/plasmapheresis/Eculizumab/Rituximab...   No indication for RRT at this point.              WANDER VAZQUEZ is a 79y year old Male with a PMHx significant for hepatitis C s/p liver transplant in 3/2019 for cryptogenic cirrhosis (on sirolimus for 5 years), unspecified autonomic nervous system disorder w/ hx of orthostasis, COPD, Mycobacterium avium complex lung disease, chronic anemia, amaurosis fugax, Crohn's disease, BPH, and unspecified pancreatic insufficiency. Patient presented to the ED on 12/2 after having fallen at home with a head-strike, without loss of consciousness. Most of history is taken from patient's wife, who joined patient at bedside in ED. Patient's wife was in the shower and patient's wife heard a thud, saw him down on the ground and patient was altered. On presentation his blood pressure was 200/127 without vision changes, or chest pain (patient allegedly had BP of 240/140 when initially found by EMS). Of note, the patient's wife had a recent URI for which the wife suspects the patient may have started developing symptoms 1 week ago. Patient was altered in the ED.     #Hyponatremia  Na+ 118 on presentation. Normal sodium in early November. Serum osmolality 260, urine osmolality 283, urine sodium 57. Urine studies showing SIADH likely in the setting of URI infection.   - fluid restriction of 1L  - continue to monitor w/ Q4H BMP     #Hypokalemia  #Hypomagnesemia  Likely in the setting of poor PO intake  Continue to monitor, replete as needed    #CKD   Patient with a hx of CKD. Not on dialysis. Baseline Cr 2.74 in early November.   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   sCr 1.6 on 2017 and 2.5->2.9 from 2-10/2024. With UPCR 7.1 on 2/2024    Broad differential at this point, can not r/o: RPGN, IRGN, GN(specially IgAN), TMA, AIN.    Pte also received IV contrast on 12/3, this will modify U. spec gravity/U. osmo/U. Na results and is also at higher risk for NORA.   Will also r/o secondary causes of HTN such as Hyperaldosteronism, SUKHJINDER, Pheochromocytoma, Hyperthyroidism ...  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   Severe Hypotonic hyponatremia with U. osmo 283 and U. Na 57 is suggestive of SAIDH. Less likely CSWS, specially iso HTN emergency.   Will treat as chronic hyponatremia, pte is a High risk for ODS. Correction rate of 6-8meq in 24h period, not higher than 8meq.   Na goal is 124-126meq for 12/4 in am labs, not higher than 126meq.     -Plan:  BMP and U. osmo q6h during next 24h. If f/u bmp showing Na>124 will start DDAVP clamp with 2mcg IV q6h(standing).   For GN panel, please send: C3/C4 levels, Cryoglobulins, dsDNA, MPO, PR3, ANCA panel, anti GBM, s. Immunoelectrophoresis(SPEP/FLC/..), [id number], LDH levels, Haptoglobin, peripheral smear(schistocytes), HCV VL, HVB panel, HIV. Can also send PLA2R levels for upcr 7.1 in op labs.   Obtain Aldosterone and Renin levels, also Renin plasma activity.   Will calculate FeK: obtain U. K and U. creatinine, to determine if Renal vs GI losses.   Replenish K and Mg aggressively. Aim for K>4.0 and Mg>2.0  Fluid restriction<1L daily for now.   Obtain TSH, U. acid and am Cortisol levels.   Obtain Renal US w Doppler, will r/o SUKHJINDER and RVT(less likely).   Obtain Cystatin C.   Obtain U. Legionella ag.   Repeat UA with microscopy.   Obtain U. prot/creat ratio and U. album/creat ratio.   Strict I&O. Ensure there is no urinary retention, bladder scans q6h and straight cath as need if no Dias placed.   Avoid hypotension or sudden SBP drops. Aim for SBP~140, for now.   Pain/temperature management as per primary team, avoid nephrotoxic agents.   Renally dosed abx.   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 Will f/u lab results and modify treatment as need it once we establish the etiology of his renal impairment. We are r/o etiologies that can be treated immediately either with steroids/plasmapheresis/Eculizumab/Rituximab...   No indication for RRT at this point.

## 2024-12-03 NOTE — PROGRESS NOTE ADULT - SUBJECTIVE AND OBJECTIVE BOX
*****INCOMPLETE NOTE*****    INTERVAL HPI/OVERNIGHT EVENTS:    SUBJECTIVE: Patient seen and examined at bedside, resting comfortably in bed, and does not appear to be in any acute distress. Patient reports    Vital Signs Last 24 Hrs  T(C): 36.5 (03 Dec 2024 05:42), Max: 36.5 (03 Dec 2024 05:42)  T(F): 97.7 (03 Dec 2024 05:42), Max: 97.7 (03 Dec 2024 05:42)  HR: 108 (03 Dec 2024 05:07) (104 - 120)  BP: 182/81 (03 Dec 2024 05:07) (147/100 - 200/127)  BP(mean): 116 (03 Dec 2024 05:07) (116 - 122)  RR: 18 (03 Dec 2024 05:07) (18 - 24)  SpO2: 96% (03 Dec 2024 05:07) (95% - 99%)    Parameters below as of 03 Dec 2024 05:07  Patient On (Oxygen Delivery Method): nasal cannula  O2 Flow (L/min): 2      PHYSICAL EXAM:  General: in no acute distress  Eyes: EOMI intact bilaterally. Anicteric sclerae, moist conjunctivae  HENT: Moist mucous membranes  Neck: Trachea midline, supple  Lungs: CTA B/L. No wheezes, rales, or rhonchi  Cardiovascular: RRR. No murmurs, rubs, or gallops  Abdomen: Soft, non-tender non-distended; No rebound or guarding  Extremities: WWP, No clubbing, cyanosis or edema  Neurological: Alert and oriented  Skin: Warm and dry. No obvious rash     LABS:                        11.8   12.57 )-----------( 161      ( 03 Dec 2024 00:02 )             35.6     12-03    118[LL]  |  81[L]  |  24[H]  ----------------------------<  168[H]  2.9[LL]   |  25  |  2.11[H]    Ca    8.4      03 Dec 2024 01:06  Mg     1.3     12-03    TPro  5.8[L]  /  Alb  3.1[L]  /  TBili  0.6  /  DBili  x   /  AST  23  /  ALT  12  /  AlkPhos  148[H]  12-03   INTERVAL HPI/OVERNIGHT EVENTS: admitted to [facility] and hypertenisve to 200s, placed on cardine drip and BIPAP for iWOB. stroke code called for facial droop howeer workup unremarkable. Weaned off of drip and Bipap. REcieved 60mg of lasix in ED. ICU consulted and cardiology consutled to evaluate cardiac function gfiven concenr for reduced EF however does not appear to be in florid state of failure. Patient admitted to . Failed bedside dysphagia and SLP.    SUBJECTIVE: Patient seen and examined at bedside, looks lethargic but responding appropriately.     Vital Signs Last 24 Hrs  T(C): 36.5 (03 Dec 2024 05:42), Max: 36.5 (03 Dec 2024 05:42)  T(F): 97.7 (03 Dec 2024 05:42), Max: 97.7 (03 Dec 2024 05:42)  HR: 108 (03 Dec 2024 05:07) (104 - 120)  BP: 182/81 (03 Dec 2024 05:07) (147/100 - 200/127)  BP(mean): 116 (03 Dec 2024 05:07) (116 - 122)  RR: 18 (03 Dec 2024 05:07) (18 - 24)  SpO2: 96% (03 Dec 2024 05:07) (95% - 99%)    Parameters below as of 03 Dec 2024 05:07  Patient On (Oxygen Delivery Method): nasal cannula  O2 Flow (L/min): 2      PHYSICAL EXAM:  General: lethargic  Eyes: EOMI intact bilaterally. Anicteric sclerae, moist conjunctivae  HENT: Dry mucous membranes, ecchymotic patch on temporal area  Neck: Trachea midline, supple  Lungs: CTA B/L. No wheezes, rales, or rhonchi  Cardiovascular: RRR. No murmurs, rubs, or gallops  Abdomen: Soft, non-tender non-distended; No rebound or guarding  Extremities: WWP, No clubbing, cyanosis or edema, decreased skin turgor, ecchymotic patches on b/l UE  Neurological: Alert and oriented  Skin: Warm and dry. No obvious rash     LABS:                        11.8   12.57 )-----------( 161      ( 03 Dec 2024 00:02 )             35.6     12-03    118[LL]  |  81[L]  |  24[H]  ----------------------------<  168[H]  2.9[LL]   |  25  |  2.11[H]    Ca    8.4      03 Dec 2024 01:06  Mg     1.3     12-03    TPro  5.8[L]  /  Alb  3.1[L]  /  TBili  0.6  /  DBili  x   /  AST  23  /  ALT  12  /  AlkPhos  148[H]  12-03

## 2024-12-03 NOTE — H&P ADULT - HISTORY OF PRESENT ILLNESS
WANDER VAZQUEZ is a 79y year old Male with a PMHx significant for hepatitis C s/p liver transplant in 3/2019 for cryptogenic cirrhosis (on sirolimus for 5 years), unspecified autonomic nervous system disorder w/ hx of orthostasis, COPD, Mycobacterium avium complex lung disease, chronic anemia, amaurosis fugax, Crohn's disease, BPH, and unspecified pancreatic insufficiency. Patient presented to the ED on 12/2 after having fallen at home with a head-strike, without loss of consciousness. Most of history is taken from patient's wife, who joined patient at bedside in ED. Patient's wife was in the shower and patient's wife heard a thud, saw him down on the ground and patient was altered. On presentation his blood pressure was 200/127 without vision changes, or chest pain (patient allegedly had BP of 240/140 when initially found by EMS). Of note, the patient's wife had a recent URI for which the wife suspects the patient may have started developing symptoms 1 week ago. Patient was altered in the ED.     Of note baseline labs for patient retrieved in early November were ALT 9, AST 16, Bilirubin 0.4, direct 0.2, Cr 2.74, Albumin 3.2, K 4, Na 139, GGT 18, Mg 1.4, Phos 3.6.    ED COURSE  Vitals: T(C): 36.4 (12-02-24 @ 23:57), Max: 36.4 (12-02-24 @ 23:57); HR: 106 (12-03-24 @ 02:14) (106 - 120); BP: 149/92 (12-03-24 @ 02:14) (147/100 - 200/127); RR: 24 (12-03-24 @ 02:14) (24 - 24); SpO2: 98% (12-03-24 @ 02:14) (98% - 99%)  Labs:   EKG: EKG - normal axis, sinus tachycardia w/ premature supraventricular complexes; no ST elevations or depressions  Imaging: CT cervical spine --> no acute pathologies; CT chest --> small left pleural effusion, multiple cavitary and soft tissue mass lesions in the bilateral lungs, severe centrilobar emphysema; CT Perfusion --> no large vessel occlusions  Interventions:   Consults: MICU, Neuro ICU, CCU; stroke code called    INTERIM SUBJECTIVE:  WANDER VAZQUEZ is doing okay this early morning. Has no acute complaints. He is alert and oriented. Denies fever, chills, headache, chest pain.

## 2024-12-03 NOTE — H&P ADULT - ATTENDING COMMENTS
79 M with amaurosis fugax, autonomic nervous system disorder with orthostasis, COPD, MAC lung disease, CKD, hepatitis C, cirrhosis, liver transplant in 3/2019 (on sirolimus for 5 years), Crohn's disease, pancreatic insufficiency, chronic anemia, and BPH presented a fall at home with a head-strike, without loss of consciousness. Initial BP was 240/140  by EMS, 200/127 in ED, altered. He denied vision changes, or chest pain. Of note, the patient's wife had a recent URI for which the wife suspects the patient may have started developing symptoms 1 week ago. Physical exam as above. CT H/N/A negative. CT chest showed cavitary and nodular lesions superimposed on emphysematous changes. Labs significant for hyponatremia, hypokalemia, hypomagnesemia, and elevated creatinine.   1. Hypertensive urgency (with encephlopathy)  2. Hyponatremia (likely SIADH)  3. Hypokalemia  4. Hypomagnesemia  5. Cavitary PNA (MAC?)  6. CKD  7. Anemia  - serial labs  - replete potassium, mag  - water restriction  - sirolimus level  - blood culture, sputum culture, MRSA swab, viral panel  - Zosyn/vanco/azithro  - outside records and imaging of the chest  - Pulmonary consult  - ID consult  - echo

## 2024-12-03 NOTE — ED PROVIDER NOTE - CRITICAL CARE ATTENDING CONTRIBUTION TO CARE
Critical Care Procedure Note  Authorized and Performed by:   DO EDA Valles  Total critical care time: Approximately 45 minutes  Due to a high probability of clinically significant, life threatening deterioration, the patient required my highest level of preparedness to intervene emergently and I personally spent this critical care time directly and personally managing the patient. This critical care time included obtaining a history; examining the patient; pulse oximetry; ordering and review of studies; arranging urgent treatment with development of a management plan; evaluation of patient's response to treatment; frequent reassessment; and, discussions with other providers.  This critical care time was performed to assess and manage the high probability of imminent, life-threatening deterioration that could result in multi-organ failure. It was exclusive of separately billable procedures and treating other patients and teaching time.  Please see MDM section and the rest of the note for further information on patient assessment and treatment.

## 2024-12-03 NOTE — H&P ADULT - ASSESSMENT
WANDER VAZQUEZ is a 79y year old Male with a PMHx significant for hepatitis C s/p liver transplant in 3/2019 for cryptogenic cirrhosis (on sirolimus for 5 years), unspecified autonomic nervous system disorder w/ hx of orthostasis, COPD, Mycobacterium avium complex lung disease, chronic anemia, amaurosis fugax, Crohn's disease, BPH, and unspecified pancreatic insufficiency. Patient presented to the ED on 12/2 after having fallen at home with a head-strike, without loss of consciousness. Found to be hyponatremic to 118 and found to have hypertensive urgency on presentation. Admitted for further medical management of blood pressure and hyponatremia.     NEURO:  #Metabolic Encephalopathy (RESOLVED)  Patient presented to the ED with AMS in the setting of hyponatremia and hypertensive urgency. AMS improved to baseline A&Ox3 upon blood pressure lowering. AMS likely 2/2 cerebral edema in the setting of hypertensive urgency.     #Concern for Stroke  Stroke called in ED for AMS and apparent facial droop. CT brain, CTA large vessel study showing no acute hemorrhages or occlusions. Low concern for stroke given improved AMS and metabolic derangements   - Stroke consulted, appreciate recs       - repeat imaging CTH or MRI if symptoms do not improve    CARDIOVASCULAR:  #Hypertensive Urgency with Encephalopathy (IMPROVING)  Patient presented to ED with blood pressure 200/127. S/p nicardipine gtt and lasix 60mg IV in ED. Blood pressure lowered by presentation to floor to 149/92. No longer encephalopathic. Elevated blood pressure could be 2/2 pain w/ head strike.   - continue to monitor blood pressure   - hold BP lowering medications for now     #Troponinemia   Patient presented with an elevated troponin to 115. Tachycardic on presentation. EKG with no ischemic changes, Bedside POCUS showing no wall motion abnormalities. Elevated troponin in the setting of demand ischemia and CKD.    - trend troponin to peak    #HFmREF  Patient with elevated BNP to 22,740. Patient wife reports hx of elevated BNP to 35k. Bedside POCUS showing an EF of ˜45%, no wall motion abnormalities, w/ dilated IVC on echo. Patient is s/p lasix 60mg IV in ED. Follows at [facility] cardiology.   - Strict Is&Os   - f/u TTE  - hold on additional diuresis for now    PULMONARY:  #COPD  #MAC  Patient with increased work of breathing on presentation in the setting of sick contact from wife who recently had URI. Started on BiPAP on initial presentation then weaned to NC. Thinks patient has had URI symptoms for 1 week. Patient on Advair, Spiriva, hypertonic saline at home. Does not use home O2. CT showing small left pleural effusion, no obvious consolidations. Increased oxygen requirement likely in the setting of URI and underlying lung pathology.   - wean O2 as tolerated for a goal O2 saturation 82-92% given COPD  - continue to monitor respiratory status    #Pulmonary Nodules  CT chest with a preliminary read showing multiple cavitary and soft tissue mass lesions in bilateral lungs.   - f/u outpatient for workup    RENAL:  #Hyponatremia  Na+ 118 on presentation. Normal sodium in early November. Serum osmolality 260, urine osmolality 283, urine sodium 57. Urine studies showing SIADH likely in the setting of URI infection.   - fluid restriction of 1L  - continue to monitor w/ Q4H BMP     #Hypokalemia  #Hypomagnesemia  Likely in the setting of poor PO intake  Continue to monitor, replete as needed    #CKD   Patient with a hx of CKD. Not currently getting dialysis. Baseline Cr 2.74 in early November.   - continue to monitor  - avoid nephrotoxic agents     GI/:  #Liver transplant  S/p liver transplant in 3/2019 for cryptogenic cirrhosis. Home sirolimus 2mg QD.   - c/w sirolimus 2mg QD    #Unspecified Pancreatic Insufficiency   #Diarrhea  Elevated Alk Phos to 148 on admission. Likely elevated in the setting of pancreatic insufficiency.   - f/u GGT   - c/w home Creon   - c/w home loperamide     #NPO   Patient did not pass bedside dysphagia overnight 12/2-12/3. Patient NPO for now.   - Will reassess in AM    HEME:  #Chronic Anemia   Hemoglobin 11.8 on presentation. Could be 2/2 anemia of chronic disease or iron deficiency given CKD.   - continue to monitor    ID:  #SIRS  #Leukocytosis  Met SIRS with tachypnea, tachycardia, and leukocytosis. Afebrile. Did have recent illness of URI. Low suspicion for bacterial infection at this time.   - Continue to monitor off antibiotics for now  - f/u RVP    ENDOCRINE:  - LISETH    PPX:  Fluids: fluid restriction of < 1L  Electrolytes: replete as needed  Nutrition: Diet, NPO:   Except Medications (12-03-24 @ 04:43) [Active]  PPI ppx: home famotidine 20mg BID  Dvt ppx: heparin 5000units subQ Q8H  Activity: fall precautions    WANDER VAZQUEZ is a 79y year old Male with a PMHx significant for hepatitis C s/p liver transplant in 3/2019 for cryptogenic cirrhosis (on sirolimus for 5 years), unspecified autonomic nervous system disorder w/ hx of orthostasis, COPD, Mycobacterium avium complex lung disease, chronic anemia, amaurosis fugax, Crohn's disease, BPH, and unspecified pancreatic insufficiency. Patient presented to the ED on 12/2 after having fallen at home with a head-strike, without loss of consciousness. Found to be hyponatremic to 118 and found to have hypertensive urgency on presentation. Admitted for further medical management of blood pressure and hyponatremia.     NEURO:  #Metabolic Encephalopathy (RESOLVED)  Patient presented to the ED with AMS in the setting of hyponatremia and hypertensive urgency. AMS improved to baseline A&Ox3 upon blood pressure lowering. AMS likely 2/2 cerebral edema in the setting of hypertensive urgency.     #Concern for Stroke  Stroke called in ED for AMS and apparent facial droop. CT brain, CTA large vessel study showing no acute hemorrhages or occlusions. Low concern for stroke given improved AMS and metabolic derangements   - Stroke consulted, appreciate recs       - repeat imaging CTH or MRI if symptoms do not improve    CARDIOVASCULAR:  #Hypertensive Urgency with Encephalopathy (IMPROVING)  Patient presented to ED with blood pressure 200/127. S/p nicardipine gtt and lasix 60mg IV in ED. Blood pressure lowered by presentation to floor to 149/92. No longer encephalopathic. Elevated blood pressure could be 2/2 pain w/ head strike.   - continue to monitor blood pressure   - hold BP lowering medications for now     #Troponinemia   Patient presented with an elevated troponin to 115. Tachycardic on presentation. EKG with no ischemic changes, Bedside POCUS showing no wall motion abnormalities. Elevated troponin in the setting of demand ischemia and CKD.    - trend troponin to peak    #HFmREF  Patient with elevated BNP to 22,740. Patient wife reports hx of elevated BNP to 35k. Bedside POCUS showing an EF of ˜45%, no wall motion abnormalities, w/ dilated IVC on echo. Patient is s/p lasix 60mg IV in ED. Follows at [facility] cardiology.   - Strict Is&Os   - f/u TTE  - hold on additional diuresis for now    PULMONARY:  #COPD  #MAC  Patient with increased work of breathing on presentation in the setting of sick contact from wife who recently had URI. Started on BiPAP on initial presentation then weaned to NC. Thinks patient has had URI symptoms for 1 week. Patient on Advair, Spiriva, hypertonic saline at home. Does not use home O2. CT showing small left pleural effusion, no obvious consolidations. Increased oxygen requirement likely in the setting of URI and underlying lung pathology.   - wean O2 as tolerated for a goal O2 saturation 82-92% given COPD  - continue to monitor respiratory status    #Pulmonary Nodules  CT chest with a preliminary read showing multiple cavitary and soft tissue mass lesions in bilateral lungs.   - f/u outpatient for workup    RENAL:  #Hyponatremia  Na+ 118 on presentation. Normal sodium in early November. Serum osmolality 260, urine osmolality 283, urine sodium 57. Urine studies showing SIADH likely in the setting of URI infection.   - fluid restriction of 1L  - continue to monitor w/ Q4H BMP     #Hypokalemia  #Hypomagnesemia  Likely in the setting of poor PO intake  Continue to monitor, replete as needed    #CKD   Patient with a hx of CKD. Not currently getting dialysis. Baseline Cr 2.74 in early November.   - continue to monitor  - avoid nephrotoxic agents     GI/:  #Liver transplant  S/p liver transplant in 3/2019 for cryptogenic cirrhosis. Home sirolimus 2mg QD.   - c/w sirolimus 2mg QD    #Unspecified Pancreatic Insufficiency   #Diarrhea  Elevated Alk Phos to 148 on admission. Likely elevated in the setting of pancreatic insufficiency.   - f/u GGT   - c/w home Creon   - c/w home loperamide     #NPO   Patient did not pass bedside dysphagia overnight 12/2-12/3. Patient NPO for now.   - Will reassess in AM    HEME:  #Chronic Anemia   Hemoglobin 11.8 on presentation. Could be 2/2 anemia of chronic disease or iron deficiency given CKD.   - continue to monitor    ID:  #SIRS  #Leukocytosis  Met SIRS with tachypnea, tachycardia, and leukocytosis. Afebrile. Did have recent illness of URI. Low suspicion for bacterial infection at this time.   - Continue to monitor off antibiotics for now  - f/u RVP    ENDOCRINE:  - LISETH    PPX:  Fluids: fluid restriction of < 1L  Electrolytes: replete as needed  Nutrition: Diet, NPO:   Except Medications (12-03-24 @ 04:43) [Active]  PPI ppx: home famotidine 20mg BID  Dvt ppx: heparin 5000units subQ Q8H  Activity: fall precautions   Code Status: full code

## 2024-12-03 NOTE — CONSULT NOTE ADULT - ASSESSMENT
79y year old Male with a PMHx significant for hepatitis C s/p liver transplant in 3/2019 for cryptogenic cirrhosis (on sirolimus for 5 years), unspecified autonomic nervous system disorder w/ hx of orthostasis, COPD, Mycobacterium avium complex lung disease, chronic anemia, amaurosis fugax, Crohn's disease, BPH, and unspecified pancreatic insufficiency. Patient presented to the ED on 12/2 after having fallen at home with a head-strike, without loss of consciousness. ICU consulted for hyponatremia to 118 and hypertensive urgency.     NEURO:  #Metabolic Encephalopathy- resolving  Patient presented to the ED with AMS in the setting of hyponatremia and hypertensive urgency. AMS likely 2/2 cerebral edema in the setting of hypertensive urgency. Stroke code called as below with negative imaging    #Concern for Stroke  Stroke called in ED for AMS and apparent facial droop. CT brain, CTA large vessel study showing no acute hemorrhages or occlusions. Low concern for stroke given improved AMS and metabolic derangements   - Stroke consulted, appreciate recs       - repeat imaging CTH or MRI if symptoms do not improve    CARDIOVASCULAR:  #Hypertensive Urgency with Encephalopathy   Patient presented to ED with blood pressure 200/127. S/p nicardipine gtt and lasix 60mg IV in ED. Blood pressure lowered by presentation to floor to 149/92. Elevated blood pressure could be 2/2 pain w/ head strike. Also on sirolimus which elevates the blood pressure.  - continue to monitor blood pressure, no further BP meds given after nicardipine gtt in ED    #Troponinemia   Patient presented with an elevated troponin to 115. Tachycardic on presentation. EKG with no ischemic changes, Bedside POCUS showing no wall motion abnormalities. Elevated troponin in the setting of demand ischemia and CKD.    - trend troponin to peak    #HFmREF  Patient with elevated BNP to 22,740. Patient wife reports hx of elevated BNP to 35k. Bedside POCUS showing an EF of ˜45%, no wall motion abnormalities, w/ dilated IVC on echo. Patient is s/p lasix 60mg IV in ED. Follows at [facility] cardiology.   - Strict Is&Os   - f/u TTE  - hold on additional diuresis for now    PULMONARY:  #COPD  #MAC  Patient with increased work of breathing on presentation in the setting of sick contact from wife who recently had URI. Started on BiPAP on initial presentation then weaned to NC. Thinks patient has had URI symptoms for 1 week. Patient on Advair, Spiriva, hypertonic saline at home. Does not use home O2. CT showing small left pleural effusion, no obvious consolidations. Increased oxygen requirement likely in the setting of URI and underlying lung pathology.   - wean O2 as tolerated for a goal O2 saturation 82-92% given COPD  - continue to monitor respiratory status    #Pulmonary Nodules  CT chest with a preliminary read showing multiple cavitary and soft tissue mass lesions in bilateral lungs.   - f/u outpatient for workup    RENAL:  #Hyponatremia  Na+ 118 on presentation. Normal sodium in early November. Serum osmolality 260, urine osmolality 283, urine sodium 57. Urine studies showing SIADH likely in the setting of URI infection.   - fluid restriction of 1L  - continue to monitor w/ Q4H BMP     #Hypokalemia  #Hypomagnesemia  Likely in the setting of poor PO intake  Continue to monitor, replete as needed    #CKD   Patient with a hx of CKD. Not currently getting dialysis. Baseline Cr 2.74 in early November.   - continue to monitor  - avoid nephrotoxic agents     GI/:  #Liver transplant  S/p liver transplant in 3/2019 for cryptogenic cirrhosis. Home sirolimus 2mg QD.   - c/w sirolimus 2mg QD    #Unspecified Pancreatic Insufficiency   #Diarrhea  Elevated Alk Phos to 148 on admission. Likely elevated in the setting of pancreatic insufficiency.   - f/u GGT   - c/w home Creon   - c/w home loperamide     #NPO   - f/u bedside dysphagia    HEME:  #Chronic Anemia   Hemoglobin 11.8 on presentation. Could be 2/2 anemia of chronic disease or iron deficiency given CKD.   - continue to monitor    ID:  #SIRS  #Leukocytosis  Met SIRS with tachypnea, tachycardia, and leukocytosis. Afebrile. Did have recent illness of URI. Low suspicion for bacterial infection at this time.   - Continue to monitor off antibiotics for now  - f/u RVP    ENDOCRINE:  - LISETH    FEN:  Replete lytes PRN K>4, Mg>2  DVT PPx: heparin subq  Dispo: Patient requires monitoring on telemetry    Discussed with attending Dr. Renner

## 2024-12-04 LAB
A1AT SERPL-MCNC: 206 MG/DL — HIGH (ref 90–200)
A1C WITH ESTIMATED AVERAGE GLUCOSE RESULT: 5.9 % — HIGH (ref 4–5.6)
ALBUMIN SERPL ELPH-MCNC: 2.7 G/DL — LOW (ref 3.3–5)
ALBUMIN, RANDOM URINE: 183.7 MG/DL — SIGNIFICANT CHANGE UP
ALBUMIN/CREATININE RATIO (ACR): 4710 MG/G — HIGH (ref 0–30)
ALP SERPL-CCNC: 109 U/L — SIGNIFICANT CHANGE UP (ref 40–120)
ALT FLD-CCNC: 11 U/L — SIGNIFICANT CHANGE UP (ref 10–45)
ANION GAP SERPL CALC-SCNC: 10 MMOL/L — SIGNIFICANT CHANGE UP (ref 5–17)
ANION GAP SERPL CALC-SCNC: 11 MMOL/L — SIGNIFICANT CHANGE UP (ref 5–17)
APPEARANCE UR: CLEAR — SIGNIFICANT CHANGE UP
AST SERPL-CCNC: 21 U/L — SIGNIFICANT CHANGE UP (ref 10–40)
BASOPHILS # BLD AUTO: 0.02 K/UL — SIGNIFICANT CHANGE UP (ref 0–0.2)
BASOPHILS NFR BLD AUTO: 0.3 % — SIGNIFICANT CHANGE UP (ref 0–2)
BILIRUB SERPL-MCNC: 0.3 MG/DL — SIGNIFICANT CHANGE UP (ref 0.2–1.2)
BILIRUB UR-MCNC: NEGATIVE — SIGNIFICANT CHANGE UP
BUN SERPL-MCNC: 23 MG/DL — SIGNIFICANT CHANGE UP (ref 7–23)
BUN SERPL-MCNC: 23 MG/DL — SIGNIFICANT CHANGE UP (ref 7–23)
CALCIUM SERPL-MCNC: 9 MG/DL — SIGNIFICANT CHANGE UP (ref 8.4–10.5)
CALCIUM SERPL-MCNC: 9.1 MG/DL — SIGNIFICANT CHANGE UP (ref 8.4–10.5)
CCP IGG SERPL-ACNC: <8 U/ML — SIGNIFICANT CHANGE UP
CHLORIDE SERPL-SCNC: 87 MMOL/L — LOW (ref 96–108)
CHLORIDE SERPL-SCNC: 96 MMOL/L — SIGNIFICANT CHANGE UP (ref 96–108)
CHOLEST SERPL-MCNC: 75 MG/DL — SIGNIFICANT CHANGE UP
CO2 SERPL-SCNC: 21 MMOL/L — LOW (ref 22–31)
CO2 SERPL-SCNC: 23 MMOL/L — SIGNIFICANT CHANGE UP (ref 22–31)
COLOR SPEC: YELLOW — SIGNIFICANT CHANGE UP
CORTIS SERPL-MCNC: 20.2 UG/DL — SIGNIFICANT CHANGE UP
CREAT ?TM UR-MCNC: 39 MG/DL — SIGNIFICANT CHANGE UP
CREAT ?TM UR-MCNC: 39 MG/DL — SIGNIFICANT CHANGE UP
CREAT SERPL-MCNC: 2.29 MG/DL — HIGH (ref 0.5–1.3)
CREAT SERPL-MCNC: 2.34 MG/DL — HIGH (ref 0.5–1.3)
CYSTATIN C SERPL-MCNC: 2.5 MG/L — HIGH (ref 0.82–1.52)
DIFF PNL FLD: ABNORMAL
DSDNA AB SER-ACNC: 1 IU/ML — SIGNIFICANT CHANGE UP
EGFR: 28 ML/MIN/1.73M2 — LOW
EGFR: 28 ML/MIN/1.73M2 — LOW
EOSINOPHIL # BLD AUTO: 0.06 K/UL — SIGNIFICANT CHANGE UP (ref 0–0.5)
EOSINOPHIL NFR BLD AUTO: 0.8 % — SIGNIFICANT CHANGE UP (ref 0–6)
ESTIMATED AVERAGE GLUCOSE: 123 MG/DL — HIGH (ref 68–114)
GFR/BSA.PRED SERPLBLD CYS-BASED-ARV: 22 ML/MIN/1.73M2 — LOW
GLUCOSE SERPL-MCNC: 112 MG/DL — HIGH (ref 70–99)
GLUCOSE SERPL-MCNC: 135 MG/DL — HIGH (ref 70–99)
GLUCOSE UR QL: 100 MG/DL
HAPTOGLOB SERPL-MCNC: 184 MG/DL — SIGNIFICANT CHANGE UP (ref 34–200)
HCT VFR BLD CALC: 30.8 % — LOW (ref 39–50)
HCV RNA SPEC NAA+PROBE-LOG IU: SIGNIFICANT CHANGE UP
HCV RNA SPEC NAA+PROBE-LOG IU: SIGNIFICANT CHANGE UP LOGIU/ML
HDLC SERPL-MCNC: 40 MG/DL — LOW
HGB BLD-MCNC: 10 G/DL — LOW (ref 13–17)
IGA FLD-MCNC: 176 MG/DL — SIGNIFICANT CHANGE UP (ref 84–499)
IGG FLD-MCNC: 801 MG/DL — SIGNIFICANT CHANGE UP (ref 610–1660)
IGM SERPL-MCNC: 62 MG/DL — SIGNIFICANT CHANGE UP (ref 35–242)
IMM GRANULOCYTES NFR BLD AUTO: 0.4 % — SIGNIFICANT CHANGE UP (ref 0–0.9)
KAPPA LC SER QL IFE: 17.14 MG/DL — HIGH (ref 0.33–1.94)
KAPPA/LAMBDA FREE LIGHT CHAIN RATIO, SERUM: 8.49 RATIO — HIGH (ref 0.26–1.65)
KETONES UR-MCNC: NEGATIVE MG/DL — SIGNIFICANT CHANGE UP
LAMBDA LC SER QL IFE: 2.02 MG/DL — SIGNIFICANT CHANGE UP (ref 0.57–2.63)
LDH SERPL L TO P-CCNC: 179 U/L — SIGNIFICANT CHANGE UP (ref 50–242)
LDLC SERPL DIRECT ASSAY-MCNC: 17 MG/DL — SIGNIFICANT CHANGE UP
LEUKOCYTE ESTERASE UR-ACNC: NEGATIVE — SIGNIFICANT CHANGE UP
LIPID PNL WITH DIRECT LDL SERPL: 15 MG/DL — SIGNIFICANT CHANGE UP
LYMPHOCYTES # BLD AUTO: 1.09 K/UL — SIGNIFICANT CHANGE UP (ref 1–3.3)
LYMPHOCYTES # BLD AUTO: 13.7 % — SIGNIFICANT CHANGE UP (ref 13–44)
MAGNESIUM SERPL-MCNC: 2.4 MG/DL — SIGNIFICANT CHANGE UP (ref 1.6–2.6)
MCHC RBC-ENTMCNC: 30.5 PG — SIGNIFICANT CHANGE UP (ref 27–34)
MCHC RBC-ENTMCNC: 32.5 G/DL — SIGNIFICANT CHANGE UP (ref 32–36)
MCV RBC AUTO: 93.9 FL — SIGNIFICANT CHANGE UP (ref 80–100)
MONOCYTES # BLD AUTO: 0.58 K/UL — SIGNIFICANT CHANGE UP (ref 0–0.9)
MONOCYTES NFR BLD AUTO: 7.3 % — SIGNIFICANT CHANGE UP (ref 2–14)
NEUTROPHILS # BLD AUTO: 6.18 K/UL — SIGNIFICANT CHANGE UP (ref 1.8–7.4)
NEUTROPHILS NFR BLD AUTO: 77.5 % — HIGH (ref 43–77)
NITRITE UR-MCNC: NEGATIVE — SIGNIFICANT CHANGE UP
NON HDL CHOLESTEROL: 35 MG/DL — SIGNIFICANT CHANGE UP
NRBC # BLD: 0 /100 WBCS — SIGNIFICANT CHANGE UP (ref 0–0)
OSMOLALITY UR: 245 MOSM/KG — LOW (ref 300–900)
OSMOLALITY UR: 262 MOSM/KG — LOW (ref 300–900)
PH UR: 6 — SIGNIFICANT CHANGE UP (ref 5–8)
PHOSPHATE SERPL-MCNC: 3.1 MG/DL — SIGNIFICANT CHANGE UP (ref 2.5–4.5)
PLATELET # BLD AUTO: 154 K/UL — SIGNIFICANT CHANGE UP (ref 150–400)
POTASSIUM SERPL-MCNC: 3.3 MMOL/L — LOW (ref 3.5–5.3)
POTASSIUM SERPL-MCNC: 4.3 MMOL/L — SIGNIFICANT CHANGE UP (ref 3.5–5.3)
POTASSIUM SERPL-SCNC: 3.3 MMOL/L — LOW (ref 3.5–5.3)
POTASSIUM SERPL-SCNC: 4.3 MMOL/L — SIGNIFICANT CHANGE UP (ref 3.5–5.3)
POTASSIUM UR-SCNC: 8 MMOL/L — SIGNIFICANT CHANGE UP
PROT ?TM UR-MCNC: 276 MG/DL — HIGH (ref 0–12)
PROT SERPL-MCNC: 5.2 G/DL — LOW (ref 6–8.3)
PROT SERPL-MCNC: 5.5 G/DL — LOW (ref 6–8.3)
PROT UR-MCNC: >=1000 MG/DL
PROT/CREAT UR-RTO: 7.1 RATIO — HIGH (ref 0–0.2)
RBC # BLD: 3.28 M/UL — LOW (ref 4.2–5.8)
RBC # FLD: 15 % — HIGH (ref 10.3–14.5)
RF+CCP IGG SER-IMP: NEGATIVE — SIGNIFICANT CHANGE UP
SODIUM SERPL-SCNC: 121 MMOL/L — LOW (ref 135–145)
SODIUM SERPL-SCNC: 127 MMOL/L — LOW (ref 135–145)
SODIUM UR-SCNC: 45 MMOL/L — SIGNIFICANT CHANGE UP
SODIUM UR-SCNC: 51 MMOL/L — SIGNIFICANT CHANGE UP
SP GR SPEC: 1.02 — SIGNIFICANT CHANGE UP (ref 1–1.03)
T4 FREE+ TSH PNL SERPL: 2.65 UIU/ML — SIGNIFICANT CHANGE UP (ref 0.27–4.2)
TRIGL SERPL-MCNC: 107 MG/DL — SIGNIFICANT CHANGE UP
TROPONIN T, HIGH SENSITIVITY RESULT: 176 NG/L — CRITICAL HIGH (ref 0–51)
URATE SERPL-MCNC: 5.2 MG/DL — SIGNIFICANT CHANGE UP (ref 3.4–8.8)
UROBILINOGEN FLD QL: 0.2 MG/DL — SIGNIFICANT CHANGE UP (ref 0.2–1)
UUN UR-MCNC: 233 MG/DL — SIGNIFICANT CHANGE UP
WBC # BLD: 7.96 K/UL — SIGNIFICANT CHANGE UP (ref 3.8–10.5)
WBC # FLD AUTO: 7.96 K/UL — SIGNIFICANT CHANGE UP (ref 3.8–10.5)

## 2024-12-04 PROCEDURE — 99232 SBSQ HOSP IP/OBS MODERATE 35: CPT | Mod: GC

## 2024-12-04 PROCEDURE — 99222 1ST HOSP IP/OBS MODERATE 55: CPT

## 2024-12-04 PROCEDURE — 93880 EXTRACRANIAL BILAT STUDY: CPT | Mod: 26

## 2024-12-04 PROCEDURE — 76775 US EXAM ABDO BACK WALL LIM: CPT | Mod: 26,59

## 2024-12-04 PROCEDURE — 93970 EXTREMITY STUDY: CPT | Mod: 26

## 2024-12-04 PROCEDURE — 93976 VASCULAR STUDY: CPT | Mod: 26

## 2024-12-04 PROCEDURE — 99233 SBSQ HOSP IP/OBS HIGH 50: CPT | Mod: GC

## 2024-12-04 PROCEDURE — 99233 SBSQ HOSP IP/OBS HIGH 50: CPT

## 2024-12-04 RX ORDER — DESMOPRESSIN ACETATE 4 UG/ML
0.2 SOLUTION INTRAVENOUS ONCE
Refills: 0 | Status: DISCONTINUED | OUTPATIENT
Start: 2024-12-04 | End: 2024-12-05

## 2024-12-04 RX ORDER — LIPASE/PROTEASE/AMYLASE 8K-30K-30K
3 TABLET ORAL
Refills: 0 | Status: DISCONTINUED | OUTPATIENT
Start: 2024-12-04 | End: 2024-12-13

## 2024-12-04 RX ORDER — ACETAMINOPHEN 500MG 500 MG/1
650 TABLET, COATED ORAL EVERY 6 HOURS
Refills: 0 | Status: DISCONTINUED | OUTPATIENT
Start: 2024-12-04 | End: 2024-12-13

## 2024-12-04 RX ORDER — POTASSIUM CHLORIDE 600 MG/1
40 TABLET, EXTENDED RELEASE ORAL ONCE
Refills: 0 | Status: COMPLETED | OUTPATIENT
Start: 2024-12-04 | End: 2024-12-04

## 2024-12-04 RX ORDER — FLUTICASONE PROPIONATE 50 MCG
1 SPRAY, SUSPENSION NASAL
Refills: 0 | Status: DISCONTINUED | OUTPATIENT
Start: 2024-12-04 | End: 2024-12-05

## 2024-12-04 RX ORDER — POTASSIUM CHLORIDE 600 MG/1
10 TABLET, EXTENDED RELEASE ORAL
Refills: 0 | Status: COMPLETED | OUTPATIENT
Start: 2024-12-04 | End: 2024-12-04

## 2024-12-04 RX ORDER — SODIUM CHLORIDE 3 G/100ML
500 INJECTION, SOLUTION INTRAVENOUS
Refills: 0 | Status: DISCONTINUED | OUTPATIENT
Start: 2024-12-04 | End: 2024-12-04

## 2024-12-04 RX ADMIN — Medication 5000 UNIT(S): at 06:36

## 2024-12-04 RX ADMIN — SODIUM CHLORIDE 4 MILLILITER(S): 9 INJECTION, SOLUTION INTRAMUSCULAR; INTRAVENOUS; SUBCUTANEOUS at 22:12

## 2024-12-04 RX ADMIN — PIPERACILLIN SODIUM AND TAZOBACTAM SODIUM 25 GRAM(S): 4; .5 INJECTION, POWDER, LYOPHILIZED, FOR SOLUTION INTRAVENOUS at 22:11

## 2024-12-04 RX ADMIN — Medication 2 MILLIGRAM(S): at 18:32

## 2024-12-04 RX ADMIN — Medication 5000 UNIT(S): at 14:13

## 2024-12-04 RX ADMIN — POTASSIUM CHLORIDE 100 MILLIEQUIVALENT(S): 600 TABLET, EXTENDED RELEASE ORAL at 14:16

## 2024-12-04 RX ADMIN — Medication 1 SPRAY(S): at 18:31

## 2024-12-04 RX ADMIN — TAMSULOSIN HYDROCHLORIDE 0.4 MILLIGRAM(S): 0.4 CAPSULE ORAL at 18:32

## 2024-12-04 RX ADMIN — POTASSIUM CHLORIDE 100 MILLIEQUIVALENT(S): 600 TABLET, EXTENDED RELEASE ORAL at 09:03

## 2024-12-04 RX ADMIN — Medication 5000 UNIT(S): at 01:04

## 2024-12-04 RX ADMIN — SODIUM CHLORIDE 4 MILLILITER(S): 9 INJECTION, SOLUTION INTRAMUSCULAR; INTRAVENOUS; SUBCUTANEOUS at 09:05

## 2024-12-04 RX ADMIN — POTASSIUM CHLORIDE 40 MILLIEQUIVALENT(S): 600 TABLET, EXTENDED RELEASE ORAL at 09:05

## 2024-12-04 RX ADMIN — POTASSIUM CHLORIDE 100 MILLIEQUIVALENT(S): 600 TABLET, EXTENDED RELEASE ORAL at 16:26

## 2024-12-04 RX ADMIN — ACETAMINOPHEN 500MG 650 MILLIGRAM(S): 500 TABLET, COATED ORAL at 23:10

## 2024-12-04 RX ADMIN — Medication 5000 UNIT(S): at 22:11

## 2024-12-04 RX ADMIN — PIPERACILLIN SODIUM AND TAZOBACTAM SODIUM 25 GRAM(S): 4; .5 INJECTION, POWDER, LYOPHILIZED, FOR SOLUTION INTRAVENOUS at 14:16

## 2024-12-04 RX ADMIN — IPRATROPIUM BROMIDE AND ALBUTEROL SULFATE 3 MILLILITER(S): 2.5; .5 SOLUTION RESPIRATORY (INHALATION) at 22:12

## 2024-12-04 RX ADMIN — ACETAMINOPHEN 500MG 650 MILLIGRAM(S): 500 TABLET, COATED ORAL at 14:12

## 2024-12-04 RX ADMIN — Medication 2 DROP(S): at 16:59

## 2024-12-04 RX ADMIN — Medication 81 MILLIGRAM(S): at 11:55

## 2024-12-04 RX ADMIN — Medication 2 PUFF(S): at 12:03

## 2024-12-04 RX ADMIN — POTASSIUM CHLORIDE 100 MILLIEQUIVALENT(S): 600 TABLET, EXTENDED RELEASE ORAL at 04:40

## 2024-12-04 RX ADMIN — Medication 5 MILLIGRAM(S): at 11:56

## 2024-12-04 RX ADMIN — POTASSIUM CHLORIDE 40 MILLIEQUIVALENT(S): 600 TABLET, EXTENDED RELEASE ORAL at 01:03

## 2024-12-04 RX ADMIN — PIPERACILLIN SODIUM AND TAZOBACTAM SODIUM 25 GRAM(S): 4; .5 INJECTION, POWDER, LYOPHILIZED, FOR SOLUTION INTRAVENOUS at 06:35

## 2024-12-04 RX ADMIN — ACETAMINOPHEN 500MG 650 MILLIGRAM(S): 500 TABLET, COATED ORAL at 22:33

## 2024-12-04 RX ADMIN — IPRATROPIUM BROMIDE AND ALBUTEROL SULFATE 3 MILLILITER(S): 2.5; .5 SOLUTION RESPIRATORY (INHALATION) at 09:05

## 2024-12-04 RX ADMIN — FLUTICASONE PROPIONATE AND SALMETEROL XINAFOATE 1 DOSE(S): 45; 21 AEROSOL, METERED RESPIRATORY (INHALATION) at 10:36

## 2024-12-04 RX ADMIN — POTASSIUM CHLORIDE 40 MILLIEQUIVALENT(S): 600 TABLET, EXTENDED RELEASE ORAL at 06:35

## 2024-12-04 RX ADMIN — POTASSIUM CHLORIDE 100 MILLIEQUIVALENT(S): 600 TABLET, EXTENDED RELEASE ORAL at 01:03

## 2024-12-04 RX ADMIN — IPRATROPIUM BROMIDE AND ALBUTEROL SULFATE 3 MILLILITER(S): 2.5; .5 SOLUTION RESPIRATORY (INHALATION) at 16:26

## 2024-12-04 RX ADMIN — PIPERACILLIN SODIUM AND TAZOBACTAM SODIUM 25 GRAM(S): 4; .5 INJECTION, POWDER, LYOPHILIZED, FOR SOLUTION INTRAVENOUS at 01:03

## 2024-12-04 RX ADMIN — ACETAMINOPHEN 500MG 650 MILLIGRAM(S): 500 TABLET, COATED ORAL at 15:10

## 2024-12-04 RX ADMIN — Medication 2 CAPSULE(S): at 18:33

## 2024-12-04 RX ADMIN — SIROLIMUS 2 MILLIGRAM(S): 1 TABLET ORAL at 06:36

## 2024-12-04 RX ADMIN — Medication 2 MILLIGRAM(S): at 11:57

## 2024-12-04 RX ADMIN — SODIUM CHLORIDE 30 MILLILITER(S): 3 INJECTION, SOLUTION INTRAVENOUS at 10:14

## 2024-12-04 RX ADMIN — FLUTICASONE PROPIONATE AND SALMETEROL XINAFOATE 1 DOSE(S): 45; 21 AEROSOL, METERED RESPIRATORY (INHALATION) at 22:13

## 2024-12-04 RX ADMIN — IPRATROPIUM BROMIDE AND ALBUTEROL SULFATE 3 MILLILITER(S): 2.5; .5 SOLUTION RESPIRATORY (INHALATION) at 04:44

## 2024-12-04 RX ADMIN — LIDOCAINE 1 PATCH: 40 CREAM TOPICAL at 07:00

## 2024-12-04 RX ADMIN — POTASSIUM CHLORIDE 100 MILLIEQUIVALENT(S): 600 TABLET, EXTENDED RELEASE ORAL at 02:59

## 2024-12-04 NOTE — PROGRESS NOTE ADULT - SUBJECTIVE AND OBJECTIVE BOX
*****INCOMPLETE NOTE*****    INTERVAL HPI/OVERNIGHT EVENTS: katz placed 14 Fr,  cc (per urology no TOV for 4-5 days); re-instated spiriva per pulm note; started creon 36k units, RVP + coronavirus. Went to [facility] but couldn't get it because of drips, K 2.6 -> gave 40 PO x2 + 10 IV x3, Na 122    SUBJECTIVE: Patient seen and examined at bedside, resting comfortably in bed, and does not appear to be in any acute distress. Patient reports    Vital Signs Last 24 Hrs  T(C): 36.7 (04 Dec 2024 05:58), Max: 36.7 (04 Dec 2024 05:58)  T(F): 98.1 (04 Dec 2024 05:58), Max: 98.1 (04 Dec 2024 05:58)  HR: 80 (04 Dec 2024 04:50) (66 - 96)  BP: 162/95 (04 Dec 2024 04:50) (119/74 - 183/149)  BP(mean): 123 (04 Dec 2024 04:50) (91 - 162)  RR: 15 (04 Dec 2024 04:50) (15 - 17)  SpO2: 97% (04 Dec 2024 04:50) (96% - 99%)    Parameters below as of 04 Dec 2024 04:50  Patient On (Oxygen Delivery Method): nasal cannula  O2 Flow (L/min): 2      PHYSICAL EXAM:  General: in no acute distress  Eyes: EOMI intact bilaterally. Anicteric sclerae, moist conjunctivae  HENT: Moist mucous membranes  Neck: Trachea midline, supple  Lungs: CTA B/L. No wheezes, rales, or rhonchi  Cardiovascular: RRR. No murmurs, rubs, or gallops  Abdomen: Soft, non-tender non-distended; No rebound or guarding  Extremities: WWP, No clubbing, cyanosis or edema  Neurological: Alert and oriented  Skin: Warm and dry. No obvious rash     LABS:                        10.6   11.32 )-----------( 138      ( 03 Dec 2024 06:46 )             31.6     12-03    122[L]  |  85[L]  |  24[H]  ----------------------------<  112[H]  2.6[LL]   |  25  |  2.31[H]    Ca    8.9      03 Dec 2024 23:25  Phos  3.9     12-03  Mg     2.6     12-03    TPro  5.9[L]  /  Alb  3.1[L]  /  TBili  0.4  /  DBili  x   /  AST  28  /  ALT  12  /  AlkPhos  139[H]  12-03   INTERVAL HPI/OVERNIGHT EVENTS: katz placed 14 Fr,  cc (per urology no TOV for 4-5 days); re-instated spiriva per pulm note; started creon 36k units, RVP + coronavirus. Went to MR but couldn't get it because of drips, K 2.6 -> gave 40 PO x2 + 10 IV x3, Na 122    SUBJECTIVE: Patient seen and examined at bedside, resting comfortably in bed, and does not appear to be in any acute distress. Patient reports    Vital Signs Last 24 Hrs  T(C): 36.7 (04 Dec 2024 05:58), Max: 36.7 (04 Dec 2024 05:58)  T(F): 98.1 (04 Dec 2024 05:58), Max: 98.1 (04 Dec 2024 05:58)  HR: 80 (04 Dec 2024 04:50) (66 - 96)  BP: 162/95 (04 Dec 2024 04:50) (119/74 - 183/149)  BP(mean): 123 (04 Dec 2024 04:50) (91 - 162)  RR: 15 (04 Dec 2024 04:50) (15 - 17)  SpO2: 97% (04 Dec 2024 04:50) (96% - 99%)    Parameters below as of 04 Dec 2024 04:50  Patient On (Oxygen Delivery Method): nasal cannula  O2 Flow (L/min): 2      PHYSICAL EXAM:  General: in no acute distress  Eyes: EOMI intact bilaterally. Anicteric sclerae,   HENT: dry mucous membranes  Neck: Trachea midline, supple  Lungs: CTA B/L. No wheezes, rales, or rhonchi  Cardiovascular: RRR. No murmurs, rubs, or gallops  Abdomen: Soft, non-tender non-distended; No rebound or guarding  Extremities: WWP, No clubbing, cyanosis or edema  Neurological: Alert and oriented  Skin: Warm and dry. No obvious rash     LABS:                        10.6   11.32 )-----------( 138      ( 03 Dec 2024 06:46 )             31.6     12-03    122[L]  |  85[L]  |  24[H]  ----------------------------<  112[H]  2.6[LL]   |  25  |  2.31[H]    Ca    8.9      03 Dec 2024 23:25  Phos  3.9     12-03  Mg     2.6     12-03    TPro  5.9[L]  /  Alb  3.1[L]  /  TBili  0.4  /  DBili  x   /  AST  28  /  ALT  12  /  AlkPhos  139[H]  12-03

## 2024-12-04 NOTE — PROGRESS NOTE ADULT - SUBJECTIVE AND OBJECTIVE BOX
PULMONARY CONSULT SERVICE FOLLOW-UP NOTE    INTERVAL HPI:  Reviewed chart and overnight events; patient seen and examined. Feels better today    MEDICATIONS:  Pulmonary:  albuterol/ipratropium for Nebulization 3 milliLiter(s) Nebulizer every 6 hours  fluticasone propionate/ salmeterol 100-50 MICROgram(s) Diskus 1 Dose(s) Inhalation two times a day  sodium chloride 3%  Inhalation 4 milliLiter(s) Inhalation every 12 hours  tiotropium 2.5 MICROgram(s) Inhaler 2 Puff(s) Inhalation daily    Antimicrobials:  piperacillin/tazobactam IVPB.. 4.5 Gram(s) IV Intermittent every 8 hours    Anticoagulants:  aspirin  chewable 81 milliGRAM(s) Oral daily  heparin   Injectable 5000 Unit(s) SubCutaneous every 8 hours    Cardiac:      Allergies    NSAIDs (Pruritus)  Aleve (Unknown)    Intolerances        Vital Signs Last 24 Hrs  T(C): 36.5 (04 Dec 2024 13:47), Max: 36.7 (04 Dec 2024 05:58)  T(F): 97.7 (04 Dec 2024 13:47), Max: 98.1 (04 Dec 2024 05:58)  HR: 92 (04 Dec 2024 15:05) (73 - 92)  BP: 158/94 (04 Dec 2024 15:05) (141/87 - 162/95)  BP(mean): 118 (04 Dec 2024 15:05) (107 - 130)  RR: 16 (04 Dec 2024 15:05) (15 - 16)  SpO2: 98% (04 Dec 2024 15:05) (95% - 99%)    Parameters below as of 04 Dec 2024 15:05  Patient On (Oxygen Delivery Method): nasal cannula w/ humidification  O2 Flow (L/min): 2      12-03 @ 07:01  -  12-04 @ 07:00  --------------------------------------------------------  IN: 0 mL / OUT: 2550 mL / NET: -2550 mL    12-04 @ 07:01  -  12-04 @ 18:06  --------------------------------------------------------  IN: 580 mL / OUT: 650 mL / NET: -70 mL          PHYSICAL EXAM:  Constitutional: well-appearing, in no apparent respiratory distress, frail and cachectic  HEENT: MMM  Neck: supple   Cardiovascular: +S1/S2, Regular rate and rhythm, no murmurs appreciated   Respiratory: CTABL  Gastrointestinal: soft   Extremities: no edema, clubbing or cyanosis  Vascular: 2+ radial pulses B/L  Neurological: awake and alert; oriented x3    LABS:      CBC Full  -  ( 04 Dec 2024 05:30 )  WBC Count : 7.96 K/uL  RBC Count : 3.28 M/uL  Hemoglobin : 10.0 g/dL  Hematocrit : 30.8 %  Platelet Count - Automated : 154 K/uL  Mean Cell Volume : 93.9 fl  Mean Cell Hemoglobin : 30.5 pg  Mean Cell Hemoglobin Concentration : 32.5 g/dL  Auto Neutrophil # : 6.18 K/uL  Auto Lymphocyte # : 1.09 K/uL  Auto Monocyte # : 0.58 K/uL  Auto Eosinophil # : 0.06 K/uL  Auto Basophil # : 0.02 K/uL  Auto Neutrophil % : 77.5 %  Auto Lymphocyte % : 13.7 %  Auto Monocyte % : 7.3 %  Auto Eosinophil % : 0.8 %  Auto Basophil % : 0.3 %    12-04    127[L]  |  96  |  23  ----------------------------<  135[H]  4.3   |  21[L]  |  2.29[H]    Ca    9.1      04 Dec 2024 15:39  Phos  3.1     12-04  Mg     2.4     12-04    TPro  5.5[L]  /  Alb  2.7[L]  /  TBili  0.3  /  DBili  x   /  AST  21  /  ALT  11  /  AlkPhos  109  12-04    PT/INR - ( 03 Dec 2024 00:02 )   PT: 13.3 sec;   INR: 1.14          PTT - ( 03 Dec 2024 00:02 )  PTT:33.5 sec        RADIOLOGY & ADDITIONAL STUDIES:  reviewed external records

## 2024-12-04 NOTE — SWALLOW FEES ASSESSMENT ADULT - ORAL PHASE
For thin and mildly thick liquids. SLP attempts a oral bolus hold cue but this does not stop premature spill/Uncontrolled bolus/spillover in hypopharynx

## 2024-12-04 NOTE — SWALLOW FEES ASSESSMENT ADULT - DIAGNOSTIC IMPRESSIONS
Pt p/w a mild-moderate oropharyngeal dysphagia characterized by poor oral bolus control with premature spill deep into the pharynx and into the airway before the swallows of thin and mildly thick liquid cup sips as well as delayed and/or incomplete laryngeal vestibular closure resulting in airway invasion/aspiration during liquid swallows. There is no airway invasion seen with purees or soft solids. Aspiration of liquids is eliminated via bolus control as there is no aspiration seen with 5ml (spoonful) amounts of thin liquids.

## 2024-12-04 NOTE — SWALLOW FEES ASSESSMENT ADULT - COMMENTS
Pt was seen for follow up prior to this assessment. Pt p/w with proper alertness and ability to take PO trials. Pt p/w coughing post thin liquid sips but no overt s/s of airway invasion seen with puree or mildly thick liquids. FEES recommended. Risks, benefits, and alternatives to this study were reviewed with patient and his wife. Verbal consent provided. Flexible endoscope passed transnasally to further assess swallow anatomy and physiology. Patient tolerated the passing of the scope and its presence.

## 2024-12-04 NOTE — SWALLOW FEES ASSESSMENT ADULT - PRELIMINARY ENDOSCOPIC EXAMINATIONS
Pt coughing in response to aspiration of secretions/thick phlegm/Baseline aspiration of secretions/Baseline penetration of secretions

## 2024-12-04 NOTE — PROGRESS NOTE ADULT - ASSESSMENT
WANDER VAZQUEZ is a 79y year old Male with a PMHx significant for hepatitis C s/p liver transplant in 3/2019 for cryptogenic cirrhosis (on sirolimus for 5 years), unspecified autonomic nervous system disorder w/ hx of orthostasis, COPD, Mycobacterium avium complex lung disease, chronic anemia, amaurosis fugax, Crohn's disease, BPH, and unspecified pancreatic insufficiency. Patient presented to the ED on 12/2 after having fallen at home with a head-strike, without loss of consciousness. Found to be hyponatremic to 118 and found to have hypertensive urgency on presentation. Admitted for further medical management of blood pressure and hyponatremia.     NEURO:  #Metabolic Encephalopathy (RESOLVED)  Patient presented to the ED with AMS in the setting of hyponatremia and hypertensive urgency (-240). AMS improved to baseline A&Ox3 w/blood pressure control. AMS likely 2/2 cerebral edema in the setting of hypertensive urgency.     #Concern for Stroke  Stroke called in ED for AMS and apparent facial droop. Pt states he felt dizzy prior to him falling in the shower.   NIHSS 4, notable for LUE/LLE dysmetria.   CT brain, CTA large vessel study showing no acute hemorrhages or occlusions. Low concern for stroke given improved AMS and metabolic derangements   - Stroke consulted, appreciate recs       - repeat imaging CTH or MRI if symptoms do not improve    CARDIOVASCULAR:  #Hypertensive Urgency with Encephalopathy (IMPROVING)  Patient presented to ED with blood pressure 200/127. S/p nicardipine gtt and lasix 60mg IV in ED. Blood pressure lowered by presentation to floor to 149/92. No longer encephalopathic. Elevated blood pressure could be 2/2 pain w/ head strike.   - continue to monitor blood pressure   /p IV Labetol 10mg x1, ctm BP.    #Troponinemia   Patient presented with an elevated troponin to 115. Tachycardic on presentation. EKG with no ischemic changes, Bedside POCUS showing no wall motion abnormalities. Elevated troponin in the setting of demand ischemia and CKD.    - trend troponin to peak    #HFmREF  [facility] my chart accessed on daughters phone  - Myocardial SPECT-  LVEF 44%, EKG- SR, rare PVC (11/2024)  - Tc-99 PYP amyloid scan- negative for TTR amyloid (10/29/2024)  - US Heart- small pericardial effusion (9/5/2024)  - pRO-bnp-2080 (9/22/2024)  Patient with elevated BNP to 22,740. Patient wife reports hx of elevated BNP to 35k. Bedside POCUS showing an EF of ˜45%, no wall motion abnormalities, w/ dilated IVC on echo. Patient is s/p lasix 60mg IV in ED. Follows at [facility] cardiology.     - Strict Is&Os   - f/u formal TTE  - hold on additional diuresis for now    #Hypertensive emergency  p/w fall w/ garbled speech w/ /140 in the field and 200/127 in ED, managed w/ cardine drip initially and currently on labetolol IV PRN.    [facility] my chart accessed on [employer] phone  - aldosterone <1, plasma renin 0.511    - Reduce BP by 25% IN 24 hrs  - ctm vitals  - fu US Abdomen w/ doppler to r/o SUKHJINDER,   - fu Plasma renin activity and aldosterone level to r/o hyperaldosteronism    PULMONARY:  #COPD  #MAC  # R/O lung malignancy  #R/O active TB  Presentation: iWOB, recent sick contact: wife (ROBI), BiPAP ---->NC. Home meds: Advair, Spiriva, hypertonic saline. Does not use home O2.   CT showing b/l cavitary+solid lung lesion concerning for primary lung malignancy, RUL s/p wedge resection, cannot exclude TB or fungal etiology w/ small left pleural effusion and Diffuse interstitial pulmonary edema.    [facility] my chart accessed on [employer] phone  -11/21/2024- Sputum AFB smear+, c/s Mycobacterium abscessus+ (also positive in june and july 2024)  - 07/02/2024- candida albicans+  - 9/5/2024- V/Q scan- low probability of PE  - 05/2024- Histoplasma neg, aspergillus neg, fungitell neg  - 3/2024- CT Chest wo contrast: waxing and waning nodular opacities b/l w/ enlarging ALEC 1.8cm nodule, RUL cavity stable and consistent with Chronic MAC.   - Treated for Mac for 8 weeks in summer 2023 but d/c'd given side effects mary anne with rifampin including diarrhea and orthostasis which are still ongoing.    - wean O2 as tolerated for a goal O2 saturation 82-92% given COPD  - continue to monitor respiratory status  - Pulmonology consult to establish dx: infectious vs autoimmune vs malignancy etiology  - fu sputum culture, induced sputum culture, strep PNA, Legionella, RSV, MRSA, Galactomannan, fungitell  - fu autoimune hairston to r/o GPA, sarcoid  - c/w Vanc+azithromycin_ zosyn 4.5g to cover NADIR  - fu pulmonology recs  - fu MRI Brain to r/o brain mets    #Pulmonary Nodules  CT chest with a preliminary read showing multiple cavitary and soft tissue mass lesions in bilateral lungs.   - f/u outpatient for workup  - PET neg for Cancer    #Nasal wall ulcer  2017 BX- SCC+    RENAL:  #Hyponatremia  Na+ 118 on presentation. Normal sodium in early November. Serum osmolality 260, urine osmolality 283, urine sodium 57. Hypovolemic on physical exam: dry MM and decreased skin turgor.  Etiology: Could be SIADH since Na improved from 118 to 121 w/ fluid restriction, given hypovolemic physical exam, CSW is a possibility given hypovolemic physical exam findings and p/w fall w/ headstrike.    - fluid restriction of 1L  - continue to monitor w/ Q4H BMP and q12h urine studies  - fu nephrology recs  - fu TSH and AM cortisol  - strict I and O    #Hypokalemia  #Hypomagnesemia  Likely in the setting of poor PO intake  Continue to monitor, replete as needed    #CKD   Patient with a hx of CKD, Hep C, Liver tx. Not currently getting dialysis. Baseline Cr 2.74 in early November.   UA- Protein>1000, RBC-26 represents a GN w/ nephritic range proteinuria    [facility] my chart accessed on daughters phone  - 11/21/2024 KFLC-272.2, LFLC- 28.4. K/L- 9.58, m-spike+, IgG-1089, iGa-211, iGm-72  - 11/21/2024  Uric acid-  6.7, LDH- 185  - 6/3/2024- Renal biopsy-                          - H&E- (1) segmental and global glomerulosclerosis, (2) diffuse global glomerulosclerosis (63%), w/ moderate to marked tubular atrophy & interstitial fibrosis w/ marked arterial and arteriolar sclerosis                         - LM - 16/24 glomeruli: globally sclerotic or show chronic hypoperfusion, 6/24: mild to moderate mesangial prominence, 2/24: segmental sclerosis, several bhavik show segmental "double contour" formation                         - IF - 4/6 globally sclerotic, Igm, Kappa, Lambda: trace granular mesagngial staining, IgG, IgA, C1q: no stain, 1+ C3- along tubular BM and in vessels, Casts- bitypic for both light chains. albumin neg, Fibrin neg                         - EM- 1/2 segmental sclerosis w/ subepithelial reparative changes as well as globular deposits consistent w/ hyaline w/ focal foot effacement.  GBM thickness mildly increaed, Subendothelial space:  no electron lucent expansion, No dense deposits. Mesangium: small electron dense deposits. Foci of mesangial interposition. No amyloid.   - 5/7/2024- iPTH- 112  - (3/2024) 24H Urine protein -1710, 75.2% paraprot excretion/24h  - 2/27/2024- 24H urine- 5-HIAA- 3.6 (WNL), Metanephrines- 35 (), normetanephrine- 122 (156-729), MPO neg, PR3 neg    - fu GN workup: Autoimmune (LAWANDA, ANCA, C3, C4, Cryoglobulin, anti-GBM ds, dsDNA), infection (HIV, Hep B, Hep C),   - continue to monitor  - avoid nephrotoxic agents       GI/:  #Liver transplant  S/p liver transplant in 3/2019 for cryptogenic cirrhosis. Home sirolimus 2mg QD.   - c/w sirolimus 2mg QD    #Unspecified Pancreatic Insufficiency   #Diarrhea  Elevated Alk Phos to 148 on admission. Likely elevated in the setting of pancreatic insufficiency.     [facility] my chart accessed on daughters phone  12/22/2023 MRI/MRCP abdomen w/ wo IV con: few pancreatic cystic lesions w/ indolent growth 1.6cm within pancreatic tail. no suspicious internal features, no main uct dilation. No solid mass  - C-Peptide- 10.4 (range 1.1-4.4)  - Fructosamine: 338 (0-285)  - low fecal elastatse 10/2023  - CMV+ 10/2023  - f/u GGT   - c/w home Creon   - c/w home loperamide     #NPO   Patient did not pass bedside dysphagia overnight 12/2-12/3. Patient NPO for now.   - Will reassess in AM    HEME:  #Chronic Anemia   Hemoglobin 11.8 on presentation. Could be 2/2 anemia of chronic disease or iron deficiency given CKD.   - continue to monitor    #MGUS  - elevated FLC w/ negative BM bx (7/2023)    ID:  #SIRS  #Leukocytosis  Met SIRS with tachypnea, tachycardia, and leukocytosis. Afebrile. Did have recent illness of URI. Low suspicion for bacterial infection at this time.   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 (Range, 2-96)                     PPX:  Fluids: fluid restriction of < 1L  Electrolytes: replete as needed  Nutrition: Diet, NPO:   Except Medications (12-03-24 @ 04:43) [Active]  PPI ppx: home famotidine 20mg BID  Dvt ppx: heparin 5000units subQ Q8H  Activity: fall precautions   Code Status: full code   WANDER VAZQUEZ is a 79y year old Male with a PMHx significant for hepatitis C s/p liver transplant in 3/2019 for cryptogenic cirrhosis (on sirolimus for 5 years), unspecified autonomic nervous system disorder w/ hx of orthostasis, COPD, Mycobacterium avium complex lung disease, chronic anemia, amaurosis fugax, Crohn's disease, BPH, and unspecified pancreatic insufficiency. Patient presented to the ED on 12/2 after having fallen at home with a head-strike, without loss of consciousness. Found to be hyponatremic to 118 and found to have hypertensive urgency on presentation. Admitted for further medical management of blood pressure and hyponatremia.     NEURO:  #Metabolic Encephalopathy (RESOLVED)  Patient presented to the ED with AMS in the setting of hyponatremia and hypertensive urgency (-240). AMS improved to baseline A&Ox3 w/blood pressure control. AMS likely 2/2 cerebral edema in the setting of hypertensive urgency.     #Concern for Stroke  Stroke called in ED for AMS and apparent facial droop. Pt states he felt dizzy prior to him falling in the shower.   NIHSS 4, notable for LUE/LLE dysmetria.   CT brain, CTA large vessel study showing no acute hemorrhages or occlusions. Low concern for stroke given improved AMS and metabolic derangements   - Stroke consulted, appreciate recs       - repeat imaging CTH or MRI if symptoms do not improve    CARDIOVASCULAR:  #Hypertensive Urgency with Encephalopathy (IMPROVING)  Patient presented to ED with blood pressure 200/127. S/p nicardipine gtt and lasix 60mg IV in ED. Blood pressure lowered by presentation to floor to 149/92. No longer encephalopathic. Elevated blood pressure could be 2/2 pain w/ head strike.   - continue to monitor blood pressure   /p IV Labetol 10mg x1, ctm BP.    #Troponinemia   Patient presented with an elevated troponin to 115. Tachycardic on presentation. EKG with no ischemic changes, Bedside POCUS showing no wall motion abnormalities. Elevated troponin in the setting of demand ischemia and CKD.    - trend troponin to peak    #HFmREF  [facility] my chart accessed on daughters phone  - Myocardial SPECT-  LVEF 44%, EKG- SR, rare PVC (11/2024)  - Tc-99 PYP amyloid scan- negative for TTR amyloid (10/29/2024)  - US Heart- small pericardial effusion (9/5/2024)  - pRO-bnp-2080 (9/22/2024)  Patient with elevated BNP to 22,740. Patient wife reports hx of elevated BNP to 35k. Bedside POCUS showing an EF of ˜45%, no wall motion abnormalities, w/ dilated IVC on echo. Patient is s/p lasix 60mg IV in ED. Follows at [facility] cardiology.     - Strict Is&Os   - f/u formal TTE  - hold on additional diuresis for now    #Hypertensive emergency  p/w fall w/ garbled speech w/ /140 in the field and 200/127 in ED, managed w/ cardine drip initially and currently on labetolol IV PRN.    [facility] my chart accessed on [employer] phone  - aldosterone <1, plasma renin 0.511    - Reduce BP by 25% IN 24 hrs  - ctm vitals  - fu US Abdomen w/ doppler to r/o SUKHJINDER,   - fu Plasma renin activity and aldosterone level to r/o hyperaldosteronism    PULMONARY:  #COPD  #MAC  # R/O lung malignancy  #R/O active TB  Presentation: iWOB, recent sick contact: wife (ROBI), BiPAP ---->NC. Home meds: Advair, Spiriva, hypertonic saline. Does not use home O2.   CT showing b/l cavitary+solid lung lesion concerning for primary lung malignancy, RUL s/p wedge resection, cannot exclude TB or fungal etiology w/ small left pleural effusion and Diffuse interstitial pulmonary edema.    [facility] my chart accessed on [employer] phone  -11/21/2024- Sputum AFB smear+, c/s Mycobacterium abscessus+ (also positive in june and july 2024)  - 07/02/2024- candida albicans+  - 9/5/2024- V/Q scan- low probability of PE  - 05/2024- Histoplasma neg, aspergillus neg, fungitell neg  - 3/2024- CT Chest wo contrast: waxing and waning nodular opacities b/l w/ enlarging ALEC 1.8cm nodule, RUL cavity stable and consistent with Chronic MAC.   - Treated for Mac for 8 weeks in summer 2023 but d/c'd given side effects mary anne with rifampin including diarrhea and orthostasis which are still ongoing.    - wean O2 as tolerated for a goal O2 saturation 82-92% given COPD  - continue to monitor respiratory status  - Pulmonology consult to establish dx: infectious vs autoimmune vs malignancy etiology  - fu sputum culture, induced sputum culture, strep PNA, Legionella, RSV, MRSA, Galactomannan, fungitell  - fu autoimune hairston to r/o GPA, sarcoid  - c/w Vanc+azithromycin_ zosyn 4.5g to cover NADIR  - fu pulmonology recs  - fu MRI Brain to r/o brain mets    #Pulmonary Nodules  CT chest with a preliminary read showing multiple cavitary and soft tissue mass lesions in bilateral lungs.   - f/u outpatient for workup  - PET neg for Cancer    #Nasal wall ulcer  2017 BX- SCC+    RENAL:  #Hyponatremia  Na+ 118 on presentation. Normal sodium in early November. Serum osmolality 260, urine osmolality 283, urine sodium 57. Hypovolemic on physical exam: dry MM and decreased skin turgor.  Etiology: Could be SIADH since Na improved from 118 to 121 w/ fluid restriction, Given hypovolemic physical exam, could be hypovolemic hyponatremia, CSW is a possibility given hypovolemic physical exam findings and p/w fall w/ headstrike but unlikely.    - Hypertonic saline w/ goal Na increase of 6-8meq over 24h  - continue to monitor w/ Q4H BMP and q12h urine studies  - fu nephrology recs  -TSH and AM cortisol WNL  - strict I and O    #Hypokalemia  #Hypomagnesemia  Likely in the setting of poor PO intake  Continue to monitor, replete as needed    #CKD   Patient with a hx of CKD, Hep C, Liver tx. Not currently getting dialysis. Baseline Cr 2.74 in early November.   UA- Protein>1000, RBC-26 represents a GN w/ nephritic range proteinuria    [facility] my chart accessed on daughters phone  - 11/21/2024 KFLC-272.2, LFLC- 28.4. K/L- 9.58, m-spike+, IgG-1089, iGa-211, iGm-72  - 11/21/2024  Uric acid-  6.7, LDH- 185  - 6/3/2024- Renal biopsy-                          - H&E- (1) segmental and global glomerulosclerosis, (2) diffuse global glomerulosclerosis (63%), w/ moderate to marked tubular atrophy & interstitial fibrosis w/ marked arterial and arteriolar sclerosis                         - LM - 16/24 glomeruli: globally sclerotic or show chronic hypoperfusion, 6/24: mild to moderate mesangial prominence, 2/24: segmental sclerosis, several bhavik show segmental "double contour" formation                         - IF - 4/6 globally sclerotic, Igm, Kappa, Lambda: trace granular mesagngial staining, IgG, IgA, C1q: no stain, 1+ C3- along tubular BM and in vessels, Casts- bitypic for both light chains. albumin neg, Fibrin neg                         - EM- 1/2 segmental sclerosis w/ subepithelial reparative changes as well as globular deposits consistent w/ hyaline w/ focal foot effacement.  GBM thickness mildly increaed, Subendothelial space:  no electron lucent expansion, No dense deposits. Mesangium: small electron dense deposits. Foci of mesangial interposition. No amyloid.   - 5/7/2024- iPTH- 112  - (3/2024) 24H Urine protein -1710, 75.2% paraprot excretion/24h  - 2/27/2024- 24H urine- 5-HIAA- 3.6 (WNL), Metanephrines- 35 (), normetanephrine- 122 (156-729), MPO neg, PR3 neg    - fu GN workup: Autoimmune (LAWANDA, ANCA, C3, C4, Cryoglobulin, anti-GBM ds, dsDNA), infection (HIV, Hep B, Hep C),   - continue to monitor  - avoid nephrotoxic agents       GI/:  #Liver transplant  S/p liver transplant in 3/2019 for cryptogenic cirrhosis. Home sirolimus 2mg QD.   - c/w sirolimus 2mg QD    #Unspecified Pancreatic Insufficiency   #Diarrhea  Elevated Alk Phos to 148 on admission. Likely elevated in the setting of pancreatic insufficiency.     [facility] my chart accessed on daughters phone  12/22/2023 MRI/MRCP abdomen w/ wo IV con: few pancreatic cystic lesions w/ indolent growth 1.6cm within pancreatic tail. no suspicious internal features, no main uct dilation. No solid mass  - C-Peptide- 10.4 (range 1.1-4.4)  - Fructosamine: 338 (0-285)  - low fecal elastatse 10/2023  - CMV+ 10/2023  - f/u GGT   - c/w home Creon   - c/w home loperamide     #NPO   Patient did not pass bedside dysphagia overnight 12/2-12/3. Patient NPO for now.   - Will reassess in AM    HEME:  #Chronic Anemia   Hemoglobin 11.8 on presentation. Could be 2/2 anemia of chronic disease or iron deficiency given CKD.   - continue to monitor    #MGUS  - elevated FLC w/ negative BM bx (7/2023)    ID:  #SIRS  #Leukocytosis  Met SIRS with tachypnea, tachycardia, and leukocytosis. Afebrile. Did have recent illness of URI. Low suspicion for bacterial infection at this time.   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 (Range, 2-96)                     PPX:  Fluids: fluid restriction of < 1L  Electrolytes: replete as needed  Nutrition: Diet, NPO:   Except Medications (12-03-24 @ 04:43) [Active]  PPI ppx: home famotidine 20mg BID  Dvt ppx: heparin 5000units subQ Q8H  Activity: fall precautions   Code Status: full code

## 2024-12-04 NOTE — SWALLOW FEES ASSESSMENT ADULT - PHARYNGEAL PHASE COMMENTS
Liquid boluses are seen to reach the pyriform sinuses and enter the endolarynx (penetration) before the swallow is triggered. White out period during the swallow is noted but hyolaryngeal complex and epiglottic inversion are suspected to be incomplete as there is evidence of penetration and aspiration during the swallow of liquids. Pt is sensate to aspiration and coughing is noted; however, this does not fully clear the airway of aspirant. Aspiration of liquids was eliminated with volume control as there was no aspiration seen for tsp (5ml) volumes of thin liquids. Base of tongue retraction and pharyngeal stripping wave are suspected to be reduced as there is both vallecula and pyriform residue to a mild-moderate degree. Residue clears with repeat swallow and or tsp thin liquid wash.

## 2024-12-04 NOTE — PROGRESS NOTE ADULT - ASSESSMENT
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     Broad differential at this point, can not r/o: RPGN, IRGN, GN(specially IgAN), AIN, Light chain cast Nephropathy(MM).   Pte also received IV contrast on 12/3, this will modify U. spec gravity/U. osmo/U. Na results and is also at higher risk for NORA.   Will also r/o secondary causes of HTN.  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   Severe Hypotonic hyponatremia with U. osmo 283 and U. Na 57 is suggestive of SAIDH.   Will treat as chronic hyponatremia, pte is a High risk for ODS. Correction rate of 6-8meq in 24h period, not higher than 8meq.   Na goal is 127-129meq on 12/5 in am labs, not higher than 129meq.     [facility] chart review:   11/21/2024 KFLC 272.2, LFLC 28.4. K/L 9.58, M spike+, IgG 1089, ObP444, IgM 72.   6/3/2024- Renal biopsy  -H&E- (1) segmental and global glomerulosclerosis, (2) diffuse global glomerulosclerosis (63%), w/ moderate to marked tubular atrophy & interstitial fibrosis w/ marked arterial and arteriolar sclerosis  - LM  16/24 glomeruli: globally sclerotic or show chronic hypoperfusion, 6/24: mild to moderate mesangial prominence, 2/24: segmental sclerosis, several bhavik show segmental "double contour" formation  - IF  4/6 globally sclerotic, Igm, Kappa, Lambda: trace granular mesagngial staining, IgG, IgA, C1q: no stain, 1+ C3- along tubular BM and in vessels, Casts- bitypic for both light chains. albumin neg, Fibrin neg  - EM 1/2 segmental sclerosis w/ subepithelial reparative changes as well as globular deposits consistent w/ hyaline w/ focal foot effacement.  GBM thickness mildly increaed, Subendothelial space:  no electron lucent expansion, No dense deposits. Mesangium: small electron dense deposits. Foci of mesangial interposition. No amyloid.     - (3/2024) 24H Urine protein -1710, 75.2% paraprot excretion/24h.   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   Pheochromocytoma was r/o.    F/u labs:   LDH wnl, Hapto 184, Hepatitis panel negative, Legionella neg, no schistocytes reported,   UPCR 7.1; ACR 4.7  FeK is elevated, ~18.1%, suggestive of renal losses, HCO3 is wnl(no RTA), these can also be explained for Light chain cast Nephropathy causing Fanconi syndrome.   TSH wnl  Cortisol levels elevated.   U. acid 5.2  Repeated UA with improvement of hematuria.     -Plan:  Continue 3% hypertonic saline @30ml/hr for 6h, recheck BMP and U. osmo @ noon.   BMP and U. osmo q6h during next 24h. Na goal is 127-129meq on 12/5 in am labs, not higher than 129meq.   F/u GN panel: C3/C4 levels, Cryoglobulins, dsDNA, MPO, PR3, ANCA panel, anti GBM, s. Immunoelectrophoresis(SPEP/FLC/..), [id number]. Also send PLA2R levels.  Obtain Aldosterone and Renin levels, also Renin plasma activity.   Replenish K and Mg aggressively. Aim for K>4.0 and Mg>2.0  Fluid restriction<1L daily for now.   Obtain Renal US w Doppler, will r/o SUKHJINDER and RVT(less likely).   Obtain Cystatin C.   Repeat UA with microscopy today.   Strict I&O. Dias placed after bladder scan with ~500ml of urine, uop ~ 900ml after Dias placed.   Avoid hypotension or sudden SBP drops. Aim for SBP~140, for now.   Pain/temperature management as per primary team, avoid nephrotoxic agents.   Renally dosed abx.   Not considering renal biopsy at this point. Will f/u lab results and modify treatment as need it once we establish the etiology of his renal impairment. Please obtain full K. biopsy report from [facility].   No indication for RRT at this point.                    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     Broad differential at this point, can not r/o: RPGN, IRGN, GN(specially IgAN), AIN, Light chain cast Nephropathy(MM/MGRS)  Pte also received IV contrast on 12/3, this will modify U. spec gravity/U. osmo/U. Na results and is also at higher risk for NORA.   Will also r/o secondary causes of HTN(Cushing/Hyperaldo/Pheo/Hyperthyroidism).   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   Severe Hypotonic hyponatremia with U. osmo 283 and U. Na 57 is suggestive of SAIDH.   Will treat as chronic hyponatremia, pte is a High risk for ODS. Correction rate of 6-8meq in 24h period, not higher than 8meq.   Na goal is 127-129meq on 12/5 in am labs, not higher than 129meq.     [facility] chart review:   11/21/2024 KFLC 272.2, LFLC 28.4. K/L 9.58, M spike+, IgG 1089, VkZ443, IgM 72.   6/3/2024- Renal biopsy  -H&E- (1) segmental and global glomerulosclerosis, (2) diffuse global glomerulosclerosis (63%), w/ moderate to marked tubular atrophy & interstitial fibrosis w/ marked arterial and arteriolar sclerosis  - LM  16/24 glomeruli: globally sclerotic or show chronic hypoperfusion, 6/24: mild to moderate mesangial prominence, 2/24: segmental sclerosis, several bhavik show segmental "double contour" formation  - IF  4/6 globally sclerotic, Igm, Kappa, Lambda: trace granular mesagngial staining, IgG, IgA, C1q: no stain, 1+ C3- along tubular BM and in vessels, Casts- bitypic for both light chains. albumin neg, Fibrin neg  - EM 1/2 segmental sclerosis w/ subepithelial reparative changes as well as globular deposits consistent w/ hyaline w/ focal foot effacement.  GBM thickness mildly increaed, Subendothelial space:  no electron lucent expansion, No dense deposits. Mesangium: small electron dense deposits. Foci of mesangial interposition. No amyloid.     - (3/2024) 24H Urine protein -1710, 75.2% paraprot excretion/24h.   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   Pheochromocytoma was r/o.    F/u labs:   LDH wnl, Hapto 184, Hepatitis panel negative, Legionella neg, no schistocytes reported,   UPCR 7.1; ACR 4.7  FeK is elevated, ~18.1%, suggestive of renal losses, HCO3 is wnl(no RTA), these can also be explained for Light chain cast Nephropathy causing Fanconi syndrome.   TSH wnl  Cortisol levels elevated.   U. acid 5.2  Repeated UA with improvement of hematuria.   S. osmo 260, calc s. osmo 254, no osmolal gap  cCa 10.0    -Plan:  Continue 3% hypertonic saline @30ml/hr for 6h, recheck BMP and U. osmo @ noon.   BMP and U. osmo q6h during next 24h. Na goal is 127-129meq on 12/5 in am labs, not higher than 129meq.   F/u GN panel: C3/C4 levels, Cryoglobulins, dsDNA, MPO, PR3, ANCA panel, anti GBM, s. Immunoelectrophoresis(SPEP/FLC/..), [id number]. Also send PLA2R levels.  Obtain Aldosterone and Renin levels, also Renin plasma activity.   Replenish K and Mg aggressively. Aim for K>4.0 and Mg>2.0  Fluid restriction<1L daily for now.   Obtain Renal US w Doppler, will r/o SUKHJINDER and RVT(less likely).   Obtain Cystatin C.   Repeat UA with microscopy today.   Strict I&O. Dias placed after bladder scan with ~500ml of urine, uop ~ 900ml after Dias placed.   Avoid hypotension or sudden SBP drops. Aim for SBP~140, for now.   Pain/temperature management as per primary team, avoid nephrotoxic agents.   Renally dosed abx.   Not considering renal biopsy at this point. Will f/u lab results and modify treatment as need it once we establish the etiology of his renal impairment. Please obtain full K. biopsy report from . [place].   No indication for RRT at this point.                    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     Broad differential at this point, can not r/o: RPGN, IRGN, GN(specially IgAN), AIN, Light chain cast Nephropathy(MM/MGRS)  Pte also received IV contrast on 12/3, this will modify U. spec gravity/U. osmo/U. Na results and is also at higher risk for NORA.   Will also r/o secondary causes of HTN(Cushing/Hyperaldo/Pheo/Hyperthyroidism).   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   Severe Hypotonic hyponatremia with U. osmo 283 and U. Na 57 is suggestive of SAIDH.   Will treat as chronic hyponatremia, pte is a High risk for ODS. Correction rate of 6-8meq in 24h period, not higher than 8meq.   Na goal is 127-129meq on 12/5 in am labs, not higher than 129meq.     [facility] chart review:   11/21/2024 KFLC 272.2, LFLC 28.4. K/L 9.58, M spike+, IgG 1089, TaI431, IgM 72.   6/3/2024- Renal biopsy  -H&E- (1) segmental and global glomerulosclerosis, (2) diffuse global glomerulosclerosis (63%), w/ moderate to marked tubular atrophy & interstitial fibrosis w/ marked arterial and arteriolar sclerosis  - LM  16/24 glomeruli: globally sclerotic or show chronic hypoperfusion, 6/24: mild to moderate mesangial prominence, 2/24: segmental sclerosis, several bhavik show segmental "double contour" formation  - IF  4/6 globally sclerotic, Igm, Kappa, Lambda: trace granular mesagngial staining, IgG, IgA, C1q: no stain, 1+ C3- along tubular BM and in vessels, Casts- bitypic for both light chains. albumin neg, Fibrin neg  - EM 1/2 segmental sclerosis w/ subepithelial reparative changes as well as globular deposits consistent w/ hyaline w/ focal foot effacement.  GBM thickness mildly increaed, Subendothelial space:  no electron lucent expansion, No dense deposits. Mesangium: small electron dense deposits. Foci of mesangial interposition. No amyloid.     - (3/2024) 24H Urine protein -1710, 75.2% paraprot excretion/24h.   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   Pheochromocytoma was r/o.    F/u labs:   LDH wnl, Hapto 184, Hepatitis panel negative, Legionella neg, no schistocytes reported,   UPCR 7.1; ACR 4.7  FeK is elevated, ~18.1%, suggestive of renal losses, HCO3 is wnl(no RTA), these can also be explained for Light chain cast Nephropathy causing Fanconi syndrome.   TSH wnl  Cortisol levels elevated.   U. acid 5.2  Repeated UA with improvement of hematuria.   S. osmo 260, calc s. osmo 254, no osmolal gap  cCa 10.0    -Plan:  Continue 3% hypertonic saline @30ml/hr for 6h, recheck BMP and U. osmo @ noon.   BMP and U. osmo q6h during next 24h. Na goal is 127-129meq on 12/5 in am labs, not higher than 129meq.   F/u GN panel: C3/C4 levels, Cryoglobulins, dsDNA, MPO, PR3, ANCA panel, anti GBM, s. Immunoelectrophoresis(SPEP/FLC/..), [id number]. Also send PLA2R levels.  Obtain Aldosterone and Renin levels, also Renin plasma activity.   Replenish K and Mg aggressively. Aim for K>4.0 and Mg>2.0  Fluid restriction<1L daily for now.   Obtain Renal US w Doppler, will r/o SUKHJINDER and RVT(less likely).   Obtain Cystatin C.   Repeat UA with microscopy today.   Strict I&O. Dias placed after bladder scan with ~500ml of urine, uop ~ 900ml after Dias placed.   Avoid hypotension or sudden SBP drops. Aim for SBP~140, for now.   Pain/temperature management as per primary team, avoid nephrotoxic agents.   Renally dosed abx.   Not considering renal biopsy at this point. Will f/u lab results and modify treatment as need it once we establish the etiology of his renal impairment. Please obtain full K. biopsy report from . [place].   No indication for RRT at this point.                    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     Broad differential at this point, can not r/o: RPGN, IRGN, GN(specially IgAN), AIN, Light chain cast Nephropathy(MM/MGRS)  Pte also received IV contrast on 12/3, this will modify U. spec gravity/U. osmo/U. Na results and is also at higher risk for NORA.   Will also r/o secondary causes of HTN(Cushing/Hyperaldo/Pheo/Hyperthyroidism).   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   Severe Hypotonic hyponatremia with U. osmo 283 and U. Na 57 is suggestive of SAIDH.   Will treat as chronic hyponatremia, pte is a High risk for ODS. Correction rate of 6-8meq in 24h period, not higher than 8meq.   Na goal is 127-129meq on 12/5 in am labs, not higher than 129meq.     [facility] chart review:   11/21/2024 KFLC 272.2, LFLC 28.4. K/L 9.58, M spike+, IgG 1089, JeT790, IgM 72.   6/3/2024- Renal biopsy  -H&E- (1) segmental and global glomerulosclerosis, (2) diffuse global glomerulosclerosis (63%), w/ moderate to marked tubular atrophy & interstitial fibrosis w/ marked arterial and arteriolar sclerosis  - LM  16/24 glomeruli: globally sclerotic or show chronic hypoperfusion, 6/24: mild to moderate mesangial prominence, 2/24: segmental sclerosis, several bhavik show segmental "double contour" formation  - IF  4/6 globally sclerotic, Igm, Kappa, Lambda: trace granular mesagngial staining, IgG, IgA, C1q: no stain, 1+ C3- along tubular BM and in vessels, Casts- bitypic for both light chains. albumin neg, Fibrin neg  - EM 1/2 segmental sclerosis w/ subepithelial reparative changes as well as globular deposits consistent w/ hyaline w/ focal foot effacement.  GBM thickness mildly increaed, Subendothelial space:  no electron lucent expansion, No dense deposits. Mesangium: small electron dense deposits. Foci of mesangial interposition. No amyloid.     - (3/2024) 24H Urine protein -1710, 75.2% paraprot excretion/24h.   - 2/27/2024- 24H urine- 5-HIAA- 3.6 (WNL), Metanephrines- 35 (), normetanephrine- 122 (156-729), MPO neg, PR3 neg    F/u labs:   LDH wnl, Hapto 184, Hepatitis panel negative, Legionella neg, no schistocytes reported,   UPCR 7.1; ACR 4.7  FeK is elevated, ~18.1%, suggestive of renal losses, HCO3 is wnl(no RTA), these can also be explained for Light chain cast Nephropathy causing Fanconi syndrome.   TSH wnl  Cortisol levels elevated.   U. acid 5.2  Repeated UA with improvement of hematuria.   S. osmo 260, calc s. osmo 254, no osmolal gap  cCa 10.0    -Plan:  Continue 3% hypertonic saline @30ml/hr for 6h, recheck BMP and U. osmo @ noon.   BMP and U. osmo q6h during next 24h. Na goal is 127-129meq on 12/5 in am labs, not higher than 129meq.   F/u GN panel: C3/C4 levels, Cryoglobulins, dsDNA, MPO, PR3, ANCA panel, anti GBM, s. Immunoelectrophoresis(SPEP/FLC/..), [id number]. Also send PLA2R levels.  Obtain Aldosterone and Renin levels, also Renin plasma activity.   Replenish K and Mg aggressively. Aim for K>4.0 and Mg>2.0  Fluid restriction<1L daily for now.   Obtain Renal US w Doppler, will r/o SUKHJINDER and RVT(less likely).   Obtain Cystatin C.   Repeat UA with microscopy today.   Strict I&O. Dias placed after bladder scan with ~500ml of urine, uop ~ 900ml after Dias placed.   Avoid hypotension or sudden SBP drops. Aim for SBP~140, for now.   Pain/temperature management as per primary team, avoid nephrotoxic agents.   Renally dosed abx.   Not considering renal biopsy at this point. Will f/u lab results and modify treatment as need it once we establish the etiology of his renal impairment. Please obtain full K. biopsy report from [facility].   No indication for RRT at this point.

## 2024-12-04 NOTE — SWALLOW FEES ASSESSMENT ADULT - RECOMMENDED CONSISTENCY
Soft and bite sized solids and TSP amounts of thin liquids. NO CUP SIPS, NO STRAWS, NO THICKENED LIQUIDS. Would continue NGT for hydration and meds and to ensure proper nutrition/hydration via oral diet. SLP team to follow. Pt is expected to improve as medical issues improve.

## 2024-12-04 NOTE — PROGRESS NOTE ADULT - ASSESSMENT
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 Admitted for HTN emerg following fall and head trauma. Pulmonary consulted for abnormal CT.    Data Review:  - CT chest with bilateral UL cavities, centrilobular and para-septal emphysema, interlobular septal thickening, bronchial wall thickening; reviewed external CT from September 2024; grossly unchanged   - leukocytosis improved  - Eos 0.03 on admission  - afebrile with stable SpO2 on 2LNC  - VBG with pH 7.44 and PCO2 37  - elevated troponin and BNP    #M. abscessus cavitary lesions  #Severe centrilobular emphysema  #COPD  #Cryptogenic cirrhosis s/p transplant    Recommendations   - reviewed external records and imaging; at this time CT is unchanged from earlier this year at 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 Admitted for HTN emerg following fall and head trauma. Pulmonary consulted for abnormal CT.    Data Review:  - CT chest with bilateral UL cavities, centrilobular and para-septal emphysema, interlobular septal thickening, bronchial wall thickening; reviewed external CT from September 2024; grossly unchanged   - leukocytosis improved  - Eos 0.03 on admission  - afebrile with stable SpO2 on 2LNC  - VBG with pH 7.44 and PCO2 37  - elevated troponin and BNP    #M. 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

## 2024-12-04 NOTE — PROGRESS NOTE ADULT - SUBJECTIVE AND OBJECTIVE BOX
Evaluated at bedside, ?sCr plateauing ~ 2.3, records obtained from [facility] hospitalization @ [facility] where a K biopsy was performed and Pheochromocytoma was also r/o.     PHYSICAL EXAM:  General:NAD.   HEENT: NC/AT; PERRL, anicteric sclera; MMM  Neck: supple  Cardiovascular: +S1/S2, RRR  Respiratory: CTA B/L; no W/R/R  Gastrointestinal: soft, NT/ND; +BSx4  Extremities: WWP; no edema, clubbing or cyanosis  Vascular: 2+ radial, DP/PT pulses B/L  Neurological: AAOx3; no focal deficits  Psychiatric: pleasant mood and affect  Dermatologic: no appreciable wounds or damage to the skin    VITALS:  T(F): 98.1 (12-04-24 @ 05:58), Max: 98.1 (12-04-24 @ 05:58)  HR: 80 (12-04-24 @ 08:25)  BP: 161/107 (12-04-24 @ 08:25)  RR: 16 (12-04-24 @ 08:25)  SpO2: 95% (12-04-24 @ 08:25)  Wt(kg): --    12-02 @ 07:01  -  12-03 @ 07:00  --------------------------------------------------------  IN: 0 mL / OUT: 275 mL / NET: -275 mL    12-03 @ 07:01  -  12-04 @ 07:00  --------------------------------------------------------  IN: 0 mL / OUT: 2550 mL / NET: -2550 mL    12-04 @ 07:01  -  12-04 @ 09:54  --------------------------------------------------------  IN: 0 mL / OUT: 200 mL / NET: -200 mL          LABS:  12-04    121[L]  |  87[L]  |  23  ----------------------------<  112[H]  3.3[L]   |  23  |  2.34[H]    Ca    9.0      04 Dec 2024 05:30  Phos  3.1     12-04  Mg     2.4     12-04    TPro  5.5[L]  /  Alb  2.7[L]  /  TBili  0.3  /  DBili      /  AST  21  /  ALT  11  /  AlkPhos  109  12-04                          10.0   7.96  )-----------( 154      ( 04 Dec 2024 05:30 )             30.8         albuterol/ipratropium for Nebulization 3 milliLiter(s) Nebulizer every 6 hours  aspirin  chewable 81 milliGRAM(s) Oral daily  atorvastatin 80 milliGRAM(s) Oral at bedtime  famotidine    Tablet 20 milliGRAM(s) Oral every 48 hours  finasteride 5 milliGRAM(s) Oral daily  fluticasone propionate/ salmeterol 100-50 MICROgram(s) Diskus 1 Dose(s) Inhalation two times a day  heparin   Injectable 5000 Unit(s) SubCutaneous every 8 hours  loperamide Liquid 2 milliGRAM(s) Oral every 8 hours  naphazoline/pheniramine Solution 2 Drop(s) Both EYES two times a day  ondansetron    Tablet 4 milliGRAM(s) Oral every 8 hours PRN  pancrelipase  (CREON 36,000 Lipase Units) 1 Capsule(s) Oral three times a day with meals  piperacillin/tazobactam IVPB.. 4.5 Gram(s) IV Intermittent every 8 hours  potassium chloride  10 mEq/100 mL IVPB 10 milliEquivalent(s) IV Intermittent every 1 hour  sirolimus 2 milliGRAM(s) Oral every 24 hours  sodium chloride 3%  Inhalation 4 milliLiter(s) Inhalation every 12 hours  sodium chloride 3%. 500 milliLiter(s) IV Continuous <Continuous>  tamsulosin 0.4 milliGRAM(s) Oral every 24 hours  tiotropium 2.5 MICROgram(s) Inhaler 2 Puff(s) Inhalation daily

## 2024-12-05 LAB
ACE SERPL-CCNC: 47 U/L — SIGNIFICANT CHANGE UP (ref 14–82)
ADAMTS13 ACTIVITY: 113 % — SIGNIFICANT CHANGE UP (ref 40–130)
ALBUMIN SERPL ELPH-MCNC: 2.8 G/DL — LOW (ref 3.3–5)
ALDOST SERPL-MCNC: <3 NG/DL — SIGNIFICANT CHANGE UP
ALP SERPL-CCNC: 105 U/L — SIGNIFICANT CHANGE UP (ref 40–120)
ALT FLD-CCNC: 12 U/L — SIGNIFICANT CHANGE UP (ref 10–45)
ANION GAP SERPL CALC-SCNC: 11 MMOL/L — SIGNIFICANT CHANGE UP (ref 5–17)
AST SERPL-CCNC: 23 U/L — SIGNIFICANT CHANGE UP (ref 10–40)
BASOPHILS # BLD AUTO: 0.01 K/UL — SIGNIFICANT CHANGE UP (ref 0–0.2)
BASOPHILS NFR BLD AUTO: 0.1 % — SIGNIFICANT CHANGE UP (ref 0–2)
BILIRUB SERPL-MCNC: 0.4 MG/DL — SIGNIFICANT CHANGE UP (ref 0.2–1.2)
BUN SERPL-MCNC: 22 MG/DL — SIGNIFICANT CHANGE UP (ref 7–23)
CALCIUM SERPL-MCNC: 9.1 MG/DL — SIGNIFICANT CHANGE UP (ref 8.4–10.5)
CHLORIDE SERPL-SCNC: 99 MMOL/L — SIGNIFICANT CHANGE UP (ref 96–108)
CO2 SERPL-SCNC: 19 MMOL/L — LOW (ref 22–31)
CREAT SERPL-MCNC: 2.22 MG/DL — HIGH (ref 0.5–1.3)
EGFR: 30 ML/MIN/1.73M2 — LOW
EOSINOPHIL # BLD AUTO: 0.09 K/UL — SIGNIFICANT CHANGE UP (ref 0–0.5)
EOSINOPHIL NFR BLD AUTO: 1.3 % — SIGNIFICANT CHANGE UP (ref 0–6)
GBM IGG SER-ACNC: <0.2 — SIGNIFICANT CHANGE UP (ref 0–0.9)
GLUCOSE SERPL-MCNC: 108 MG/DL — HIGH (ref 70–99)
HCT VFR BLD CALC: 34.8 % — LOW (ref 39–50)
HGB BLD-MCNC: 11.2 G/DL — LOW (ref 13–17)
IMM GRANULOCYTES NFR BLD AUTO: 0.3 % — SIGNIFICANT CHANGE UP (ref 0–0.9)
LYMPHOCYTES # BLD AUTO: 1.13 K/UL — SIGNIFICANT CHANGE UP (ref 1–3.3)
LYMPHOCYTES # BLD AUTO: 15.7 % — SIGNIFICANT CHANGE UP (ref 13–44)
MAGNESIUM SERPL-MCNC: 2.2 MG/DL — SIGNIFICANT CHANGE UP (ref 1.6–2.6)
MCHC RBC-ENTMCNC: 30.6 PG — SIGNIFICANT CHANGE UP (ref 27–34)
MCHC RBC-ENTMCNC: 32.2 G/DL — SIGNIFICANT CHANGE UP (ref 32–36)
MCV RBC AUTO: 95.1 FL — SIGNIFICANT CHANGE UP (ref 80–100)
MONOCYTES # BLD AUTO: 0.59 K/UL — SIGNIFICANT CHANGE UP (ref 0–0.9)
MONOCYTES NFR BLD AUTO: 8.2 % — SIGNIFICANT CHANGE UP (ref 2–14)
NEUTROPHILS # BLD AUTO: 5.34 K/UL — SIGNIFICANT CHANGE UP (ref 1.8–7.4)
NEUTROPHILS NFR BLD AUTO: 74.4 % — SIGNIFICANT CHANGE UP (ref 43–77)
NRBC # BLD: 0 /100 WBCS — SIGNIFICANT CHANGE UP (ref 0–0)
PHOSPHATE SERPL-MCNC: 2.5 MG/DL — SIGNIFICANT CHANGE UP (ref 2.5–4.5)
PLATELET # BLD AUTO: 146 K/UL — LOW (ref 150–400)
POTASSIUM SERPL-MCNC: 4.4 MMOL/L — SIGNIFICANT CHANGE UP (ref 3.5–5.3)
POTASSIUM SERPL-SCNC: 4.4 MMOL/L — SIGNIFICANT CHANGE UP (ref 3.5–5.3)
PROT SERPL-MCNC: 5.7 G/DL — LOW (ref 6–8.3)
RBC # BLD: 3.66 M/UL — LOW (ref 4.2–5.8)
RBC # FLD: 15 % — HIGH (ref 10.3–14.5)
SODIUM SERPL-SCNC: 129 MMOL/L — LOW (ref 135–145)
WBC # BLD: 7.18 K/UL — SIGNIFICANT CHANGE UP (ref 3.8–10.5)
WBC # FLD AUTO: 7.18 K/UL — SIGNIFICANT CHANGE UP (ref 3.8–10.5)

## 2024-12-05 PROCEDURE — 99233 SBSQ HOSP IP/OBS HIGH 50: CPT | Mod: GC

## 2024-12-05 PROCEDURE — 99233 SBSQ HOSP IP/OBS HIGH 50: CPT

## 2024-12-05 RX ORDER — SODIUM CHLORIDE 0.65 %
1 AEROSOL, SPRAY (ML) NASAL
Refills: 0 | Status: DISCONTINUED | OUTPATIENT
Start: 2024-12-05 | End: 2024-12-13

## 2024-12-05 RX ORDER — SODIUM,POTASSIUM PHOSPHATES 278-250MG
1 POWDER IN PACKET (EA) ORAL ONCE
Refills: 0 | Status: COMPLETED | OUTPATIENT
Start: 2024-12-05 | End: 2024-12-05

## 2024-12-05 RX ORDER — SODIUM CHLORIDE 0.65 %
1 AEROSOL, SPRAY (ML) NASAL ONCE
Refills: 0 | Status: COMPLETED | OUTPATIENT
Start: 2024-12-05 | End: 2024-12-05

## 2024-12-05 RX ORDER — FAMOTIDINE 20 MG/1
20 TABLET, FILM COATED ORAL
Refills: 0 | Status: DISCONTINUED | OUTPATIENT
Start: 2024-12-05 | End: 2024-12-13

## 2024-12-05 RX ORDER — BUPROPION HCL 100 MG
150 TABLET ORAL DAILY
Refills: 0 | Status: DISCONTINUED | OUTPATIENT
Start: 2024-12-06 | End: 2024-12-13

## 2024-12-05 RX ORDER — ONDANSETRON HYDROCHLORIDE 4 MG/1
4 TABLET, FILM COATED ORAL EVERY 8 HOURS
Refills: 0 | Status: DISCONTINUED | OUTPATIENT
Start: 2024-12-05 | End: 2024-12-13

## 2024-12-05 RX ORDER — 0.9 % SODIUM CHLORIDE 0.9 %
250 INTRAVENOUS SOLUTION INTRAVENOUS
Refills: 0 | Status: COMPLETED | OUTPATIENT
Start: 2024-12-05 | End: 2024-12-05

## 2024-12-05 RX ORDER — FLUTICASONE PROPIONATE 50 MCG
1 SPRAY, SUSPENSION NASAL
Refills: 0 | Status: DISCONTINUED | OUTPATIENT
Start: 2024-12-05 | End: 2024-12-05

## 2024-12-05 RX ADMIN — Medication 250 MILLILITER(S): at 00:42

## 2024-12-05 RX ADMIN — SODIUM CHLORIDE 4 MILLILITER(S): 9 INJECTION, SOLUTION INTRAMUSCULAR; INTRAVENOUS; SUBCUTANEOUS at 10:04

## 2024-12-05 RX ADMIN — TAMSULOSIN HYDROCHLORIDE 0.4 MILLIGRAM(S): 0.4 CAPSULE ORAL at 17:33

## 2024-12-05 RX ADMIN — Medication 5000 UNIT(S): at 21:29

## 2024-12-05 RX ADMIN — ACETAMINOPHEN 500MG 650 MILLIGRAM(S): 500 TABLET, COATED ORAL at 09:45

## 2024-12-05 RX ADMIN — IPRATROPIUM BROMIDE AND ALBUTEROL SULFATE 3 MILLILITER(S): 2.5; .5 SOLUTION RESPIRATORY (INHALATION) at 16:27

## 2024-12-05 RX ADMIN — Medication 2 DROP(S): at 06:07

## 2024-12-05 RX ADMIN — PIPERACILLIN SODIUM AND TAZOBACTAM SODIUM 25 GRAM(S): 4; .5 INJECTION, POWDER, LYOPHILIZED, FOR SOLUTION INTRAVENOUS at 06:06

## 2024-12-05 RX ADMIN — SODIUM CHLORIDE 4 MILLILITER(S): 9 INJECTION, SOLUTION INTRAMUSCULAR; INTRAVENOUS; SUBCUTANEOUS at 21:30

## 2024-12-05 RX ADMIN — Medication 5000 UNIT(S): at 14:02

## 2024-12-05 RX ADMIN — FLUTICASONE PROPIONATE AND SALMETEROL XINAFOATE 1 DOSE(S): 45; 21 AEROSOL, METERED RESPIRATORY (INHALATION) at 17:26

## 2024-12-05 RX ADMIN — ACETAMINOPHEN 500MG 650 MILLIGRAM(S): 500 TABLET, COATED ORAL at 21:38

## 2024-12-05 RX ADMIN — Medication 1 SPRAY(S): at 11:33

## 2024-12-05 RX ADMIN — PIPERACILLIN SODIUM AND TAZOBACTAM SODIUM 25 GRAM(S): 4; .5 INJECTION, POWDER, LYOPHILIZED, FOR SOLUTION INTRAVENOUS at 21:30

## 2024-12-05 RX ADMIN — Medication 1 SPRAY(S): at 06:15

## 2024-12-05 RX ADMIN — IPRATROPIUM BROMIDE AND ALBUTEROL SULFATE 3 MILLILITER(S): 2.5; .5 SOLUTION RESPIRATORY (INHALATION) at 10:03

## 2024-12-05 RX ADMIN — PIPERACILLIN SODIUM AND TAZOBACTAM SODIUM 25 GRAM(S): 4; .5 INJECTION, POWDER, LYOPHILIZED, FOR SOLUTION INTRAVENOUS at 14:02

## 2024-12-05 RX ADMIN — Medication 1 PACKET(S): at 11:34

## 2024-12-05 RX ADMIN — IPRATROPIUM BROMIDE AND ALBUTEROL SULFATE 3 MILLILITER(S): 2.5; .5 SOLUTION RESPIRATORY (INHALATION) at 21:30

## 2024-12-05 RX ADMIN — Medication 3 CAPSULE(S): at 08:15

## 2024-12-05 RX ADMIN — Medication 1 SPRAY(S): at 18:20

## 2024-12-05 RX ADMIN — Medication 1 SPRAY(S): at 21:31

## 2024-12-05 RX ADMIN — FAMOTIDINE 20 MILLIGRAM(S): 20 TABLET, FILM COATED ORAL at 18:20

## 2024-12-05 RX ADMIN — Medication 3 CAPSULE(S): at 17:31

## 2024-12-05 RX ADMIN — Medication 2 MILLIGRAM(S): at 04:15

## 2024-12-05 RX ADMIN — SIROLIMUS 2 MILLIGRAM(S): 1 TABLET ORAL at 06:44

## 2024-12-05 RX ADMIN — Medication 2 MILLIGRAM(S): at 14:02

## 2024-12-05 RX ADMIN — IPRATROPIUM BROMIDE AND ALBUTEROL SULFATE 3 MILLILITER(S): 2.5; .5 SOLUTION RESPIRATORY (INHALATION) at 04:15

## 2024-12-05 RX ADMIN — Medication 81 MILLIGRAM(S): at 11:32

## 2024-12-05 RX ADMIN — FLUTICASONE PROPIONATE AND SALMETEROL XINAFOATE 1 DOSE(S): 45; 21 AEROSOL, METERED RESPIRATORY (INHALATION) at 06:07

## 2024-12-05 RX ADMIN — Medication 5000 UNIT(S): at 06:24

## 2024-12-05 RX ADMIN — Medication 2 PUFF(S): at 11:33

## 2024-12-05 RX ADMIN — Medication 2 MILLIGRAM(S): at 17:33

## 2024-12-05 RX ADMIN — FAMOTIDINE 20 MILLIGRAM(S): 20 TABLET, FILM COATED ORAL at 06:08

## 2024-12-05 RX ADMIN — ACETAMINOPHEN 500MG 650 MILLIGRAM(S): 500 TABLET, COATED ORAL at 22:10

## 2024-12-05 RX ADMIN — ACETAMINOPHEN 500MG 650 MILLIGRAM(S): 500 TABLET, COATED ORAL at 08:48

## 2024-12-05 RX ADMIN — Medication 3 CAPSULE(S): at 11:48

## 2024-12-05 RX ADMIN — Medication 2 DROP(S): at 17:25

## 2024-12-05 NOTE — PROGRESS NOTE ADULT - SUBJECTIVE AND OBJECTIVE BOX
*****INCOMPLETE NOTE*****    INTERVAL HPI/OVERNIGHT EVENTS:    SUBJECTIVE: Patient seen and examined at bedside, resting comfortably in bed, and does not appear to be in any acute distress. Patient reports    Vital Signs Last 24 Hrs  T(C): 36.5 (05 Dec 2024 05:46), Max: 36.9 (05 Dec 2024 01:00)  T(F): 97.7 (05 Dec 2024 05:46), Max: 98.5 (05 Dec 2024 01:00)  HR: 90 (05 Dec 2024 04:05) (80 - 102)  BP: 154/101 (05 Dec 2024 04:05) (137/91 - 161/107)  BP(mean): 123 (05 Dec 2024 04:05) (109 - 130)  RR: 17 (05 Dec 2024 04:05) (16 - 18)  SpO2: 99% (05 Dec 2024 04:05) (95% - 99%)    Parameters below as of 05 Dec 2024 04:05  Patient On (Oxygen Delivery Method): nasal cannula w/ humidification  O2 Flow (L/min): 2      PHYSICAL EXAM:  General: in no acute distress  Eyes: EOMI intact bilaterally. Anicteric sclerae, moist conjunctivae  HENT: Moist mucous membranes  Neck: Trachea midline, supple  Lungs: CTA B/L. No wheezes, rales, or rhonchi  Cardiovascular: RRR. No murmurs, rubs, or gallops  Abdomen: Soft, non-tender non-distended; No rebound or guarding  Extremities: WWP, No clubbing, cyanosis or edema  Neurological: Alert and oriented  Skin: Warm and dry. No obvious rash     LABS:                        10.0   7.96  )-----------( 154      ( 04 Dec 2024 05:30 )             30.8     12-04    130[L]  |  96  |  22  ----------------------------<  140[H]  4.6   |  22  |  2.29[H]    Ca    9.0      04 Dec 2024 22:00  Phos  3.1     12-04  Mg     2.4     12-04    TPro  5.5[L]  /  Alb  2.7[L]  /  TBili  0.3  /  DBili  x   /  AST  21  /  ALT  11  /  AlkPhos  109  12-04   INTERVAL HPI/OVERNIGHT EVENTS: Na at goal w/ hypertonic saline, recent CT imaging compared to prior record, no interval change, fu outpt for chr NADIR.    SUBJECTIVE: Patient seen and examined at bedside, resting comfortably in bed, and does not appear to be in any acute distress. Patient reports feeling better.    Vital Signs Last 24 Hrs  T(C): 36.5 (05 Dec 2024 05:46), Max: 36.9 (05 Dec 2024 01:00)  T(F): 97.7 (05 Dec 2024 05:46), Max: 98.5 (05 Dec 2024 01:00)  HR: 90 (05 Dec 2024 04:05) (80 - 102)  BP: 154/101 (05 Dec 2024 04:05) (137/91 - 161/107)  BP(mean): 123 (05 Dec 2024 04:05) (109 - 130)  RR: 17 (05 Dec 2024 04:05) (16 - 18)  SpO2: 99% (05 Dec 2024 04:05) (95% - 99%)    Parameters below as of 05 Dec 2024 04:05  Patient On (Oxygen Delivery Method): nasal cannula w/ humidification  O2 Flow (L/min): 2      PHYSICAL EXAM:  General: in no acute distress  Eyes: EOMI intact bilaterally. Anicteric sclerae, moist conjunctivae  HENT: Moist mucous membranes  Neck: Trachea midline, supple  Lungs: CTA B/L. No wheezes, rales, or rhonchi  Cardiovascular: RRR. No murmurs, rubs, or gallops  Abdomen: Soft, non-tender non-distended; No rebound or guarding  Extremities: WWP, No clubbing, cyanosis or edema  Neurological: Alert and oriented  Skin: Warm and dry. No obvious rash     LABS:                        10.0   7.96  )-----------( 154      ( 04 Dec 2024 05:30 )             30.8     12-04    130[L]  |  96  |  22  ----------------------------<  140[H]  4.6   |  22  |  2.29[H]    Ca    9.0      04 Dec 2024 22:00  Phos  3.1     12-04  Mg     2.4     12-04    TPro  5.5[L]  /  Alb  2.7[L]  /  TBili  0.3  /  DBili  x   /  AST  21  /  ALT  11  /  AlkPhos  109  12-04   HOSPITAL COURSE: WANDER VAZQUEZ is a 79y year old Male with a PMHx significant for hepatitis C s/p liver transplant in 3/2019 for cryptogenic cirrhosis (on sirolimus for 5 years), MGUS, unspecified autonomic nervous system disorder w/ hx of orthostasis, COPD, Mycobacterium avium complex lung disease, S/P 8 weeks on rx in 2023, D/C'd d/t AEs, chronic anemia, amaurosis fugax, Crohn's disease, BPH, and unspecified pancreatic insufficiency p/ED on 12/2 w/fall w/head-strike, wo LOC W/  in the field. In ED found to have hypertensive emergency w/ /127 w/ encephalopathy, improved to baseline now, and S.Na-118, hypotonic hypovolemic hyponatremia, treated w/ hypertonic saline. Sirolimus level 2.7 and hepatology consulted for sirolimus dosing. Failed bedside dysphagia, NG Tube 12/4-12/5, FEES done, SLP recommended soft and bite sized diet, pt able to tolerate PO. Pulm consulted for Chronic NADIR, advised to fu outpt pulm. Pt currently HDS, being stepped down to [facility], Pending MRI brain.      INTERVAL HPI/OVERNIGHT EVENTS: Na at goal w/ hypertonic saline, recent CT imaging compared to prior record, no interval change, fu outpt for chr NADIR.    SUBJECTIVE: Patient seen and examined at bedside, resting comfortably in bed, and does not appear to be in any acute distress. Patient reports feeling better.    Vital Signs Last 24 Hrs  T(C): 36.5 (05 Dec 2024 05:46), Max: 36.9 (05 Dec 2024 01:00)  T(F): 97.7 (05 Dec 2024 05:46), Max: 98.5 (05 Dec 2024 01:00)  HR: 90 (05 Dec 2024 04:05) (80 - 102)  BP: 154/101 (05 Dec 2024 04:05) (137/91 - 161/107)  BP(mean): 123 (05 Dec 2024 04:05) (109 - 130)  RR: 17 (05 Dec 2024 04:05) (16 - 18)  SpO2: 99% (05 Dec 2024 04:05) (95% - 99%)    Parameters below as of 05 Dec 2024 04:05  Patient On (Oxygen Delivery Method): nasal cannula w/ humidification  O2 Flow (L/min): 2      PHYSICAL EXAM:  General: in no acute distress  Eyes: EOMI intact bilaterally. Anicteric sclerae, moist conjunctivae  HENT: Moist mucous membranes  Neck: Trachea midline, supple  Lungs: CTA B/L. No wheezes, rales, or rhonchi  Cardiovascular: RRR. No murmurs, rubs, or gallops  Abdomen: Soft, non-tender non-distended; No rebound or guarding  Extremities: WWP, No clubbing, cyanosis or edema  Neurological: Alert and oriented  Skin: Warm and dry. No obvious rash     LABS:                        10.0   7.96  )-----------( 154      ( 04 Dec 2024 05:30 )             30.8     12-04    130[L]  |  96  |  22  ----------------------------<  140[H]  4.6   |  22  |  2.29[H]    Ca    9.0      04 Dec 2024 22:00  Phos  3.1     12-04  Mg     2.4     12-04    TPro  5.5[L]  /  Alb  2.7[L]  /  TBili  0.3  /  DBili  x   /  AST  21  /  ALT  11  /  AlkPhos  109  12-04   HOSPITAL COURSE: WANDER VAZQUEZ is a 79y year old Male with a PMHx significant for hepatitis C s/p liver transplant in 3/2019 for cryptogenic cirrhosis (on sirolimus for 5 years), MGUS, unspecified autonomic nervous system disorder w/ hx of orthostasis, COPD, Mycobacterium avium complex lung disease, S/P 8 weeks on rx in 2023, D/C'd d/t AEs, chronic anemia, amaurosis fugax, Crohn's disease, BPH, and unspecified pancreatic insufficiency p/ED on 12/2 w/fall w/head-strike, wo LOC W/  in the field. In ED found to have hypertensive emergency w/ /127 w/ encephalopathy, improved to baseline now, and S.Na-118, hypotonic hypovolemic hyponatremia, treated w/ hypertonic saline. Sirolimus level 2.7 and hepatology consulted for sirolimus dosing. Failed bedside dysphagia, NG Tube 12/4-12/5, FEES done, SLP recommended soft and bite sized diet, pt able to tolerate PO. Pt tested Coronavirus OC+, on aerobika bid, Pulm consulted for Chronic NADIR, initially statrted on vancomycin+azithromycin+zosyn, de-escalated to zosyn after reviewing [facility] records, advised to fu outpt pulm. Pt currently HDS, being stepped down to RMF, Pending MRI brain.      INTERVAL HPI/OVERNIGHT EVENTS: Na at goal w/ hypertonic saline, recent CT imaging compared to prior record, no interval change, fu outpt for chr NADIR.    SUBJECTIVE: Patient seen and examined at bedside, resting comfortably in bed, and does not appear to be in any acute distress. Patient reports feeling better.    Vital Signs Last 24 Hrs  T(C): 36.5 (05 Dec 2024 05:46), Max: 36.9 (05 Dec 2024 01:00)  T(F): 97.7 (05 Dec 2024 05:46), Max: 98.5 (05 Dec 2024 01:00)  HR: 90 (05 Dec 2024 04:05) (80 - 102)  BP: 154/101 (05 Dec 2024 04:05) (137/91 - 161/107)  BP(mean): 123 (05 Dec 2024 04:05) (109 - 130)  RR: 17 (05 Dec 2024 04:05) (16 - 18)  SpO2: 99% (05 Dec 2024 04:05) (95% - 99%)    Parameters below as of 05 Dec 2024 04:05  Patient On (Oxygen Delivery Method): nasal cannula w/ humidification  O2 Flow (L/min): 2      PHYSICAL EXAM:  General: in no acute distress  Eyes: EOMI intact bilaterally. Anicteric sclerae, moist conjunctivae  HENT: Moist mucous membranes  Neck: Trachea midline, supple  Lungs: CTA B/L. No wheezes, rales, or rhonchi  Cardiovascular: RRR. No murmurs, rubs, or gallops  Abdomen: Soft, non-tender non-distended; No rebound or guarding  Extremities: WWP, No clubbing, cyanosis or edema  Neurological: Alert and oriented  Skin: Warm and dry. No obvious rash     LABS:                        10.0   7.96  )-----------( 154      ( 04 Dec 2024 05:30 )             30.8     12-04    130[L]  |  96  |  22  ----------------------------<  140[H]  4.6   |  22  |  2.29[H]    Ca    9.0      04 Dec 2024 22:00  Phos  3.1     12-04  Mg     2.4     12-04    TPro  5.5[L]  /  Alb  2.7[L]  /  TBili  0.3  /  DBili  x   /  AST  21  /  ALT  11  /  AlkPhos  109  12-04

## 2024-12-05 NOTE — ADVANCED PRACTICE NURSE CONSULT - REASON FOR CONSULT
possible sacral wound    Per chart review, WANDER VAZQUEZ is a 79y year old Male with a PMHx significant for hepatitis C s/p liver transplant in 3/2019 for cryptogenic cirrhosis (on sirolimus for 5 years), unspecified autonomic nervous system disorder w/ hx of orthostasis, COPD, Mycobacterium avium complex lung disease, chronic anemia, amaurosis fugax, Crohn's disease, BPH, and unspecified pancreatic insufficiency. Patient presented to the ED on 12/2 after having fallen at home with a head-strike, without loss of consciousness. Found to be hyponatremic to 118 and found to have hypertensive urgency on presentation. Admitted for further medical management of blood pressure and hyponatremia.

## 2024-12-05 NOTE — DIETITIAN NUTRITION RISK NOTIFICATION - TREATMENT: THE FOLLOWING DIET HAS BEEN RECOMMENDED
-Continue current diet with appropriate textures/consistencies per SLP   -Add Ensure Plus High Protein (350 calories and 20 gProt. each) BID  -Encourage good PO intake  -Honor food preferences as able  -Weekly wts  -Monitor chemistry, GI function, and skin integrity

## 2024-12-05 NOTE — ADVANCED PRACTICE NURSE CONSULT - ASSESSMENT
Pt awake, alert, 1 person assist to turn. Continent of stool on bed pan. Oral diet    Sacrum intact with blanchable pink skin. Mild intact perianal MASD.   Bilateral hips, back, heels intact.

## 2024-12-05 NOTE — PROGRESS NOTE ADULT - ASSESSMENT
sCr stable ~2.2; Cystatin C 2.5  Last 24h uop: 1.9L  Pre Dias UA with improvement of hematuria.   Hyponatremia now at goal with Na 129meq.   Hypokalemia resolved.       F/u labs:   LDH wnl, Hapto 184, Hepatitis panel negative, no schistocytes reported, Bilirrubin wnl and Hgb and Platelets stable: TMA was r/o.   Immunoelectrophoresis with Kappa elevated and Lambda wnl, with FLC 8.49  dsDNA, ANCA, HIV and Hepatitis panel negative, C4 wnl, C3 elevated.   Legionella neg  UPCR 7.1; ACR 4.7  FeK is elevated, ~18.1%, suggestive of renal losses, HCO3 is wnl(no RTA)  TSH wnl  Non am Cortisol levels elevated.   U. acid 5.2  Renal US: No evidence of a significant renal artery stenosis. Chronic medical renal disease bilaterally. Nonobstructing right renal stone 1.1 cm.      [facility] chart review:   - (3/2024) 24H Urine protein -1710, 75.2% paraprot excretion/24h.   - 2/27/2024- 24H urine- 5-HIAA- 3.6 (WNL), Metanephrines- 35 (), normetanephrine- 122 (156-729), MPO neg, PR3 neg  **Pheochromocytoma was r/o already.     11/21/2024 KFLC 272.2, LFLC 28.4. K/L 9.58, M spike+, IgG 1089, SyA481, IgM 72.   ** Suggestive of MM vs MGRS vs MGUS.     6/3/2024- Renal biopsy  -H&E- (1) segmental and global glomerulosclerosis, (2) diffuse global glomerulosclerosis (63%), w/ moderate to marked tubular atrophy & interstitial fibrosis w/ marked arterial and arteriolar sclerosis  - LM  16/24 glomeruli: globally sclerotic or show chronic hypoperfusion, 6/24: mild to moderate mesangial prominence, 2/24: segmental sclerosis, several bhavik show segmental "double contour" formation  - IF  4/6 globally sclerotic, Igm, Kappa, Lambda: trace granular mesagngial staining, IgG, IgA, C1q: no stain, 1+ C3- along tubular BM and in vessels, Casts- bitypic for both light chains. albumin neg, Fibrin neg  - EM 1/2 segmental sclerosis w/ subepithelial reparative changes as well as globular deposits consistent w/ hyaline w/ focal foot effacement.  GBM thickness mildly increaed, Subendothelial space:  no electron lucent expansion, No dense deposits. Mesangium: small electron dense deposits. Foci of mesangial interposition. No amyloid.     **These findings are suggestive of advanced CKD with IFTA, and possible Nephrosclerosis, matches with secondary FSGS and will explain the nephrotic syndrome.   Need full biopsy report.     -Imp: Non oliguric LILIAN, suspected iATN on CKD3/4.   Nephrotic Syndrome, suspected FSGS vs FRED vs MN.   Also FLC 8.49, suspected MGUS vs MGRS/MM(less likely).    -Plan:  Obtain serum/urine Immunofixation.   Consider Hematology consult, suspected MGUS vs MM/MGRS.   F/u GN panel: Cryoglobulins, MPO, PR3,  anti GBM. Also PLA2R levels.  Follow Aldosterone and Renin levels, also Renin plasma activity.   Fluid restriction<1.2L daily for now. Encourage po solute intake. No need for hypertonic saline with Na 129 this am.   Consider obtaining 24h urinary free Cortisol vs bedtime salivary cortisol or 1mg dexamethasone suppression test for Cushing/hypercortisolism r/o.   Repeat UA with microscopy today.   Strict I&O. Keep Dias placed. Consider Urology eval also for nonobstructing right renal stone 1.1 cm  Avoid hypotension or sudden SBP drops. Aim for SBP~140, for now. If SBP>160 consistently can start low dose Nifedipine and titrate as need it.   Pain/temperature management as per primary team, avoid nephrotoxic agents.   Renally dosed abx.   Not considering renal biopsy at this point. Please obtain full K. biopsy report from [facility].   No indication for RRT at this point.

## 2024-12-05 NOTE — DIETITIAN INITIAL EVALUATION ADULT - NSFNSPHYEXAMSKINFT_GEN_A_CORE
Pressure Injury 1: sacrum, Stage I  Pressure Injury 2: Right:, greater trochanter (hip), Stage I  Pressure Injury 3: none, none  Pressure Injury 4: none, none  Pressure Injury 5: none, none  Pressure Injury 6: none, none  Pressure Injury 7: none, none  Pressure Injury 8: none, none  Pressure Injury 9: none, none  Pressure Injury 10: none, none  Pressure Injury 11: none, none, Pressure Injury 1: sacrum, Stage I  Pressure Injury 2: Right:, greater trochanter (hip), Stage I  Pressure Injury 3: none, none  Pressure Injury 4: none, none  Pressure Injury 5: none, none  Pressure Injury 6: none, none  Pressure Injury 7: none, none  Pressure Injury 8: none, none  Pressure Injury 9: none, none  Pressure Injury 10: none, none  Pressure Injury 11: none, none

## 2024-12-05 NOTE — DIETITIAN INITIAL EVALUATION ADULT - NUTRITIONGOAL OUTCOME1
Pt will meet 75% or more of protein/energy needs via most appropriate route for nutrition and reduce/resolve s/sx protein-calorie malnutrition.  Low Dose Naltrexone Pregnancy And Lactation Text: Naltrexone is pregnancy category C.  There have been no adequate and well-controlled studies in pregnant women.  It should be used in pregnancy only if the potential benefit justifies the potential risk to the fetus.   Limited data indicates that naltrexone is minimally excreted into breastmilk.

## 2024-12-05 NOTE — DIETITIAN INITIAL EVALUATION ADULT - OTHER CALCULATIONS
Needs determined using current body weight 67.9 kg (81%IBW) adjusted for protein-calorie malnutrition.

## 2024-12-05 NOTE — DIETITIAN INITIAL EVALUATION ADULT - PERTINENT LABORATORY DATA
12-05    129[L]  |  99  |  22  ----------------------------<  108[H]  4.4   |  19[L]  |  2.22[H]    Ca    9.1      05 Dec 2024 05:30  Phos  2.5     12-05  Mg     2.2     12-05    TPro  5.7[L]  /  Alb  2.8[L]  /  TBili  0.4  /  DBili  x   /  AST  23  /  ALT  12  /  AlkPhos  105  12-05  A1C with Estimated Average Glucose Result: 5.9 % (12-04-24 @ 05:30)  
normal...

## 2024-12-05 NOTE — PROGRESS NOTE ADULT - SUBJECTIVE AND OBJECTIVE BOX
Evaluated at bedside, stable, non oliguric, ?sCr plateauing ~ 2.2.     PHYSICAL EXAM:  General:NAD.   Cardiovascular: +S1/S2, RRR  Respiratory: CTA B/L; no W/R/R  Gastrointestinal: soft, NT/ND; +BSx4  Extremities:  no edema  Neurological: AAOx3; no focal deficits  : Dias +    VITALS:  T(F): 97.8 (12-05-24 @ 09:23), Max: 98.5 (12-05-24 @ 01:00)  HR: 96 (12-05-24 @ 08:30)  BP: 163/111 (12-05-24 @ 08:30)  RR: 17 (12-05-24 @ 08:30)  SpO2: 96% (12-05-24 @ 08:30)  Wt(kg): --    12-03 @ 07:01  -  12-04 @ 07:00  --------------------------------------------------------  IN: 0 mL / OUT: 2550 mL / NET: -2550 mL    12-04 @ 07:01  -  12-05 @ 07:00  --------------------------------------------------------  IN: 580 mL / OUT: 1925 mL / NET: -1345 mL          LABS:  12-05    129[L]  |  99  |  22  ----------------------------<  108[H]  4.4   |  19[L]  |  2.22[H]    Ca    9.1      05 Dec 2024 05:30  Phos  2.5     12-05  Mg     2.2     12-05    TPro  5.7[L]  /  Alb  2.8[L]  /  TBili  0.4  /  DBili      /  AST  23  /  ALT  12  /  AlkPhos  105  12-05                          11.2   7.18  )-----------( 146      ( 05 Dec 2024 05:30 )             34.8         acetaminophen     Tablet .. 650 milliGRAM(s) Oral every 6 hours PRN  albuterol/ipratropium for Nebulization 3 milliLiter(s) Nebulizer every 6 hours  aspirin  chewable 81 milliGRAM(s) Oral daily  atorvastatin 80 milliGRAM(s) Oral at bedtime  famotidine    Tablet 20 milliGRAM(s) Oral every 48 hours  finasteride 5 milliGRAM(s) Oral daily  fluticasone propionate 50 MICROgram(s)/spray Nasal Spray 1 Spray(s) Both Nostrils two times a day  fluticasone propionate/ salmeterol 100-50 MICROgram(s) Diskus 1 Dose(s) Inhalation two times a day  heparin   Injectable 5000 Unit(s) SubCutaneous every 8 hours  loperamide Liquid 2 milliGRAM(s) Oral every 8 hours  naphazoline/pheniramine Solution 2 Drop(s) Both EYES two times a day  ondansetron    Tablet 4 milliGRAM(s) Oral every 8 hours PRN  pancrelipase  (CREON 36,000 Lipase Units) 3 Capsule(s) Oral three times a day with meals  piperacillin/tazobactam IVPB.. 4.5 Gram(s) IV Intermittent every 8 hours  sirolimus 2 milliGRAM(s) Oral every 24 hours  sodium chloride 3%  Inhalation 4 milliLiter(s) Inhalation every 12 hours  tamsulosin 0.4 milliGRAM(s) Oral every 24 hours  tiotropium 2.5 MICROgram(s) Inhaler 2 Puff(s) Inhalation daily

## 2024-12-05 NOTE — PROGRESS NOTE ADULT - ASSESSMENT
79y Male with PMHx of COPD, CKD stage 4, Crohn's disease hx of liver transplant presents to [facility] ED for fall and hypertension. NIHSS 4, notable for LUE/LLE dysmetria. CT imaging unremarkable. Labs with metabolic derangements (hyponatremic to 118, hypokalemic to 2.9). Carotid U/S showed: Mild atheromatous plaque bilaterally without evidence of hemodynamically significant stenosis (less than 50%)    Impression: given focal findings on limited exam, stroke should be ruled out.     CT head showed: no acute ischemic stroke or hemorrhage   CTA/CTP: calcified and noncalcified plaques of b/l ICA (less than 50%)   inital NIHSS: 4  current NIHSS: 4    1)Secondary stroke prevention  - continue ASA 81mg PO daily in the setting of ICAD  - continue Atorvastatin 80mg PO daily in the setting of ICAD    2) Stroke risk factors  - HTN  - ICAD     3) Further management  - obtain MRI brain without to r/o stroke    - please obtain A1C, LDL   - recommend SBP goal <180  - recommend q1hr stroke neuro checks  - may need outpt neurology follow up  - provide stroke education    DVT prophylaxis   -heparin 5000 U SQ and SCDs    Case discussed with Dr. Romi Fernando  79y Male with PMHx of COPD, CKD stage 4, Crohn's disease hx of liver transplant presents to [facility] ED for fall and hypertension. NIHSS 4, notable for LUE/LLE dysmetria. CT imaging unremarkable. Labs with metabolic derangements (hyponatremic to 118, hypokalemic to 2.9). Carotid U/S showed: Mild atheromatous plaque bilaterally without evidence of hemodynamically significant stenosis (less than 50%)    Impression: given focal findings on limited exam, stroke should be ruled out.     CT head showed: no acute ischemic stroke or hemorrhage   CTA/CTP: calcified and noncalcified plaques of b/l ICA (less than 50%)   current NIHSS: 5    1)Secondary stroke prevention  - continue ASA 81mg PO daily in the setting of ICAD  - continue Atorvastatin 80mg PO daily in the setting of ICAD    2) Stroke risk factors  - HTN  - ICAD     3) Further management  - obtain MRI brain without to r/o stroke    - A1C: 5.9, LDL: 17  - recommend SBP goal <180  - recommend q1hr stroke neuro checks  - may need outpt neurology follow up  - provide stroke education    DVT prophylaxis   -heparin 5000 U SQ and SCDs    Case discussed with Dr. Romi Fernando

## 2024-12-05 NOTE — DIETITIAN INITIAL EVALUATION ADULT - LAB (SPECIFY)
Monitor K, Mg, and Phos for s/sx refeeding syndrome; replete as necessary -consider thiamine supplementation

## 2024-12-05 NOTE — ADVANCED PRACTICE NURSE CONSULT - RECOMMEDATIONS
Continue Lia cream with each cleansing to perianal skin  Continue pressure injury prevention measures  Preventative foam to sacrum    Discussed with primary RN    Please reconsult if new wound care concerns arise.    Total time spent: 20 minutes

## 2024-12-05 NOTE — DIETITIAN INITIAL EVALUATION ADULT - OTHER INFO
79y year old Male with a PMHx significant for hepatitis C s/p liver transplant in 3/2019 for cryptogenic cirrhosis (on sirolimus for 5 years), unspecified autonomic nervous system disorder w/ hx of orthostasis, COPD, Mycobacterium avium complex lung disease, chronic anemia, amaurosis fugax, Crohn's disease, BPH, and unspecified pancreatic insufficiency. Patient presented to the ED on 12/2 after having fallen at home with a head-strike, without loss of consciousness. Found to be hyponatremic to 118 and found to have hypertensive urgency on presentation. Admitted for further medical management of blood pressure and hyponatremia.    Pt assessed in room on 7LA. Rx and labs reviewed. Pt presents with moderate orbital, temporal, buccal, and clavicular wasting. Pt received resting in bed resting; attempted to wake pt for nutrition interview, but pt unresponsive to verbal cues -AA/O x3 per chart. Pt initially failed bedside dysphagia and nasogastric tube was place; RDN was consulted for enteral nutrition support recommendations. Today, pt cleared for soft/bite-sized diet after FEES with SLP yesterday and nasogastric tube subsequently removed. Reported variable PO intake of advanced diet. Recommend Ensure BID to diet order. Pt meets ASPEN criteria for severe, chronic protein-calorie malnutrition in setting of significant muscle/fat wasting. No reported complaints of nausea/vomiting/constipation/diarrhea or difficult chew/swallow with current therapeutic textures. NKA to food per chart review. RDN to remain available, see recommendations below.     Pain: 8/10, generalized   GI: Abdomen non-distended/non-tender, +BS x4, last bowel movement 12/5 -green   Skin: pressure injury stage I sacrum, pressure injury stage I L hip, +3 L arm

## 2024-12-05 NOTE — DIETITIAN INITIAL EVALUATION ADULT - PERTINENT MEDS FT
MEDICATIONS  (STANDING):  albuterol/ipratropium for Nebulization 3 milliLiter(s) Nebulizer every 6 hours  aspirin  chewable 81 milliGRAM(s) Oral daily  atorvastatin 80 milliGRAM(s) Oral at bedtime  famotidine    Tablet 20 milliGRAM(s) Oral <User Schedule>  fluticasone propionate/ salmeterol 100-50 MICROgram(s) Diskus 1 Dose(s) Inhalation two times a day  heparin   Injectable 5000 Unit(s) SubCutaneous every 8 hours  loperamide 2 milliGRAM(s) Oral <User Schedule>  naphazoline/pheniramine Solution 2 Drop(s) Both EYES two times a day  pancrelipase  (CREON 36,000 Lipase Units) 3 Capsule(s) Oral three times a day with meals  piperacillin/tazobactam IVPB.. 4.5 Gram(s) IV Intermittent every 8 hours  sirolimus 2 milliGRAM(s) Oral every 24 hours  sodium chloride 3%  Inhalation 4 milliLiter(s) Inhalation every 12 hours  tamsulosin 0.4 milliGRAM(s) Oral every 24 hours  tiotropium 2.5 MICROgram(s) Inhaler 2 Puff(s) Inhalation daily    MEDICATIONS  (PRN):  acetaminophen     Tablet .. 650 milliGRAM(s) Oral every 6 hours PRN Mild Pain (1 - 3), Moderate Pain (4 - 6)  ondansetron    Tablet 4 milliGRAM(s) Oral every 8 hours PRN Nausea and/or Vomiting

## 2024-12-05 NOTE — PROGRESS NOTE ADULT - SUBJECTIVE AND OBJECTIVE BOX
Neurology Stroke Progress Note    INTERVAL HPI/OVERNIGHT EVENTS:  Patient seen and examined. Patient was having breakfast and conversant. Stated that he felt overall better from his initial presentation. Attempted to collect more information about his fall, but patient states he didn't remember much about what happened. Attempted to call his wife, but he couldn't reach her at the time. Ultrasound showed Mild atheromatous plaque bilaterally without evidence of hemodynamically significant stenosis (less than 50%).       MEDICATIONS  (STANDING):  albuterol/ipratropium for Nebulization 3 milliLiter(s) Nebulizer every 6 hours  aspirin  chewable 81 milliGRAM(s) Oral daily  atorvastatin 80 milliGRAM(s) Oral at bedtime  famotidine    Tablet 20 milliGRAM(s) Oral every 48 hours  finasteride 5 milliGRAM(s) Oral daily  fluticasone propionate 50 MICROgram(s)/spray Nasal Spray 1 Spray(s) Both Nostrils two times a day  fluticasone propionate/ salmeterol 100-50 MICROgram(s) Diskus 1 Dose(s) Inhalation two times a day  heparin   Injectable 5000 Unit(s) SubCutaneous every 8 hours  loperamide Liquid 2 milliGRAM(s) Oral every 8 hours  naphazoline/pheniramine Solution 2 Drop(s) Both EYES two times a day  pancrelipase  (CREON 36,000 Lipase Units) 3 Capsule(s) Oral three times a day with meals  piperacillin/tazobactam IVPB.. 4.5 Gram(s) IV Intermittent every 8 hours  sirolimus 2 milliGRAM(s) Oral every 24 hours  sodium chloride 3%  Inhalation 4 milliLiter(s) Inhalation every 12 hours  tamsulosin 0.4 milliGRAM(s) Oral every 24 hours  tiotropium 2.5 MICROgram(s) Inhaler 2 Puff(s) Inhalation daily    MEDICATIONS  (PRN):  acetaminophen     Tablet .. 650 milliGRAM(s) Oral every 6 hours PRN Mild Pain (1 - 3), Moderate Pain (4 - 6)  ondansetron    Tablet 4 milliGRAM(s) Oral every 8 hours PRN for nausea      Allergies    NSAIDs (Pruritus)  Aleve (Unknown)    Intolerances        Vital Signs Last 24 Hrs  T(C): 36.5 (05 Dec 2024 05:46), Max: 36.9 (05 Dec 2024 01:00)  T(F): 97.7 (05 Dec 2024 05:46), Max: 98.5 (05 Dec 2024 01:00)  HR: 96 (05 Dec 2024 08:30) (86 - 102)  BP: 163/111 (05 Dec 2024 08:30) (137/91 - 163/111)  BP(mean): 133 (05 Dec 2024 08:30) (109 - 133)  RR: 17 (05 Dec 2024 08:30) (16 - 18)  SpO2: 96% (05 Dec 2024 08:30) (95% - 99%)    Parameters below as of 05 Dec 2024 08:30  Patient On (Oxygen Delivery Method): nasal cannula w/ humidification  O2 Flow (L/min): 2      Physical exam:  General: No acute distress, awake and alert  Eyes: Anicteric sclerae, moist conjunctivae, see below for CNs  Neck: trachea midline,  Pulmonary: No use of accessory muscles  GI: Abdomen soft, non-distended, non-tender  Extremities: no edema, bruising on the L forearm    Neurologic:  -Mental status: Awake, alert, oriented to person, place, and time. Speech is fluent with intact naming, repetition, and comprehension, mild dysarthria. Hypophonic voice. Recent and remote memory intact. Follows simple and complex commands. Attention/concentration intact. Fund of knowledge appropriate.  -Cranial nerves:   II: Visual fields are full to confrontation.  III, IV, VI: Extraocular movements are intact without nystagmus. Pupils equally round and reactive to light  V:  Facial sensation V1-V3 equal and intact   VII: L sided facial asymmetry  VIII: Hearing is grossly intact to voice  XI: Head turning and shoulder shrug are intact.  XII: Tongue protrudes midline  Motor: Normal bulk and tone. No pronator drift. Strength LUE 4/5 with drift, RUE 5/5, bilateral lower extremities 5/5.  Sensation: Intact to light touch bilaterally. No neglect or extinction on double simultaneous testing.  Coordination: mild dysmetria on finger-to-nose b/l, worse on the LUE     NIHSS: 4     LABS:                        11.2   7.18  )-----------( 146      ( 05 Dec 2024 05:30 )             34.8     12-05    129[L]  |  99  |  22  ----------------------------<  108[H]  4.4   |  19[L]  |  2.22[H]    Ca    9.1      05 Dec 2024 05:30  Phos  2.5     12-05  Mg     2.2     12-05    TPro  5.7[L]  /  Alb  2.8[L]  /  TBili  0.4  /  DBili  x   /  AST  23  /  ALT  12  /  AlkPhos  105  12-05      Urinalysis Basic - ( 05 Dec 2024 05:30 )    Color: x / Appearance: x / SG: x / pH: x  Gluc: 108 mg/dL / Ketone: x  / Bili: x / Urobili: x   Blood: x / Protein: x / Nitrite: x   Leuk Esterase: x / RBC: x / WBC x   Sq Epi: x / Non Sq Epi: x / Bacteria: x        RADIOLOGY & ADDITIONAL TESTS:    < from: US Duplex Carotid Arteries Complete, Bilateral (12.04.24 @ 22:19) >  IMPRESSION: Mild atheromatous plaque bilaterally without evidence of   hemodynamically significant stenosis (less than 50%)    Measurement of carotid stenosis is based on updated recommendations for   carotid stenosis interpretation criteria from the Intersocietal   Accreditation Commission (IAC) Vascular Testing Board, modified from the   Society of Radiologists in Ultrasound (SRU) Consensus Conference Criteria   for Internal Carotid Artery Stenosis.    --- End of Report ---    < end of copied text >   Neurology Stroke Progress Note    INTERVAL HPI/OVERNIGHT EVENTS:  Patient seen and examined. Patient was having breakfast and conversant. Stated that he felt overall better from his initial presentation. Attempted to collect more information about his fall, but patient states he didn't remember much about what happened. Attempted to call his wife, but he couldn't reach her at the time. Ultrasound showed Mild atheromatous plaque bilaterally without evidence of hemodynamically significant stenosis (less than 50%).       MEDICATIONS  (STANDING):  albuterol/ipratropium for Nebulization 3 milliLiter(s) Nebulizer every 6 hours  aspirin  chewable 81 milliGRAM(s) Oral daily  atorvastatin 80 milliGRAM(s) Oral at bedtime  famotidine    Tablet 20 milliGRAM(s) Oral every 48 hours  finasteride 5 milliGRAM(s) Oral daily  fluticasone propionate 50 MICROgram(s)/spray Nasal Spray 1 Spray(s) Both Nostrils two times a day  fluticasone propionate/ salmeterol 100-50 MICROgram(s) Diskus 1 Dose(s) Inhalation two times a day  heparin   Injectable 5000 Unit(s) SubCutaneous every 8 hours  loperamide Liquid 2 milliGRAM(s) Oral every 8 hours  naphazoline/pheniramine Solution 2 Drop(s) Both EYES two times a day  pancrelipase  (CREON 36,000 Lipase Units) 3 Capsule(s) Oral three times a day with meals  piperacillin/tazobactam IVPB.. 4.5 Gram(s) IV Intermittent every 8 hours  sirolimus 2 milliGRAM(s) Oral every 24 hours  sodium chloride 3%  Inhalation 4 milliLiter(s) Inhalation every 12 hours  tamsulosin 0.4 milliGRAM(s) Oral every 24 hours  tiotropium 2.5 MICROgram(s) Inhaler 2 Puff(s) Inhalation daily    MEDICATIONS  (PRN):  acetaminophen     Tablet .. 650 milliGRAM(s) Oral every 6 hours PRN Mild Pain (1 - 3), Moderate Pain (4 - 6)  ondansetron    Tablet 4 milliGRAM(s) Oral every 8 hours PRN for nausea      Allergies    NSAIDs (Pruritus)  Aleve (Unknown)    Intolerances        Vital Signs Last 24 Hrs  T(C): 36.5 (05 Dec 2024 05:46), Max: 36.9 (05 Dec 2024 01:00)  T(F): 97.7 (05 Dec 2024 05:46), Max: 98.5 (05 Dec 2024 01:00)  HR: 96 (05 Dec 2024 08:30) (86 - 102)  BP: 163/111 (05 Dec 2024 08:30) (137/91 - 163/111)  BP(mean): 133 (05 Dec 2024 08:30) (109 - 133)  RR: 17 (05 Dec 2024 08:30) (16 - 18)  SpO2: 96% (05 Dec 2024 08:30) (95% - 99%)    Parameters below as of 05 Dec 2024 08:30  Patient On (Oxygen Delivery Method): nasal cannula w/ humidification  O2 Flow (L/min): 2      Physical exam:  General: No acute distress, awake and alert  Eyes: Anicteric sclerae, moist conjunctivae, see below for CNs  Neck: trachea midline,  Pulmonary: No use of accessory muscles  GI: Abdomen soft, non-distended, non-tender  Extremities: no edema, bruising on the L forearm    Neurologic:  -Mental status: Awake, alert, oriented to person, place, and time. Speech is fluent with intact naming, repetition, and comprehension, mild dysarthria. Hypophonic voice. Recent and remote memory intact. Follows simple and complex commands. Attention/concentration intact. Fund of knowledge appropriate.  -Cranial nerves:   II: Visual fields are full to confrontation.  III, IV, VI: Extraocular movements are intact without nystagmus. Pupils equally round and reactive to light  V:  Facial sensation V1-V3 equal and intact   VII: L sided facial asymmetry  VIII: Hearing is grossly intact to voice  XI: Head turning and shoulder shrug are intact.  XII: Tongue protrudes midline  Motor: Normal bulk and tone. No pronator drift. Strength LUE 4/5 with drift, RUE 5/5, bilateral lower extremities 5/5.  Sensation: Intact to light touch bilaterally. No neglect or extinction on double simultaneous testing.  Coordination: mild dysmetria on finger-to-nose b/l, worse on the LUE     NIHSS: 5     LABS:                        11.2   7.18  )-----------( 146      ( 05 Dec 2024 05:30 )             34.8     12-05    129[L]  |  99  |  22  ----------------------------<  108[H]  4.4   |  19[L]  |  2.22[H]    Ca    9.1      05 Dec 2024 05:30  Phos  2.5     12-05  Mg     2.2     12-05    TPro  5.7[L]  /  Alb  2.8[L]  /  TBili  0.4  /  DBili  x   /  AST  23  /  ALT  12  /  AlkPhos  105  12-05      Urinalysis Basic - ( 05 Dec 2024 05:30 )    Color: x / Appearance: x / SG: x / pH: x  Gluc: 108 mg/dL / Ketone: x  / Bili: x / Urobili: x   Blood: x / Protein: x / Nitrite: x   Leuk Esterase: x / RBC: x / WBC x   Sq Epi: x / Non Sq Epi: x / Bacteria: x        RADIOLOGY & ADDITIONAL TESTS:    < from: US Duplex Carotid Arteries Complete, Bilateral (12.04.24 @ 22:19) >  IMPRESSION: Mild atheromatous plaque bilaterally without evidence of   hemodynamically significant stenosis (less than 50%)    Measurement of carotid stenosis is based on updated recommendations for   carotid stenosis interpretation criteria from the Intersocietal   Accreditation Commission (IAC) Vascular Testing Board, modified from the   Society of Radiologists in Ultrasound (SRU) Consensus Conference Criteria   for Internal Carotid Artery Stenosis.    --- End of Report ---    < end of copied text >

## 2024-12-05 NOTE — PROGRESS NOTE ADULT - ASSESSMENT
WANDER VAZQUEZ is a 79y year old Male with a PMHx significant for hepatitis C s/p liver transplant in 3/2019 for cryptogenic cirrhosis (on sirolimus for 5 years), unspecified autonomic nervous system disorder w/ hx of orthostasis, COPD, Mycobacterium avium complex lung disease, chronic anemia, amaurosis fugax, Crohn's disease, BPH, and unspecified pancreatic insufficiency. Patient presented to the ED on 12/2 after having fallen at home with a head-strike, without loss of consciousness. Found to be hyponatremic to 118 and found to have hypertensive urgency on presentation. Admitted for further medical management of blood pressure and hyponatremia.     NEURO:  #Metabolic Encephalopathy (RESOLVED)  Patient presented to the ED with AMS in the setting of hyponatremia and hypertensive urgency (-240). AMS improved to baseline A&Ox3 w/blood pressure control. AMS likely 2/2 cerebral edema in the setting of hypertensive urgency.     #Concern for Stroke  Stroke called in ED for AMS and apparent facial droop. Pt states he felt dizzy prior to him falling in the shower.   NIHSS 4, notable for LUE/LLE dysmetria.   CT brain, CTA large vessel study showing no acute hemorrhages or occlusions. Low concern for stroke given improved AMS and metabolic derangements   - Stroke consulted, appreciate recs       - repeat imaging CTH or MRI if symptoms do not improve    CARDIOVASCULAR:  #Hypertensive Urgency with Encephalopathy (IMPROVING)  Patient presented to ED with blood pressure 200/127. S/p nicardipine gtt and lasix 60mg IV in ED. Blood pressure lowered by presentation to floor to 149/92. No longer encephalopathic. Elevated blood pressure could be 2/2 pain w/ head strike.   - continue to monitor blood pressure   /p IV Labetol 10mg x1, ctm BP.    #Troponinemia   Patient presented with an elevated troponin to 115. Tachycardic on presentation. EKG with no ischemic changes, Bedside POCUS showing no wall motion abnormalities. Elevated troponin in the setting of demand ischemia and CKD.    - trend troponin to peak    #HFmREF  [facility] my chart accessed on daughters phone  - Myocardial SPECT-  LVEF 44%, EKG- SR, rare PVC (11/2024)  - Tc-99 PYP amyloid scan- negative for TTR amyloid (10/29/2024)  - US Heart- small pericardial effusion (9/5/2024)  - pRO-bnp-2080 (9/22/2024)  Patient with elevated BNP to 22,740. Patient wife reports hx of elevated BNP to 35k. Bedside POCUS showing an EF of ˜45%, no wall motion abnormalities, w/ dilated IVC on echo. Patient is s/p lasix 60mg IV in ED. Follows at [facility] cardiology.     - Strict Is&Os   - f/u formal TTE  - hold on additional diuresis for now    #Hypertensive emergency  p/w fall w/ garbled speech w/ /140 in the field and 200/127 in ED, managed w/ cardine drip initially and currently on labetolol IV PRN.    [facility] my chart accessed on [employer] phone  - aldosterone <1, plasma renin 0.511    - Reduce BP by 25% IN 24 hrs  - ctm vitals  - fu US Abdomen w/ doppler to r/o SUKHJINDER,   - fu Plasma renin activity and aldosterone level to r/o hyperaldosteronism    PULMONARY:  #COPD  #MAC  # R/O lung malignancy  #R/O active TB  Presentation: iWOB, recent sick contact: wife (ROBI), BiPAP ---->NC. Home meds: Advair, Spiriva, hypertonic saline. Does not use home O2.   CT showing b/l cavitary+solid lung lesion concerning for primary lung malignancy, RUL s/p wedge resection, cannot exclude TB or fungal etiology w/ small left pleural effusion and Diffuse interstitial pulmonary edema.    [facility] my chart accessed on [employer] phone  -11/21/2024- Sputum AFB smear+, c/s Mycobacterium abscessus+ (also positive in june and july 2024)  - 07/02/2024- candida albicans+  - 9/5/2024- V/Q scan- low probability of PE  - 05/2024- Histoplasma neg, aspergillus neg, fungitell neg  - 3/2024- CT Chest wo contrast: waxing and waning nodular opacities b/l w/ enlarging ALEC 1.8cm nodule, RUL cavity stable and consistent with Chronic MAC.   - Treated for Mac for 8 weeks in summer 2023 but d/c'd given side effects mary anne with rifampin including diarrhea and orthostasis which are still ongoing.    - wean O2 as tolerated for a goal O2 saturation 82-92% given COPD  - continue to monitor respiratory status  - Pulmonology consult to establish dx: infectious vs autoimmune vs malignancy etiology  - fu sputum culture, induced sputum culture, strep PNA, Legionella, RSV, MRSA, Galactomannan, fungitell  - fu autoimune hairston to r/o GPA, sarcoid  - c/w Vanc+azithromycin_ zosyn 4.5g to cover NADIR  - fu pulmonology recs  - fu MRI Brain to r/o brain mets    #Pulmonary Nodules  CT chest with a preliminary read showing multiple cavitary and soft tissue mass lesions in bilateral lungs.   - f/u outpatient for workup  - PET neg for Cancer    #Nasal wall ulcer  2017 BX- SCC+    RENAL:  #Hyponatremia  Na+ 118 on presentation. Normal sodium in early November. Serum osmolality 260, urine osmolality 283, urine sodium 57. Hypovolemic on physical exam: dry MM and decreased skin turgor.  Etiology: Could be SIADH since Na improved from 118 to 121 w/ fluid restriction, Given hypovolemic physical exam, could be hypovolemic hyponatremia, CSW is a possibility given hypovolemic physical exam findings and p/w fall w/ headstrike but unlikely.    - Hypertonic saline w/ goal Na increase of 6-8meq over 24h  - continue to monitor w/ Q4H BMP and q12h urine studies  - fu nephrology recs  -TSH and AM cortisol WNL  - strict I and O    #Hypokalemia  #Hypomagnesemia  Likely in the setting of poor PO intake  Continue to monitor, replete as needed    #CKD   Patient with a hx of CKD, Hep C, Liver tx. Not currently getting dialysis. Baseline Cr 2.74 in early November.   UA- Protein>1000, RBC-26 represents a GN w/ nephritic range proteinuria    [facility] my chart accessed on daughters phone  - 11/21/2024 KFLC-272.2, LFLC- 28.4. K/L- 9.58, m-spike+, IgG-1089, iGa-211, iGm-72  - 11/21/2024  Uric acid-  6.7, LDH- 185  - 6/3/2024- Renal biopsy-                          - H&E- (1) segmental and global glomerulosclerosis, (2) diffuse global glomerulosclerosis (63%), w/ moderate to marked tubular atrophy & interstitial fibrosis w/ marked arterial and arteriolar sclerosis                         - LM - 16/24 glomeruli: globally sclerotic or show chronic hypoperfusion, 6/24: mild to moderate mesangial prominence, 2/24: segmental sclerosis, several bhavik show segmental "double contour" formation                         - IF - 4/6 globally sclerotic, Igm, Kappa, Lambda: trace granular mesagngial staining, IgG, IgA, C1q: no stain, 1+ C3- along tubular BM and in vessels, Casts- bitypic for both light chains. albumin neg, Fibrin neg                         - EM- 1/2 segmental sclerosis w/ subepithelial reparative changes as well as globular deposits consistent w/ hyaline w/ focal foot effacement.  GBM thickness mildly increaed, Subendothelial space:  no electron lucent expansion, No dense deposits. Mesangium: small electron dense deposits. Foci of mesangial interposition. No amyloid.   - 5/7/2024- iPTH- 112  - (3/2024) 24H Urine protein -1710, 75.2% paraprot excretion/24h  - 2/27/2024- 24H urine- 5-HIAA- 3.6 (WNL), Metanephrines- 35 (), normetanephrine- 122 (156-729), MPO neg, PR3 neg    - fu GN workup: Autoimmune (LAWANDA, ANCA, C3, C4, Cryoglobulin, anti-GBM ds, dsDNA), infection (HIV, Hep B, Hep C),   - continue to monitor  - avoid nephrotoxic agents       GI/:  #Liver transplant  S/p liver transplant in 3/2019 for cryptogenic cirrhosis. Home sirolimus 2mg QD.   - c/w sirolimus 2mg QD    #Unspecified Pancreatic Insufficiency   #Diarrhea  Elevated Alk Phos to 148 on admission. Likely elevated in the setting of pancreatic insufficiency.     [facility] my chart accessed on daughters phone  12/22/2023 MRI/MRCP abdomen w/ wo IV con: few pancreatic cystic lesions w/ indolent growth 1.6cm within pancreatic tail. no suspicious internal features, no main uct dilation. No solid mass  - C-Peptide- 10.4 (range 1.1-4.4)  - Fructosamine: 338 (0-285)  - low fecal elastatse 10/2023  - CMV+ 10/2023  - f/u GGT   - c/w home Creon   - c/w home loperamide     #NPO   Patient did not pass bedside dysphagia overnight 12/2-12/3. Patient NPO for now.   - Will reassess in AM    HEME:  #Chronic Anemia   Hemoglobin 11.8 on presentation. Could be 2/2 anemia of chronic disease or iron deficiency given CKD.   - continue to monitor    #MGUS  - elevated FLC w/ negative BM bx (7/2023)    ID:  #SIRS  #Leukocytosis  Met SIRS with tachypnea, tachycardia, and leukocytosis. Afebrile. Did have recent illness of URI. Low suspicion for bacterial infection at this time.   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 (Range, 2-96)                     PPX:  Fluids: fluid restriction of < 1L  Electrolytes: replete as needed  Nutrition: Diet, NPO:   Except Medications (12-03-24 @ 04:43) [Active]  PPI ppx: home famotidine 20mg BID  Dvt ppx: heparin 5000units subQ Q8H  Activity: fall precautions   Code Status: full code   WANDER VAZQUEZ is a 79y year old Male with a PMHx significant for hepatitis C s/p liver transplant in 3/2019 for cryptogenic cirrhosis (on sirolimus for 5 years), unspecified autonomic nervous system disorder w/ hx of orthostasis, COPD, Mycobacterium avium complex lung disease, chronic anemia, amaurosis fugax, Crohn's disease, BPH, and unspecified pancreatic insufficiency. Patient presented to the ED on 12/2 after having fallen at home with a head-strike, without loss of consciousness. Found to be hyponatremic to 118 and found to have hypertensive urgency on presentation. Admitted for further medical management of blood pressure and hyponatremia.     NEURO:  #Metabolic Encephalopathy (RESOLVED)  Patient presented to the ED with AMS in the setting of hyponatremia and hypertensive urgency (-240). AMS improved to baseline A&Ox3 w/blood pressure control. AMS likely 2/2 cerebral edema in the setting of hypertensive urgency.     #Concern for Stroke  Stroke called in ED for AMS and apparent facial droop. Pt states he felt dizzy prior to him falling in the shower.   NIHSS 4, notable for LUE/LLE dysmetria.   CT brain, CTA large vessel study showing no acute hemorrhages or occlusions. Low concern for stroke given improved AMS and metabolic derangements   - Stroke consulted, appreciate recs       - repeat imaging CTH or MRI if symptoms do not improve    CARDIOVASCULAR:  #Hypertensive Urgency with Encephalopathy (IMPROVING)  Patient presented to ED with blood pressure 200/127. S/p nicardipine gtt and lasix 60mg IV in ED. Blood pressure lowered by presentation to floor to 149/92. No longer encephalopathic. Elevated blood pressure could be 2/2 pain w/ head strike.   - continue to monitor blood pressure   /p IV Labetol 10mg x1, ctm BP.    #Troponinemia   Patient presented with an elevated troponin to 115. Tachycardic on presentation. EKG with no ischemic changes, Bedside POCUS showing no wall motion abnormalities. Elevated troponin in the setting of demand ischemia and CKD.    - trend troponin to peak    #HFmREF  [facility] my chart accessed on daughters phone  - Myocardial SPECT-  LVEF 44%, EKG- SR, rare PVC (11/2024)  - Tc-99 PYP amyloid scan- negative for TTR amyloid (10/29/2024)  - US Heart- small pericardial effusion (9/5/2024)  - pRO-bnp-2080 (9/22/2024)    Patient with elevated BNP to 22,740. Patient wife reports hx of elevated BNP to 35k. Bedside POCUS showing an EF of ˜45%, no wall motion abnormalities, w/ dilated IVC on echo. Patient is s/p lasix 60mg IV in ED. Follows at [facility] cardiology.   - EF- 35%  - Strict Is&Os   - f/u formal TTE  - hold on additional diuresis for now    #Hypertensive emergency  p/w fall w/ garbled speech w/ /140 in the field and 200/127 in ED, managed w/ cardine drip initially and currently on labetolol IV PRN.    [facility] my chart accessed on [email or web address] phone  - aldosterone <1, plasma renin 0.511    - Reduce BP by 25% IN 24 hrs  - ctm vitals  - US Abdomen w/ doppler: NO SUKHJINDER,   - Plasma renin activity and aldosterone level WNL    PULMONARY:  #COPD  #MAC  # R/O lung malignancy  #R/O active TB  Presentation: iWOB, recent sick contact: wife (ROBI), BiPAP ---->NC. Home meds: Advair, Spiriva, hypertonic saline. Does not use home O2.   CT showing b/l cavitary+solid lung lesion concerning for primary lung malignancy, RUL s/p wedge resection, cannot exclude TB or fungal etiology w/ small left pleural effusion and Diffuse interstitial pulmonary edema.    [facility] my chart accessed on [email or web address] phone  -11/21/2024- Sputum AFB smear+, c/s Mycobacterium abscessus+ (also positive in june and july 2024)  - 07/02/2024- candida albicans+  - 9/5/2024- V/Q scan- low probability of PE  - 05/2024- Histoplasma neg, aspergillus neg, fungitell neg  - 3/2024- CT Chest wo contrast: waxing and waning nodular opacities b/l w/ enlarging ALEC 1.8cm nodule, RUL cavity stable and consistent with Chronic MAC.   - Treated for Mac for 8 weeks in summer 2023 but d/c'd given side effects mary anne with rifampin including diarrhea and orthostasis which are still ongoing.    - wean O2 as tolerated for a goal O2 saturation 82-92% given COPD  - continue to monitor respiratory status  - Pulmonology consult to establish dx: infectious vs autoimmune vs malignancy etiology  - fu sputum culture, induced sputum culture, strep PNA, Legionella, RSV, MRSA, Galactomannan, fungitell  - fu autoimune hairston to r/o GPA, sarcoid  - c/w zosyn 4.5g   - Pt unable to tolerate NADIR rx previously, fu oupt pulm for further mx and optimization  - fu pulmonology recs  - fu MRI Brain to r/o brain mets    #Pulmonary Nodules  CT chest with a preliminary read showing multiple cavitary and soft tissue mass lesions in bilateral lungs.   - f/u outpatient for workup  - PET neg for Cancer    #Nasal wall ulcer  2017 BX- SCC+    RENAL:  #Hyponatremia  Na+ 118 on presentation. Normal sodium in early November. Serum osmolality 260, urine osmolality 283, urine sodium 57. Hypovolemic on physical exam: dry MM and decreased skin turgor.  Etiology: Could be SIADH since Na improved from 118 to 121 w/ fluid restriction, Given hypovolemic physical exam, could be hypovolemic hyponatremia, CSW is a possibility given hypovolemic physical exam findings and p/w fall w/ headstrike but unlikely.    - Hypertonic saline w/ goal Na increase of 6-8meq over 24h  - continue to monitor w/ Q4H BMP and q12h urine studies  - fu nephrology recs  -TSH and AM cortisol WNL  - strict I and O    #Hypokalemia  #Hypomagnesemia  Likely in the setting of poor PO intake  Continue to monitor, replete as needed    #CKD   Patient with a hx of CKD, Hep C, Liver tx. Not currently getting dialysis. Baseline Cr 2.74 in early November.   UA- Protein>1000, RBC-26 represents a GN w/ nephritic range proteinuria    [facility] my chart accessed on daughters phone  - 11/21/2024 KFLC-272.2, LFLC- 28.4. K/L- 9.58, m-spike+, IgG-1089, iGa-211, iGm-72  - 11/21/2024  Uric acid-  6.7, LDH- 185  - 6/3/2024- Renal biopsy-                          - H&E- (1) segmental and global glomerulosclerosis, (2) diffuse global glomerulosclerosis (63%), w/ moderate to marked tubular atrophy & interstitial fibrosis w/ marked arterial and arteriolar sclerosis                         - LM - 16/24 glomeruli: globally sclerotic or show chronic hypoperfusion, 6/24: mild to moderate mesangial prominence, 2/24: segmental sclerosis, several bhavik show segmental "double contour" formation                         - IF - 4/6 globally sclerotic, Igm, Kappa, Lambda: trace granular mesagngial staining, IgG, IgA, C1q: no stain, 1+ C3- along tubular BM and in vessels, Casts- bitypic for both light chains. albumin neg, Fibrin neg                         - EM- 1/2 segmental sclerosis w/ subepithelial reparative changes as well as globular deposits consistent w/ hyaline w/ focal foot effacement.  GBM thickness mildly increaed, Subendothelial space:  no electron lucent expansion, No dense deposits. Mesangium: small electron dense deposits. Foci of mesangial interposition. No amyloid.   - 5/7/2024- iPTH- 112  - (3/2024) 24H Urine protein -1710, 75.2% paraprot excretion/24h  - 2/27/2024- 24H urine- 5-HIAA- 3.6 (WNL), Metanephrines- 35 (), normetanephrine- 122 (156-729), MPO neg, PR3 neg    - fu GN workup: Autoimmune (LAWANDA, ANCA, C3, C4, Cryoglobulin, anti-GBM ds, dsDNA), infection (HIV, Hep B, Hep C),   - continue to monitor  - avoid nephrotoxic agents       GI/:  #Liver transplant  S/p liver transplant in 3/2019 for cryptogenic cirrhosis. Home sirolimus 2mg QD.   - c/w sirolimus 2mg QD    #Unspecified Pancreatic Insufficiency   #Diarrhea  Elevated Alk Phos to 148 on admission. Likely elevated in the setting of pancreatic insufficiency.     [facility] my chart accessed on daughters phone  12/22/2023 MRI/MRCP abdomen w/ wo IV con: few pancreatic cystic lesions w/ indolent growth 1.6cm within pancreatic tail. no suspicious internal features, no main uct dilation. No solid mass  - C-Peptide- 10.4 (range 1.1-4.4)  - Fructosamine: 338 (0-285)  - low fecal elastatse 10/2023  - CMV+ 10/2023  - f/u GGT   - c/w home Creon   - c/w home loperamide     #NPO   Patient did not pass bedside dysphagia overnight 12/2-12/3. Patient NPO for now.   - Will reassess in AM    HEME:  #Chronic Anemia   Hemoglobin 11.8 on presentation. Could be 2/2 anemia of chronic disease or iron deficiency given CKD.   - continue to monitor    #MGUS  - elevated FLC w/ negative BM bx (7/2023)    ID:  #SIRS  #Leukocytosis  Met SIRS with tachypnea, tachycardia, and leukocytosis. Afebrile. Did have recent illness of URI. Low suspicion for bacterial infection at this time.   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 (Range, 2-96)                     PPX:  Fluids: fluid restriction of < 1L  Electrolytes: replete as needed  Nutrition: Diet, NPO:   Except Medications (12-03-24 @ 04:43) [Active]  PPI ppx: home famotidine 20mg BID  Dvt ppx: heparin 5000units subQ Q8H  Activity: fall precautions   Code Status: full code

## 2024-12-05 NOTE — DIETITIAN NUTRITION RISK NOTIFICATION - ADDITIONAL COMMENTS/DIETITIAN RECOMMENDATIONS
Pt meets ASPEN criteria for severe, chronic protein-calorie malnutrition in setting of significant muscle/fat wasting.

## 2024-12-06 DIAGNOSIS — A31.0 PULMONARY MYCOBACTERIAL INFECTION: ICD-10-CM

## 2024-12-06 DIAGNOSIS — N18.9 CHRONIC KIDNEY DISEASE, UNSPECIFIED: ICD-10-CM

## 2024-12-06 DIAGNOSIS — K86.89 OTHER SPECIFIED DISEASES OF PANCREAS: ICD-10-CM

## 2024-12-06 DIAGNOSIS — I16.0 HYPERTENSIVE URGENCY: ICD-10-CM

## 2024-12-06 DIAGNOSIS — I50.22 CHRONIC SYSTOLIC (CONGESTIVE) HEART FAILURE: ICD-10-CM

## 2024-12-06 DIAGNOSIS — Z94.4 LIVER TRANSPLANT STATUS: ICD-10-CM

## 2024-12-06 DIAGNOSIS — R79.89 OTHER SPECIFIED ABNORMAL FINDINGS OF BLOOD CHEMISTRY: ICD-10-CM

## 2024-12-06 DIAGNOSIS — J44.1 CHRONIC OBSTRUCTIVE PULMONARY DISEASE WITH (ACUTE) EXACERBATION: ICD-10-CM

## 2024-12-06 DIAGNOSIS — D47.2 MONOCLONAL GAMMOPATHY: ICD-10-CM

## 2024-12-06 DIAGNOSIS — D63.8 ANEMIA IN OTHER CHRONIC DISEASES CLASSIFIED ELSEWHERE: ICD-10-CM

## 2024-12-06 DIAGNOSIS — Z29.9 ENCOUNTER FOR PROPHYLACTIC MEASURES, UNSPECIFIED: ICD-10-CM

## 2024-12-06 DIAGNOSIS — E87.1 HYPO-OSMOLALITY AND HYPONATREMIA: ICD-10-CM

## 2024-12-06 LAB
ALBUMIN SERPL ELPH-MCNC: 2.4 G/DL — LOW (ref 3.3–5)
ALP SERPL-CCNC: 96 U/L — SIGNIFICANT CHANGE UP (ref 40–120)
ALT FLD-CCNC: 9 U/L — LOW (ref 10–45)
ANION GAP SERPL CALC-SCNC: 11 MMOL/L — SIGNIFICANT CHANGE UP (ref 5–17)
AST SERPL-CCNC: 14 U/L — SIGNIFICANT CHANGE UP (ref 10–40)
BASOPHILS # BLD AUTO: 0.02 K/UL — SIGNIFICANT CHANGE UP (ref 0–0.2)
BASOPHILS NFR BLD AUTO: 0.4 % — SIGNIFICANT CHANGE UP (ref 0–2)
BILIRUB SERPL-MCNC: 0.3 MG/DL — SIGNIFICANT CHANGE UP (ref 0.2–1.2)
BUN SERPL-MCNC: 21 MG/DL — SIGNIFICANT CHANGE UP (ref 7–23)
CALCIUM SERPL-MCNC: 9.2 MG/DL — SIGNIFICANT CHANGE UP (ref 8.4–10.5)
CHLORIDE SERPL-SCNC: 103 MMOL/L — SIGNIFICANT CHANGE UP (ref 96–108)
CO2 SERPL-SCNC: 19 MMOL/L — LOW (ref 22–31)
CREAT SERPL-MCNC: 2.23 MG/DL — HIGH (ref 0.5–1.3)
EGFR: 29 ML/MIN/1.73M2 — LOW
EOSINOPHIL # BLD AUTO: 0.11 K/UL — SIGNIFICANT CHANGE UP (ref 0–0.5)
EOSINOPHIL NFR BLD AUTO: 2.1 % — SIGNIFICANT CHANGE UP (ref 0–6)
GLUCOSE SERPL-MCNC: 86 MG/DL — SIGNIFICANT CHANGE UP (ref 70–99)
HCT VFR BLD CALC: 34.5 % — LOW (ref 39–50)
HGB BLD-MCNC: 10.6 G/DL — LOW (ref 13–17)
IMM GRANULOCYTES NFR BLD AUTO: 0.6 % — SIGNIFICANT CHANGE UP (ref 0–0.9)
LYMPHOCYTES # BLD AUTO: 1.29 K/UL — SIGNIFICANT CHANGE UP (ref 1–3.3)
LYMPHOCYTES # BLD AUTO: 24.5 % — SIGNIFICANT CHANGE UP (ref 13–44)
MAGNESIUM SERPL-MCNC: 2 MG/DL — SIGNIFICANT CHANGE UP (ref 1.6–2.6)
MCHC RBC-ENTMCNC: 30.3 PG — SIGNIFICANT CHANGE UP (ref 27–34)
MCHC RBC-ENTMCNC: 30.7 G/DL — LOW (ref 32–36)
MCV RBC AUTO: 98.6 FL — SIGNIFICANT CHANGE UP (ref 80–100)
MONOCYTES # BLD AUTO: 0.53 K/UL — SIGNIFICANT CHANGE UP (ref 0–0.9)
MONOCYTES NFR BLD AUTO: 10.1 % — SIGNIFICANT CHANGE UP (ref 2–14)
NEUTROPHILS # BLD AUTO: 3.28 K/UL — SIGNIFICANT CHANGE UP (ref 1.8–7.4)
NEUTROPHILS NFR BLD AUTO: 62.3 % — SIGNIFICANT CHANGE UP (ref 43–77)
NRBC # BLD: 0 /100 WBCS — SIGNIFICANT CHANGE UP (ref 0–0)
PHOSPHATE SERPL-MCNC: 3.2 MG/DL — SIGNIFICANT CHANGE UP (ref 2.5–4.5)
PLATELET # BLD AUTO: 151 K/UL — SIGNIFICANT CHANGE UP (ref 150–400)
POTASSIUM SERPL-MCNC: 3.7 MMOL/L — SIGNIFICANT CHANGE UP (ref 3.5–5.3)
POTASSIUM SERPL-SCNC: 3.7 MMOL/L — SIGNIFICANT CHANGE UP (ref 3.5–5.3)
PROT SERPL-MCNC: 5.6 G/DL — LOW (ref 6–8.3)
RBC # BLD: 3.5 M/UL — LOW (ref 4.2–5.8)
RBC # FLD: 15.1 % — HIGH (ref 10.3–14.5)
SODIUM SERPL-SCNC: 133 MMOL/L — LOW (ref 135–145)
WBC # BLD: 5.26 K/UL — SIGNIFICANT CHANGE UP (ref 3.8–10.5)
WBC # FLD AUTO: 5.26 K/UL — SIGNIFICANT CHANGE UP (ref 3.8–10.5)

## 2024-12-06 PROCEDURE — 99497 ADVNCD CARE PLAN 30 MIN: CPT | Mod: 25

## 2024-12-06 PROCEDURE — 99233 SBSQ HOSP IP/OBS HIGH 50: CPT | Mod: GC

## 2024-12-06 PROCEDURE — 99233 SBSQ HOSP IP/OBS HIGH 50: CPT

## 2024-12-06 PROCEDURE — 99223 1ST HOSP IP/OBS HIGH 75: CPT | Mod: GC

## 2024-12-06 RX ORDER — CARVEDILOL 25 MG/1
3.12 TABLET, FILM COATED ORAL EVERY 12 HOURS
Refills: 0 | Status: DISCONTINUED | OUTPATIENT
Start: 2024-12-06 | End: 2024-12-07

## 2024-12-06 RX ORDER — LOSARTAN POTASSIUM 100 MG/1
25 TABLET, FILM COATED ORAL ONCE
Refills: 0 | Status: DISCONTINUED | OUTPATIENT
Start: 2024-12-06 | End: 2024-12-06

## 2024-12-06 RX ORDER — LOSARTAN POTASSIUM 100 MG/1
25 TABLET, FILM COATED ORAL ONCE
Refills: 0 | Status: COMPLETED | OUTPATIENT
Start: 2024-12-06 | End: 2024-12-06

## 2024-12-06 RX ORDER — SIROLIMUS 1 MG/1
2 TABLET ORAL ONCE
Refills: 0 | Status: COMPLETED | OUTPATIENT
Start: 2024-12-07 | End: 2024-12-07

## 2024-12-06 RX ORDER — LOSARTAN POTASSIUM 100 MG/1
25 TABLET, FILM COATED ORAL DAILY
Refills: 0 | Status: DISCONTINUED | OUTPATIENT
Start: 2024-12-06 | End: 2024-12-06

## 2024-12-06 RX ORDER — LOSARTAN POTASSIUM 100 MG/1
50 TABLET, FILM COATED ORAL EVERY 24 HOURS
Refills: 0 | Status: DISCONTINUED | OUTPATIENT
Start: 2024-12-07 | End: 2024-12-08

## 2024-12-06 RX ADMIN — Medication 3 CAPSULE(S): at 18:36

## 2024-12-06 RX ADMIN — Medication 1 SPRAY(S): at 17:32

## 2024-12-06 RX ADMIN — Medication 5000 UNIT(S): at 21:33

## 2024-12-06 RX ADMIN — CARVEDILOL 3.12 MILLIGRAM(S): 25 TABLET, FILM COATED ORAL at 21:29

## 2024-12-06 RX ADMIN — SODIUM CHLORIDE 4 MILLILITER(S): 9 INJECTION, SOLUTION INTRAMUSCULAR; INTRAVENOUS; SUBCUTANEOUS at 09:33

## 2024-12-06 RX ADMIN — Medication 2 DROP(S): at 05:53

## 2024-12-06 RX ADMIN — Medication 80 MILLIGRAM(S): at 21:29

## 2024-12-06 RX ADMIN — Medication 2 MILLIGRAM(S): at 19:09

## 2024-12-06 RX ADMIN — TAMSULOSIN HYDROCHLORIDE 0.4 MILLIGRAM(S): 0.4 CAPSULE ORAL at 17:32

## 2024-12-06 RX ADMIN — Medication 5000 UNIT(S): at 13:17

## 2024-12-06 RX ADMIN — SIROLIMUS 2 MILLIGRAM(S): 1 TABLET ORAL at 06:04

## 2024-12-06 RX ADMIN — Medication 81 MILLIGRAM(S): at 11:50

## 2024-12-06 RX ADMIN — Medication 5000 UNIT(S): at 05:53

## 2024-12-06 RX ADMIN — PIPERACILLIN SODIUM AND TAZOBACTAM SODIUM 25 GRAM(S): 4; .5 INJECTION, POWDER, LYOPHILIZED, FOR SOLUTION INTRAVENOUS at 05:53

## 2024-12-06 RX ADMIN — ACETAMINOPHEN 500MG 650 MILLIGRAM(S): 500 TABLET, COATED ORAL at 06:46

## 2024-12-06 RX ADMIN — IPRATROPIUM BROMIDE AND ALBUTEROL SULFATE 3 MILLILITER(S): 2.5; .5 SOLUTION RESPIRATORY (INHALATION) at 17:18

## 2024-12-06 RX ADMIN — PIPERACILLIN SODIUM AND TAZOBACTAM SODIUM 25 GRAM(S): 4; .5 INJECTION, POWDER, LYOPHILIZED, FOR SOLUTION INTRAVENOUS at 21:30

## 2024-12-06 RX ADMIN — FLUTICASONE PROPIONATE AND SALMETEROL XINAFOATE 1 DOSE(S): 45; 21 AEROSOL, METERED RESPIRATORY (INHALATION) at 05:53

## 2024-12-06 RX ADMIN — IPRATROPIUM BROMIDE AND ALBUTEROL SULFATE 3 MILLILITER(S): 2.5; .5 SOLUTION RESPIRATORY (INHALATION) at 09:33

## 2024-12-06 RX ADMIN — Medication 2 MILLIGRAM(S): at 11:58

## 2024-12-06 RX ADMIN — FLUTICASONE PROPIONATE AND SALMETEROL XINAFOATE 1 DOSE(S): 45; 21 AEROSOL, METERED RESPIRATORY (INHALATION) at 17:18

## 2024-12-06 RX ADMIN — Medication 2 PUFF(S): at 11:50

## 2024-12-06 RX ADMIN — PIPERACILLIN SODIUM AND TAZOBACTAM SODIUM 25 GRAM(S): 4; .5 INJECTION, POWDER, LYOPHILIZED, FOR SOLUTION INTRAVENOUS at 13:17

## 2024-12-06 RX ADMIN — Medication 3 CAPSULE(S): at 11:58

## 2024-12-06 RX ADMIN — Medication 150 MILLIGRAM(S): at 11:50

## 2024-12-06 RX ADMIN — LOSARTAN POTASSIUM 25 MILLIGRAM(S): 100 TABLET, FILM COATED ORAL at 09:53

## 2024-12-06 RX ADMIN — LOSARTAN POTASSIUM 25 MILLIGRAM(S): 100 TABLET, FILM COATED ORAL at 17:17

## 2024-12-06 RX ADMIN — Medication 2 DROP(S): at 17:19

## 2024-12-06 RX ADMIN — Medication 5 MILLIGRAM(S): at 09:33

## 2024-12-06 RX ADMIN — FAMOTIDINE 20 MILLIGRAM(S): 20 TABLET, FILM COATED ORAL at 09:33

## 2024-12-06 RX ADMIN — FAMOTIDINE 20 MILLIGRAM(S): 20 TABLET, FILM COATED ORAL at 18:37

## 2024-12-06 RX ADMIN — Medication 2 MILLIGRAM(S): at 09:33

## 2024-12-06 RX ADMIN — Medication 3 CAPSULE(S): at 07:53

## 2024-12-06 NOTE — PHYSICAL THERAPY INITIAL EVALUATION ADULT - GENERAL OBSERVATIONS, REHAB EVAL
Pt received in bed in no acute distress, +katz, EKG, multiple skin lacerations along BUE/BLE, facial laceration L supraorbital area. pt sates he did not sleep well and is tired.

## 2024-12-06 NOTE — PROGRESS NOTE ADULT - PROBLEM SELECTOR PLAN 12
Fluids: fluid restriction of < 1L  Electrolytes: replete as needed  Nutrition: Diet, NPO:   Except Medications (12-03-24 @ 04:43) [Active]  PPI ppx: home famotidine 20mg BID  Dvt ppx: heparin 5000units subQ Q8H  Activity: fall precautions   Code Status: full code Fluids: fluid restriction of < 1L  Electrolytes: replete as needed  Nutrition: Diet, soft and bite sized   PPI ppx: home famotidine 20mg BID  Dvt ppx: heparin 5000units subQ Q8H  Activity: fall precautions   Code Status: full code

## 2024-12-06 NOTE — PROGRESS NOTE ADULT - PROBLEM SELECTOR PLAN 2
Na+ 118 on presentation. Normal sodium in early November. Serum osmolality 260, urine osmolality 283, urine sodium 57. Hypovolemic on physical exam: dry MM and decreased skin turgor.  Etiology: Hypovolemic hyponatremia most likely in setting of chronic diarrhea after NADIR rx  - Na 133 12/6  - Hypertonic saline w/ goal Na increase of 6-8meq over 24h  - continue to monitor w/ Q4H BMP and q12h urine studies  - fu nephrology recs  -TSH and AM cortisol WNL  - strict I and O

## 2024-12-06 NOTE — PROGRESS NOTE ADULT - PROBLEM SELECTOR PLAN 8
Elevated Alk Phos to 148 on admission. Likely elevated in the setting of pancreatic insufficiency. Pt has chronic diarrhea.     [facility] my chart accessed on daughters phone  12/22/2023 MRI/MRCP abdomen w/ wo IV con: few pancreatic cystic lesions w/ indolent growth 1.6cm within pancreatic tail. no suspicious internal features, no main uct dilation. No solid mass  - C-Peptide- 10.4 (range 1.1-4.4)  - Fructosamine: 338 (0-285)  - low fecal elastatse 10/2023  - CMV+ 10/2023  - f/u GGT   - c/w home Creon   - c/w home loperamide S/p liver transplant in 3/2019 for cryptogenic cirrhosis. Home sirolimus 2mg QD.   - c/w sirolimus 2mg QD  -f/u level tomorrow AM   -hepatology following

## 2024-12-06 NOTE — PROGRESS NOTE ADULT - PROBLEM SELECTOR PLAN 1
#Hypertensive Urgency with Encephalopathy (IMPROVING)  Patient presented to ED with blood pressure 200/127. S/p nicardipine gtt and lasix 60mg IV in ED. Blood pressure lowered by presentation to floor to 149/92. No longer encephalopathic. Elevated blood pressure could be 2/2 pain w/ head strike.   - continue to monitor blood pressure   s/p IV Labetol 10mg x1, ctm BP. #Hypertensive Urgency with Encephalopathy (IMPROVING)  Patient presented to ED with blood pressure 200/127. S/p nicardipine gtt and lasix 60mg IV in ED. Blood pressure lowered by presentation to floor to 149/92. No longer encephalopathic. Elevated blood pressure could be 2/2 pain w/ head strike.   - continue to monitor blood pressure   -increased losartan to 50mg QD  -avoid CCB due to HFrEF Hypertensive Urgency with Encephalopathy (IMPROVING)  Patient presented to ED with blood pressure 200/127. S/p nicardipine gtt and lasix 60mg IV in ED. Blood pressure lowered by presentation to floor to 149/92. No longer encephalopathic. Elevated blood pressure could be 2/2 pain w/ head strike. p/w fall w/ garbled speech w/ /140 in the field and 200/127 in ED, managed w/ cardine drip initially and currently on labetolol IV PRN. Plasma renin activity and aldosterone level WNL    [facility] my chart accessed on daughters phone  - aldosterone <1, plasma renin 0.511    - Reduce BP by 25% IN 24 hrs  - ctm vitals  - US Abdomen w/ doppler: NO SUKHJINDER,   - Plasma renin activity and aldosterone level WNL    Plan   - continue to monitor blood pressure   -increased losartan to 50mg QD  -avoid CCB due to HFrEF

## 2024-12-06 NOTE — DISCHARGE NOTE PROVIDER - NSDCMRMEDTOKEN_GEN_ALL_CORE_FT
acitretin 10 mg oral capsule: 1 cap(s) orally 2 times a day  Advair  mcg-21 mcg/inh inhalation aerosol: 2 inhaled 2 times a day  allopurinol 100 mg oral tablet: orally once a day  benzonatate 100 mg oral capsule: 1 cap(s) orally as needed for  cough  buPROPion 150 mg/24 hours (XL) oral tablet, extended release: 1 tab(s) orally once a day  famotidine 20 mg oral tablet: 1 tab(s) orally 2 times a day  finasteride 5 mg oral tablet: 1 tab(s) orally once a day  fluocinolone 0.01% topical cream: Apply topically to affected area  hydrocortisone 2.5% topical cream: Apply topically to affected area  loperamide 2 mg oral capsule: 1 cap(s) orally 4 times a day  midodrine 2.5 mg oral tablet: 1 tab(s) orally 2 times a day  mupirocin 2% topical cream: Apply topically to affected area  NebuSal 3% inhalation solution: inhaled 2 times a day  ondansetron 4 mg oral tablet: 1 tab(s) orally as needed for  nausea  Pancrelipase:   sirolimus 2 mg oral tablet: 1 tab(s) orally once a day  Spiriva 18 mcg inhalation capsule: 1 cap(s) inhaled 2 times a day  tamsulosin 0.4 mg oral capsule: 1 cap(s) orally  ursodiol 500 mg oral tablet: 1 tab(s) orally   acitretin 10 mg oral capsule: 1 cap(s) orally 2 times a day  Advair  mcg-21 mcg/inh inhalation aerosol: 2 inhaled 2 times a day  allopurinol 100 mg oral tablet: orally once a day  amoxicillin-clavulanate 500 mg-125 mg oral tablet: 1 tab(s) orally every 12 hours  aspirin 81 mg oral tablet, chewable: 1 tab(s) orally once a day  atorvastatin 80 mg oral tablet: 1 tab(s) orally once a day (at bedtime)  benzonatate 100 mg oral capsule: 1 cap(s) orally as needed for  cough  buPROPion 150 mg/24 hours (XL) oral tablet, extended release: 1 tab(s) orally once a day  carvedilol 3.125 mg oral tablet: 1 tab(s) orally every 12 hours  famotidine 20 mg oral tablet: 1 tab(s) orally 2 times a day  finasteride 5 mg oral tablet: 1 tab(s) orally once a day  fluocinolone 0.01% topical cream: Apply topically to affected area  hydrALAZINE 25 mg oral tablet: 1 tab(s) orally every 8 hours  hydrocortisone 2.5% topical cream: Apply topically to affected area  loperamide 2 mg oral capsule: 1 cap(s) orally 4 times a day  losartan 100 mg oral tablet: 1 tab(s) orally every 24 hours  midodrine 2.5 mg oral tablet: 1 tab(s) orally 2 times a day  mupirocin 2% topical cream: Apply topically to affected area  naphazoline-pheniramine 0.025%-0.3% ophthalmic solution: 2 drop(s) to each affected eye 2 times a day  NebuSal 3% inhalation solution: inhaled 2 times a day  ondansetron 4 mg oral tablet: 1 tab(s) orally as needed for  nausea  Pancrelipase:   sirolimus 2 mg oral tablet: 1 tab(s) orally once a day To be given first thing in the morning on empty stomach. No other PO medications until 30-60 minutes after administration  sodium bicarbonate 650 mg oral tablet: 1 tab(s) orally every 8 hours  sodium chloride 0.65% nasal spray: 1 nasal every 6 hours as needed for  pain  Spiriva 18 mcg inhalation capsule: 1 cap(s) inhaled 2 times a day  tamsulosin 0.4 mg oral capsule: 1 cap(s) orally  ursodiol 500 mg oral tablet: 1 tab(s) orally   acitretin 10 mg oral capsule: 1 cap(s) orally 2 times a day  Advair  mcg-21 mcg/inh inhalation aerosol: 2 inhaled 2 times a day  allopurinol 100 mg oral tablet: orally once a day  amoxicillin-clavulanate 500 mg-125 mg oral tablet: 1 tab(s) orally every 12 hours EOT 12/17  aspirin 81 mg oral tablet, chewable: 1 tab(s) orally once a day  atorvastatin 80 mg oral tablet: 1 tab(s) orally once a day (at bedtime)  benzonatate 100 mg oral capsule: 1 cap(s) orally as needed for  cough  buPROPion 150 mg/24 hours (XL) oral tablet, extended release: 1 tab(s) orally once a day  carvedilol 3.125 mg oral tablet: 1 tab(s) orally every 12 hours  famotidine 20 mg oral tablet: 1 tab(s) orally 2 times a day  finasteride 5 mg oral tablet: 1 tab(s) orally once a day  fluocinolone 0.01% topical cream: Apply topically to affected area  hydrALAZINE 25 mg oral tablet: 1 tab(s) orally every 8 hours  hydrocortisone 2.5% topical cream: Apply topically to affected area  loperamide 2 mg oral capsule: 1 cap(s) orally 4 times a day  losartan 100 mg oral tablet: 1 tab(s) orally every 24 hours  mupirocin 2% topical cream: Apply topically to affected area  naphazoline-pheniramine 0.025%-0.3% ophthalmic solution: 2 drop(s) to each affected eye 2 times a day  NebuSal 3% inhalation solution: inhaled 2 times a day  ondansetron 4 mg oral tablet: 1 tab(s) orally as needed for  nausea  Pancrelipase:   sirolimus 2 mg oral tablet: 1 tab(s) orally once a day To be given first thing in the morning on empty stomach. No other PO medications until 30-60 minutes after administration  sodium bicarbonate 650 mg oral tablet: 1 tab(s) orally every 8 hours  sodium chloride 0.65% nasal spray: 1 nasal every 6 hours as needed for  pain  Spiriva 18 mcg inhalation capsule: 1 cap(s) inhaled 2 times a day  tamsulosin 0.4 mg oral capsule: 1 cap(s) orally  ursodiol 500 mg oral tablet: 1 tab(s) orally   acitretin 10 mg oral capsule: 1 cap(s) orally 2 times a day  Advair  mcg-21 mcg/inh inhalation aerosol: 2 inhaled 2 times a day  allopurinol 100 mg oral tablet: orally once a day  amoxicillin-clavulanate 500 mg-125 mg oral tablet: 1 tab(s) orally every 12 hours EOT 12/17  aspirin 81 mg oral tablet, chewable: 1 tab(s) orally once a day  atorvastatin 80 mg oral tablet: 1 tab(s) orally once a day (at bedtime)  benzonatate 100 mg oral capsule: 1 cap(s) orally as needed for  cough  buPROPion 150 mg/24 hours (XL) oral tablet, extended release: 1 tab(s) orally once a day  carvedilol 3.125 mg oral tablet: 1 tab(s) orally every 12 hours  famotidine 20 mg oral tablet: 1 tab(s) orally 2 times a day  finasteride 5 mg oral tablet: 1 tab(s) orally once a day  fluocinolone 0.01% topical cream: Apply topically to affected area  hydrALAZINE 25 mg oral tablet: 1 tab(s) orally every 8 hours  hydrocortisone 2.5% topical cream: Apply topically to affected area  loperamide 2 mg oral capsule: 1 cap(s) orally 4 times a day  losartan 100 mg oral tablet: 1 tab(s) orally every 24 hours  mupirocin 2% topical cream: Apply topically to affected area  naphazoline-pheniramine 0.025%-0.3% ophthalmic solution: 2 drop(s) to each affected eye 2 times a day  NebuSal 3% inhalation solution: inhaled 2 times a day  ondansetron 4 mg oral tablet: 1 tab(s) orally as needed for  nausea  sirolimus 2 mg oral tablet: 1 tab(s) orally once a day To be given first thing in the morning on empty stomach. No other PO medications until 30-60 minutes after administration  sodium bicarbonate 650 mg oral tablet: 1 tab(s) orally every 8 hours  sodium chloride 0.65% nasal spray: 1 nasal every 6 hours as needed for  pain  Spiriva 18 mcg inhalation capsule: 1 cap(s) inhaled 2 times a day  tamsulosin 0.4 mg oral capsule: 1 cap(s) orally  ursodiol 500 mg oral tablet: 1 tab(s) orally   acitretin 10 mg oral capsule: 1 cap(s) orally 2 times a day  Advair  mcg-21 mcg/inh inhalation aerosol: 2 inhaled 2 times a day  allopurinol 100 mg oral tablet: orally once a day  amoxicillin-clavulanate 500 mg-125 mg oral tablet: 1 tab(s) orally every 12 hours EOT 12/17  aspirin 81 mg oral tablet, chewable: 1 tab(s) orally once a day  atorvastatin 80 mg oral tablet: 1 tab(s) orally once a day (at bedtime)  benzonatate 100 mg oral capsule: 1 cap(s) orally as needed for  cough  buPROPion 150 mg/24 hours (XL) oral tablet, extended release: 1 tab(s) orally once a day  carvedilol 3.125 mg oral tablet: 1 tab(s) orally every 12 hours  famotidine 20 mg oral tablet: 1 tab(s) orally 2 times a day  finasteride 5 mg oral tablet: 1 tab(s) orally once a day  fluocinolone 0.01% topical cream: Apply topically to affected area  hydrALAZINE 25 mg oral tablet: 1 tab(s) orally every 8 hours  hydrocortisone 2.5% topical cream: Apply topically to affected area  loperamide 2 mg oral capsule: 1 cap(s) orally 4 times a day  losartan 100 mg oral tablet: 1 tab(s) orally every 24 hours  mupirocin 2% topical cream: Apply topically to affected area  naphazoline-pheniramine 0.025%-0.3% ophthalmic solution: 2 drop(s) to each affected eye 2 times a day  NebuSal 3% inhalation solution: inhaled 2 times a day  ondansetron 4 mg oral tablet: 1 tab(s) orally every 8 hours as needed for  nausea  sirolimus 2 mg oral tablet: 1 tab(s) orally once a day To be given first thing in the morning on empty stomach. No other PO medications until 30-60 minutes after administration  sodium bicarbonate 650 mg oral tablet: 1 tab(s) orally every 8 hours  sodium chloride 0.65% nasal spray: 1 nasal every 6 hours as needed for  pain  Spiriva 18 mcg inhalation capsule: 1 cap(s) inhaled 2 times a day  tamsulosin 0.4 mg oral capsule: 1 cap(s) orally once a day (at bedtime)  ursodiol 500 mg oral tablet: 1 tab(s) orally once a day (at bedtime)   acitretin 10 mg oral capsule: 1 cap(s) orally 2 times a day  Advair  mcg-21 mcg/inh inhalation aerosol: 2 inhaled 2 times a day  allopurinol 100 mg oral tablet: orally once a day  amoxicillin-clavulanate 500 mg-125 mg oral tablet: 1 tab(s) orally every 12 hours EOT 12/17  aspirin 81 mg oral tablet, chewable: 1 tab(s) orally once a day  atorvastatin 80 mg oral tablet: 1 tab(s) orally once a day (at bedtime)  benzonatate 100 mg oral capsule: 1 cap(s) orally as needed for  cough  buPROPion 150 mg/24 hours (XL) oral tablet, extended release: 1 tab(s) orally once a day  carvedilol 3.125 mg oral tablet: 1 tab(s) orally every 12 hours  famotidine 20 mg oral tablet: 1 tab(s) orally 2 times a day  finasteride 5 mg oral tablet: 1 tab(s) orally once a day  fluocinolone 0.01% topical cream: Apply topically to affected area  hydrALAZINE 25 mg oral tablet: 1 tab(s) orally every 8 hours  hydrocortisone 2.5% topical cream: Apply topically to affected area  isosorbide dinitrate 10 mg oral tablet: 1 tab(s) orally every 8 hours  loperamide 2 mg oral capsule: 1 cap(s) orally 4 times a day  losartan 100 mg oral tablet: 1 tab(s) orally every 24 hours  mupirocin 2% topical cream: Apply topically to affected area  naphazoline-pheniramine 0.025%-0.3% ophthalmic solution: 2 drop(s) to each affected eye 2 times a day  NebuSal 3% inhalation solution: inhaled 2 times a day  ondansetron 4 mg oral tablet: 1 tab(s) orally every 8 hours as needed for  nausea  sirolimus 2 mg oral tablet: 1 tab(s) orally once a day To be given first thing in the morning on empty stomach. No other PO medications until 30-60 minutes after administration  sodium bicarbonate 650 mg oral tablet: 1 tab(s) orally every 8 hours  sodium chloride 0.65% nasal spray: 1 nasal every 6 hours as needed for  pain  Spiriva 18 mcg inhalation capsule: 1 cap(s) inhaled 2 times a day  tamsulosin 0.4 mg oral capsule: 1 cap(s) orally once a day (at bedtime)  ursodiol 500 mg oral tablet: 1 tab(s) orally once a day (at bedtime)

## 2024-12-06 NOTE — PHYSICAL THERAPY INITIAL EVALUATION ADULT - NSPTDISCHREC_GEN_A_CORE
inpatient rehab pending hospital course and further progress with mobility. Spouse prefers home but agreeable to rehab.

## 2024-12-06 NOTE — PROGRESS NOTE ADULT - PROBLEM SELECTOR PLAN 3
[facility] my chart accessed on daughters phone  - Myocardial SPECT-  LVEF 44%, EKG- SR, rare PVC (11/2024)  - Tc-99 PYP amyloid scan- negative for TTR amyloid (10/29/2024)  - US Heart- small pericardial effusion (9/5/2024)  - [id number] (9/22/2024)    Patient with elevated BNP to 22,740. Patient wife reports hx of elevated BNP to 35k. Bedside POCUS showing an EF of ˜45%, no wall motion abnormalities, w/ dilated IVC on echo. Patient is s/p lasix 60mg IV in ED. Follows at [facility] cardiology.   - EF- 35%  - Strict Is&Os   - f/u formal TTE  - hold on additional diuresis for now

## 2024-12-06 NOTE — OCCUPATIONAL THERAPY INITIAL EVALUATION ADULT - ADDITIONAL COMMENTS
Pt lives w/ his wife in apt w/ elevator access. Pt states that he was independent prior to admission w/o prior use of AEs/DMEs.

## 2024-12-06 NOTE — PROGRESS NOTE ADULT - PROBLEM SELECTOR PLAN 5
- Treated for Mac for 8 weeks in summer 2023 but d/c'd given side effects mary anne with rifampin including diarrhea and orthostasis which are still ongoing. Presentation: iWOB, recent sick contact: wife (ROBI), BiPAP ---->NC. Home meds: Advair, Spiriva, hypertonic saline. Does not use home O2.   CT showing b/l cavitary+solid lung lesion concerning for primary lung malignancy, RUL s/p wedge resection, cannot exclude TB or fungal etiology w/ small left pleural effusion and Diffuse interstitial pulmonary edema.    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

## 2024-12-06 NOTE — PROGRESS NOTE ADULT - ASSESSMENT
AWNDER VAZQUEZ is a 79y year old Male with a PMHx significant for hepatitis C s/p liver transplant in 3/2019 for cryptogenic cirrhosis (on sirolimus for 5 years), unspecified autonomic nervous system disorder w/ hx of orthostasis, COPD, Mycobacterium avium complex lung disease, chronic anemia, amaurosis fugax, Crohn's disease, BPH, and unspecified pancreatic insufficiency. Patient presented to the ED on 12/2 after having fallen at home with a head-strike, without loss of consciousness. Found to be hyponatremic to 118 and found to have hypertensive urgency on presentation. Admitted for further medical management of blood pressure and hyponatremia.     NEURO:  #Metabolic Encephalopathy (RESOLVED)  Patient presented to the ED with AMS in the setting of hyponatremia and hypertensive urgency (-240). AMS improved to baseline A&Ox3 w/blood pressure control. AMS likely 2/2 cerebral edema in the setting of hypertensive urgency.     #Concern for Stroke  Stroke called in ED for AMS and apparent facial droop. Pt states he felt dizzy prior to him falling in the shower.   NIHSS 4, notable for LUE/LLE dysmetria.   CT brain, CTA large vessel study showing no acute hemorrhages or occlusions. Low concern for stroke given improved AMS and metabolic derangements   - Stroke consulted, appreciate recs       - repeat imaging CTH or MRI if symptoms do not improve    CARDIOVASCULAR:  #Hypertensive Urgency with Encephalopathy (IMPROVING)  Patient presented to ED with blood pressure 200/127. S/p nicardipine gtt and lasix 60mg IV in ED. Blood pressure lowered by presentation to floor to 149/92. No longer encephalopathic. Elevated blood pressure could be 2/2 pain w/ head strike.   - continue to monitor blood pressure   /p IV Labetol 10mg x1, ctm BP.    #Troponinemia   Patient presented with an elevated troponin to 115. Tachycardic on presentation. EKG with no ischemic changes, Bedside POCUS showing no wall motion abnormalities. Elevated troponin in the setting of demand ischemia and CKD.    - trend troponin to peak    #HFmREF  [facility] my chart accessed on daughters phone  - Myocardial SPECT-  LVEF 44%, EKG- SR, rare PVC (11/2024)  - Tc-99 PYP amyloid scan- negative for TTR amyloid (10/29/2024)  - US Heart- small pericardial effusion (9/5/2024)  - pRO-bnp-2080 (9/22/2024)    Patient with elevated BNP to 22,740. Patient wife reports hx of elevated BNP to 35k. Bedside POCUS showing an EF of ˜45%, no wall motion abnormalities, w/ dilated IVC on echo. Patient is s/p lasix 60mg IV in ED. Follows at [facility] cardiology.   - EF- 35%  - Strict Is&Os   - f/u formal TTE  - hold on additional diuresis for now    #Hypertensive emergency  p/w fall w/ garbled speech w/ /140 in the field and 200/127 in ED, managed w/ cardine drip initially and currently on labetolol IV PRN.    [facility] my chart accessed on [employer] phone  - aldosterone <1, plasma renin 0.511    - Reduce BP by 25% IN 24 hrs  - ctm vitals  - US Abdomen w/ doppler: NO SUKHJINDER,   - Plasma renin activity and aldosterone level WNL    PULMONARY:  #COPD  #MAC  # R/O lung malignancy  #R/O active TB  Presentation: iWOB, recent sick contact: wife (ROBI), BiPAP ---->NC. Home meds: Advair, Spiriva, hypertonic saline. Does not use home O2.   CT showing b/l cavitary+solid lung lesion concerning for primary lung malignancy, RUL s/p wedge resection, cannot exclude TB or fungal etiology w/ small left pleural effusion and Diffuse interstitial pulmonary edema.    [facility] my chart accessed on [employer] phone  -11/21/2024- Sputum AFB smear+, c/s Mycobacterium abscessus+ (also positive in june and july 2024)  - 07/02/2024- candida albicans+  - 9/5/2024- V/Q scan- low probability of PE  - 05/2024- Histoplasma neg, aspergillus neg, fungitell neg  - 3/2024- CT Chest wo contrast: waxing and waning nodular opacities b/l w/ enlarging ALEC 1.8cm nodule, RUL cavity stable and consistent with Chronic MAC.   - Treated for Mac for 8 weeks in summer 2023 but d/c'd given side effects mary anne with rifampin including diarrhea and orthostasis which are still ongoing.    - wean O2 as tolerated for a goal O2 saturation 82-92% given COPD  - continue to monitor respiratory status  - Pulmonology consult to establish dx: infectious vs autoimmune vs malignancy etiology  - fu sputum culture, induced sputum culture, strep PNA, Legionella, RSV, MRSA, Galactomannan, fungitell  - fu autoimune hairston to r/o GPA, sarcoid  - c/w zosyn 4.5g   - Pt unable to tolerate NADIR rx previously, fu oupt pulm for further mx and optimization  - fu pulmonology recs  - fu MRI Brain to r/o brain mets    #Pulmonary Nodules  CT chest with a preliminary read showing multiple cavitary and soft tissue mass lesions in bilateral lungs.   - f/u outpatient for workup  - PET neg for Cancer    #Nasal wall ulcer  2017 BX- SCC+    RENAL:  #Hyponatremia  Na+ 118 on presentation. Normal sodium in early November. Serum osmolality 260, urine osmolality 283, urine sodium 57. Hypovolemic on physical exam: dry MM and decreased skin turgor.  Etiology: Could be SIADH since Na improved from 118 to 121 w/ fluid restriction, Given hypovolemic physical exam, could be hypovolemic hyponatremia, CSW is a possibility given hypovolemic physical exam findings and p/w fall w/ headstrike but unlikely.    - Na 133 12/6  - Hypertonic saline w/ goal Na increase of 6-8meq over 24h  - continue to monitor w/ Q4H BMP and q12h urine studies  - fu nephrology recs  -TSH and AM cortisol WNL  - strict I and O    #Hypokalemia  #Hypomagnesemia  Likely in the setting of poor PO intake  Continue to monitor, replete as needed    #CKD   Patient with a hx of CKD, Hep C, Liver tx. Not currently getting dialysis. Baseline Cr 2.74 in early November.   UA- Protein>1000, RBC-26 represents a GN w/ nephritic range proteinuria    [facility] my chart accessed on daughters phone  - 11/21/2024 KFLC-272.2, LFLC- 28.4. K/L- 9.58, m-spike+, IgG-1089, iGa-211, iGm-72  - 11/21/2024  Uric acid-  6.7, LDH- 185  - 6/3/2024- Renal biopsy-                          - H&E- (1) segmental and global glomerulosclerosis, (2) diffuse global glomerulosclerosis (63%), w/ moderate to marked tubular atrophy & interstitial fibrosis w/ marked arterial and arteriolar sclerosis                         - LM - 16/24 glomeruli: globally sclerotic or show chronic hypoperfusion, 6/24: mild to moderate mesangial prominence, 2/24: segmental sclerosis, several bhavik show segmental "double contour" formation                         - IF - 4/6 globally sclerotic, Igm, Kappa, Lambda: trace granular mesagngial staining, IgG, IgA, C1q: no stain, 1+ C3- along tubular BM and in vessels, Casts- bitypic for both light chains. albumin neg, Fibrin neg                         - EM- 1/2 segmental sclerosis w/ subepithelial reparative changes as well as globular deposits consistent w/ hyaline w/ focal foot effacement.  GBM thickness mildly increaed, Subendothelial space:  no electron lucent expansion, No dense deposits. Mesangium: small electron dense deposits. Foci of mesangial interposition. No amyloid.   - 5/7/2024- iPTH- 112  - (3/2024) 24H Urine protein -1710, 75.2% paraprot excretion/24h  - 2/27/2024- 24H urine- 5-HIAA- 3.6 (WNL), Metanephrines- 35 (), normetanephrine- 122 (156-729), MPO neg, PR3 neg    - fu GN workup: Autoimmune (LAWANDA, ANCA, C3, C4, Cryoglobulin, anti-GBM ds, dsDNA), infection (HIV, Hep B, Hep C),   - continue to monitor  - avoid nephrotoxic agents       GI/:  #Liver transplant  S/p liver transplant in 3/2019 for cryptogenic cirrhosis. Home sirolimus 2mg QD.   - c/w sirolimus 2mg QD    #Unspecified Pancreatic Insufficiency   #Diarrhea  Elevated Alk Phos to 148 on admission. Likely elevated in the setting of pancreatic insufficiency.     [facility] my chart accessed on daughters phone  12/22/2023 MRI/MRCP abdomen w/ wo IV con: few pancreatic cystic lesions w/ indolent growth 1.6cm within pancreatic tail. no suspicious internal features, no main uct dilation. No solid mass  - C-Peptide- 10.4 (range 1.1-4.4)  - Fructosamine: 338 (0-285)  - low fecal elastatse 10/2023  - CMV+ 10/2023  - f/u GGT   - c/w home Creon   - c/w home loperamide     #NPO   Patient did not pass bedside dysphagia overnight 12/2-12/3. Patient NPO for now.   - Will reassess in AM    HEME:  #Chronic Anemia   Hemoglobin 11.8 on presentation. Could be 2/2 anemia of chronic disease or iron deficiency given CKD.   - continue to monitor    #MGUS  - elevated FLC w/ negative BM bx (7/2023)    ID:  #SIRS  #Leukocytosis  Met SIRS with tachypnea, tachycardia, and leukocytosis. Afebrile. Did have recent illness of URI. Low suspicion for bacterial infection at this time.   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 (Range, 2-96)                     PPX:  Fluids: fluid restriction of < 1L  Electrolytes: replete as needed  Nutrition: Diet, NPO:   Except Medications (12-03-24 @ 04:43) [Active]  PPI ppx: home famotidine 20mg BID  Dvt ppx: heparin 5000units subQ Q8H  Activity: fall precautions   Code Status: full code   WANDER VAZQUEZ is a 79y year old Male with a PMHx significant for hepatitis C s/p liver transplant in 3/2019 for cryptogenic cirrhosis (on sirolimus for 5 years), unspecified autonomic nervous system disorder w/ hx of orthostasis, COPD, Mycobacterium avium complex lung disease, chronic anemia, amaurosis fugax, Crohn's disease, BPH, and unspecified pancreatic insufficiency. Patient presented to the ED on 12/2 after having fallen at home with a head-strike, without loss of consciousness. Found to be hyponatremic to 118 and found to have hypertensive urgency on presentation. Admitted for further medical management of blood pressure and hyponatremia.     NEURO:  #Metabolic Encephalopathy (RESOLVED)  Patient presented to the ED with AMS in the setting of hyponatremia and hypertensive urgency (-240). AMS improved to baseline A&Ox3 w/blood pressure control. AMS likely 2/2 cerebral edema in the setting of hypertensive urgency.     #Concern for Stroke  Stroke called in ED for AMS and apparent facial droop. Pt states he felt dizzy prior to him falling in the shower.   NIHSS 4, notable for LUE/LLE dysmetria.   CT brain, CTA large vessel study showing no acute hemorrhages or occlusions. Low concern for stroke given improved AMS and metabolic derangements   - Stroke consulted, appreciate recs       - repeat imaging CTH or MRI if symptoms do not improve    CARDIOVASCULAR:  #Hypertensive Urgency with Encephalopathy (IMPROVING)  Patient presented to ED with blood pressure 200/127. S/p nicardipine gtt and lasix 60mg IV in ED. Blood pressure lowered by presentation to floor to 149/92. No longer encephalopathic. Elevated blood pressure could be 2/2 pain w/ head strike.   - continue to monitor blood pressure   /p IV Labetol 10mg x1, ctm BP.    #Troponinemia   Patient presented with an elevated troponin to 115. Tachycardic on presentation. EKG with no ischemic changes, Bedside POCUS showing no wall motion abnormalities. Elevated troponin in the setting of demand ischemia and CKD.    - trend troponin to peak    #HFmREF  [facility] my chart accessed on daughters phone  - Myocardial SPECT-  LVEF 44%, EKG- SR, rare PVC (11/2024)  - Tc-99 PYP amyloid scan- negative for TTR amyloid (10/29/2024)  - US Heart- small pericardial effusion (9/5/2024)  - pRO-bnp-2080 (9/22/2024)    Patient with elevated BNP to 22,740. Patient wife reports hx of elevated BNP to 35k. Bedside POCUS showing an EF of ˜45%, no wall motion abnormalities, w/ dilated IVC on echo. Patient is s/p lasix 60mg IV in ED. Follows at [facility] cardiology.   - EF- 35%  - Strict Is&Os   - f/u formal TTE  - hold on additional diuresis for now    #Hypertensive emergency  p/w fall w/ garbled speech w/ /140 in the field and 200/127 in ED, managed w/ cardine drip initially and currently on labetolol IV PRN.    [facility] my chart accessed on [email or web address] phone  - aldosterone <1, plasma renin 0.511    - Reduce BP by 25% IN 24 hrs  - ctm vitals  - US Abdomen w/ doppler: NO SUKHJINDER,   - Plasma renin activity and aldosterone level WNL    PULMONARY:  #COPD  #MAC  # R/O lung malignancy  #R/O active TB  Presentation: iWOB, recent sick contact: wife (ROBI), BiPAP ---->NC. Home meds: Advair, Spiriva, hypertonic saline. Does not use home O2.   CT showing b/l cavitary+solid lung lesion concerning for primary lung malignancy, RUL s/p wedge resection, cannot exclude TB or fungal etiology w/ small left pleural effusion and Diffuse interstitial pulmonary edema.    [facility] my chart accessed on [email or web address] phone  -11/21/2024- Sputum AFB smear+, c/s Mycobacterium abscessus+ (also positive in june and july 2024)  - 07/02/2024- candida albicans+  - 9/5/2024- V/Q scan- low probability of PE  - 05/2024- Histoplasma neg, aspergillus neg, fungitell neg  - 3/2024- CT Chest wo contrast: waxing and waning nodular opacities b/l w/ enlarging ALEC 1.8cm nodule, RUL cavity stable and consistent with Chronic MAC.   - Treated for Mac for 8 weeks in summer 2023 but d/c'd given side effects mary anne with rifampin including diarrhea and orthostasis which are still ongoing.    - wean O2 as tolerated for a goal O2 saturation 82-92% given COPD  - continue to monitor respiratory status  - Pulmonology consult to establish dx: infectious vs autoimmune vs malignancy etiology  - fu sputum culture, induced sputum culture, strep PNA, Legionella, RSV, MRSA, Galactomannan, fungitell  - fu autoimune hairston to r/o GPA, sarcoid  - c/w zosyn 4.5g   - Pt unable to tolerate NADIR rx previously, fu oupt pulm for further mx and optimization  - fu pulmonology recs  - fu MRI Brain to r/o brain mets    #Pulmonary Nodules  CT chest with a preliminary read showing multiple cavitary and soft tissue mass lesions in bilateral lungs.   - f/u outpatient for workup  - PET neg for Cancer    #Nasal wall ulcer  2017 BX- SCC+    RENAL:  #Hyponatremia  Na+ 118 on presentation. Normal sodium in early November. Serum osmolality 260, urine osmolality 283, urine sodium 57. Hypovolemic on physical exam: dry MM and decreased skin turgor.  Etiology: Hypovolemic hyponatremia most likely in setting of chronic diarrhea after NADIR rx  - Na 133 12/6  - Hypertonic saline w/ goal Na increase of 6-8meq over 24h  - continue to monitor w/ Q4H BMP and q12h urine studies  - fu nephrology recs  -TSH and AM cortisol WNL  - strict I and O    #Hypokalemia  #Hypomagnesemia  Likely in the setting of poor PO intake  Continue to monitor, replete as needed    #CKD   Patient with a hx of CKD, Hep C, Liver tx. Not currently getting dialysis. Baseline Cr 2.74 in early November.   UA- Protein>1000, RBC-26 represents a GN w/ nephritic range proteinuria    [facility] my chart accessed on daughters phone  - 11/21/2024 KFLC-272.2, LFLC- 28.4. K/L- 9.58, m-spike+, IgG-1089, iGa-211, iGm-72  - 11/21/2024  Uric acid-  6.7, LDH- 185  - 6/3/2024- Renal biopsy-                          - H&E- (1) segmental and global glomerulosclerosis, (2) diffuse global glomerulosclerosis (63%), w/ moderate to marked tubular atrophy & interstitial fibrosis w/ marked arterial and arteriolar sclerosis                         - LM - 16/24 glomeruli: globally sclerotic or show chronic hypoperfusion, 6/24: mild to moderate mesangial prominence, 2/24: segmental sclerosis, several bhavik show segmental "double contour" formation                         - IF - 4/6 globally sclerotic, Igm, Kappa, Lambda: trace granular mesagngial staining, IgG, IgA, C1q: no stain, 1+ C3- along tubular BM and in vessels, Casts- bitypic for both light chains. albumin neg, Fibrin neg                         - EM- 1/2 segmental sclerosis w/ subepithelial reparative changes as well as globular deposits consistent w/ hyaline w/ focal foot effacement.  GBM thickness mildly increaed, Subendothelial space:  no electron lucent expansion, No dense deposits. Mesangium: small electron dense deposits. Foci of mesangial interposition. No amyloid.   - 5/7/2024- iPTH- 112  - (3/2024) 24H Urine protein -1710, 75.2% paraprot excretion/24h  - 2/27/2024- 24H urine- 5-HIAA- 3.6 (WNL), Metanephrines- 35 (), normetanephrine- 122 (156-729), MPO neg, PR3 neg    - fu GN workup: Autoimmune (LAWANDA, ANCA, C3, C4, Cryoglobulin, anti-GBM ds, dsDNA), infection (HIV, Hep B, Hep C),   - continue to monitor  - avoid nephrotoxic agents       GI/:  #Liver transplant  S/p liver transplant in 3/2019 for cryptogenic cirrhosis. Home sirolimus 2mg QD.   - c/w sirolimus 2mg QD    #Unspecified Pancreatic Insufficiency   #Diarrhea  Elevated Alk Phos to 148 on admission. Likely elevated in the setting of pancreatic insufficiency.     [facility] my chart accessed on daughters phone  12/22/2023 MRI/MRCP abdomen w/ wo IV con: few pancreatic cystic lesions w/ indolent growth 1.6cm within pancreatic tail. no suspicious internal features, no main uct dilation. No solid mass  - C-Peptide- 10.4 (range 1.1-4.4)  - Fructosamine: 338 (0-285)  - low fecal elastatse 10/2023  - CMV+ 10/2023  - f/u GGT   - c/w home Creon   - c/w home loperamide     #NPO   Patient did not pass bedside dysphagia overnight 12/2-12/3. Patient NPO for now.   - Will reassess in AM    HEME:  #Chronic Anemia   Hemoglobin 11.8 on presentation. Could be 2/2 anemia of chronic disease or iron deficiency given CKD.   - continue to monitor    #MGUS  - elevated FLC w/ negative BM bx (7/2023)    ID:  #SIRS  #Leukocytosis  Met SIRS with tachypnea, tachycardia, and leukocytosis. Afebrile. Did have recent illness of URI. Low suspicion for bacterial infection at this time.   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 (Range, 2-96)                     PPX:  Fluids: fluid restriction of < 1L  Electrolytes: replete as needed  Nutrition: Diet, NPO:   Except Medications (12-03-24 @ 04:43) [Active]  PPI ppx: home famotidine 20mg BID  Dvt ppx: heparin 5000units subQ Q8H  Activity: fall precautions   Code Status: full code

## 2024-12-06 NOTE — PROGRESS NOTE ADULT - SUBJECTIVE AND OBJECTIVE BOX
INTERVAL HPI/OVERNIGHT EVENTS:  Patient was seen and examined at bedside. As per nurse and patient, no o/n events, patient resting comfortably. No complaints at this time. Patient denies: fever, chills, dizziness, weakness, HA, Changes in vision, CP, palpitations, SOB, cough, N/V/D/C, dysuria, changes in bowel movements, LE edema. ROS otherwise negative.    VITAL SIGNS:  T(F): 97.3 (12-06-24 @ 10:25)  HR: 98 (12-06-24 @ 11:50)  BP: 169/115 (12-06-24 @ 11:50)  RR: 18 (12-06-24 @ 11:50)  SpO2: 93% (12-06-24 @ 11:50)  Wt(kg): --      12-05-24 @ 07:01  -  12-06-24 @ 07:00  --------------------------------------------------------  IN: 100 mL / OUT: 1975 mL / NET: -1875 mL        PHYSICAL EXAM:    Constitutional: resting comfortably in bed; NAD  HEENT: NC/AT, PERRL, EOMI, anicteric sclera, no nasal discharge; uvula midline, no oropharyngeal erythema or exudates; MMM  Neck: supple; no JVD or thyromegaly  Respiratory: CTA B/L; no W/R/R, no retractions  Cardiac: +S1/S2; RRR; no M/R/G; PMI non-displaced  Gastrointestinal: soft, NT/ND; no rebound or guarding; +BSx4  Genitourinary: normal external genitalia  Back: spine midline, no bony tenderness or step-offs; no CVAT B/L  Extremities: WWP, no clubbing or cyanosis; no peripheral edema  Musculoskeletal: NROM x4; no joint swelling, tenderness or erythema  Vascular: 2+ radial, DP/PT pulses B/L  Dermatologic: skin warm, dry and intact; no rashes, wounds, or scars  Lymphatic: no submandibular or cervical LAD  Neurologic: AAOx3; CNII-XII grossly intact; no focal deficits  Psychiatric: affect and characteristics of appearance, verbalizations, behaviors are appropriate, denies SI/HI/AH/VH    MEDICATIONS  (STANDING):  albuterol/ipratropium for Nebulization 3 milliLiter(s) Nebulizer every 6 hours  aspirin  chewable 81 milliGRAM(s) Oral daily  atorvastatin 80 milliGRAM(s) Oral at bedtime  buPROPion XL (24-Hour) . 150 milliGRAM(s) Oral daily  famotidine    Tablet 20 milliGRAM(s) Oral <User Schedule>  finasteride 5 milliGRAM(s) Oral every 24 hours  fluticasone propionate/ salmeterol 100-50 MICROgram(s) Diskus 1 Dose(s) Inhalation two times a day  heparin   Injectable 5000 Unit(s) SubCutaneous every 8 hours  loperamide 2 milliGRAM(s) Oral three times a day  losartan 25 milliGRAM(s) Oral daily  naphazoline/pheniramine Solution 2 Drop(s) Both EYES two times a day  pancrelipase  (CREON 36,000 Lipase Units) 3 Capsule(s) Oral three times a day with meals  piperacillin/tazobactam IVPB.. 4.5 Gram(s) IV Intermittent every 8 hours  sirolimus 2 milliGRAM(s) Oral every 24 hours  sodium chloride 3%  Inhalation 4 milliLiter(s) Inhalation every 12 hours  tamsulosin 0.4 milliGRAM(s) Oral every 24 hours  tiotropium 2.5 MICROgram(s) Inhaler 2 Puff(s) Inhalation daily    MEDICATIONS  (PRN):  acetaminophen     Tablet .. 650 milliGRAM(s) Oral every 6 hours PRN Mild Pain (1 - 3), Moderate Pain (4 - 6)  ondansetron    Tablet 4 milliGRAM(s) Oral every 8 hours PRN Nausea and/or Vomiting  sodium chloride 0.65% Nasal 1 Spray(s) Both Nostrils two times a day PRN Nasal Congestion      Allergies    NSAIDs (Pruritus)  Aleve (Unknown)    Intolerances        LABS:                        10.6   5.26  )-----------( 151      ( 06 Dec 2024 06:14 )             34.5     12-06    133[L]  |  103  |  21  ----------------------------<  86  3.7   |  19[L]  |  2.23[H]    Ca    9.2      06 Dec 2024 06:14  Phos  3.2     12-06  Mg     2.0     12-06    TPro  5.6[L]  /  Alb  2.4[L]  /  TBili  0.3  /  DBili  x   /  AST  14  /  ALT  9[L]  /  AlkPhos  96  12-06      Urinalysis Basic - ( 06 Dec 2024 06:14 )    Color: x / Appearance: x / SG: x / pH: x  Gluc: 86 mg/dL / Ketone: x  / Bili: x / Urobili: x   Blood: x / Protein: x / Nitrite: x   Leuk Esterase: x / RBC: x / WBC x   Sq Epi: x / Non Sq Epi: x / Bacteria: x        RADIOLOGY & ADDITIONAL TESTS:  Reviewed HOSPITAL COURSE: WANDER VAZQUEZ is a 79y year old Male with a PMHx significant for hepatitis C s/p liver transplant in 3/2019 for cryptogenic cirrhosis (on sirolimus for 5 years), MGUS, unspecified autonomic nervous system disorder w/ hx of orthostasis, COPD, Mycobacterium avium complex lung disease, S/P 8 weeks on rx in 2023, D/C'd d/t AEs, chronic anemia, amaurosis fugax, Crohn's disease, BPH, and unspecified pancreatic insufficiency p/ED on 12/2 w/fall w/head-strike, wo LOC W/  in the field. In ED found to have hypertensive emergency w/ /127 w/ encephalopathy, improved to baseline now, and S.Na-118, hypotonic hypovolemic hyponatremia, improved to 133 today, treated w/ hypertonic saline. Sirolimus level 2.7 and hepatology consulted for sirolimus dosing. Failed bedside dysphagia, NG Tube 12/4-12/5, FEES done, SLP recommended soft and bite sized diet, pt able to tolerate PO. Pt tested Coronavirus OC+, on aerobika bid, Pulm consulted for Chronic NADIR, initially statrted on vancomycin+azithromycin+zosyn, de-escalated to zosyn after reviewing [facility] records, advised to fu outpt pulm. Pt currently HDS, being stepped down to [facility], Pending MRI brain.    INTERVAL HPI/OVERNIGHT EVENTS:  Patient was seen and examined at bedside. As per nurse and patient, no o/n events, patient resting comfortably. No complaints at this time. Patient denies: fever, chills, dizziness, weakness, HA, Changes in vision, CP, palpitations, SOB, cough, N/V/D/C, dysuria, changes in bowel movements, LE edema. ROS otherwise negative.    VITAL SIGNS:  T(F): 97.3 (12-06-24 @ 10:25)  HR: 98 (12-06-24 @ 11:50)  BP: 169/115 (12-06-24 @ 11:50)  RR: 18 (12-06-24 @ 11:50)  SpO2: 93% (12-06-24 @ 11:50)  Wt(kg): --      12-05-24 @ 07:01  -  12-06-24 @ 07:00  --------------------------------------------------------  IN: 100 mL / OUT: 1975 mL / NET: -1875 mL        PHYSICAL EXAM:    Constitutional: resting comfortably in bed; NAD  HEENT: NC/AT, PERRL, EOMI, anicteric sclera, MMM  Neck: supple  Respiratory: +rhonchi in all fields   Cardiac: +S1/S2; RRR; no M/R/G  Gastrointestinal: soft, NT/ND; no rebound or guarding; +BSx4  Genitourinary: normal external genitalia, +Dias   Extremities: WWP, no clubbing or cyanosis; no peripheral edema  Musculoskeletal: +swelling of L elbow   Vascular: 2+ radial, DP/PT pulses B/L  Neurologic: AAOx3; CNII-XII grossly intact; no focal deficits    MEDICATIONS  (STANDING):  albuterol/ipratropium for Nebulization 3 milliLiter(s) Nebulizer every 6 hours  aspirin  chewable 81 milliGRAM(s) Oral daily  atorvastatin 80 milliGRAM(s) Oral at bedtime  buPROPion XL (24-Hour) . 150 milliGRAM(s) Oral daily  famotidine    Tablet 20 milliGRAM(s) Oral <User Schedule>  finasteride 5 milliGRAM(s) Oral every 24 hours  fluticasone propionate/ salmeterol 100-50 MICROgram(s) Diskus 1 Dose(s) Inhalation two times a day  heparin   Injectable 5000 Unit(s) SubCutaneous every 8 hours  loperamide 2 milliGRAM(s) Oral three times a day  losartan 25 milliGRAM(s) Oral daily  naphazoline/pheniramine Solution 2 Drop(s) Both EYES two times a day  pancrelipase  (CREON 36,000 Lipase Units) 3 Capsule(s) Oral three times a day with meals  piperacillin/tazobactam IVPB.. 4.5 Gram(s) IV Intermittent every 8 hours  sirolimus 2 milliGRAM(s) Oral every 24 hours  sodium chloride 3%  Inhalation 4 milliLiter(s) Inhalation every 12 hours  tamsulosin 0.4 milliGRAM(s) Oral every 24 hours  tiotropium 2.5 MICROgram(s) Inhaler 2 Puff(s) Inhalation daily    MEDICATIONS  (PRN):  acetaminophen     Tablet .. 650 milliGRAM(s) Oral every 6 hours PRN Mild Pain (1 - 3), Moderate Pain (4 - 6)  ondansetron    Tablet 4 milliGRAM(s) Oral every 8 hours PRN Nausea and/or Vomiting  sodium chloride 0.65% Nasal 1 Spray(s) Both Nostrils two times a day PRN Nasal Congestion      Allergies    NSAIDs (Pruritus)  Aleve (Unknown)    Intolerances        LABS:                        10.6   5.26  )-----------( 151      ( 06 Dec 2024 06:14 )             34.5     12-06    133[L]  |  103  |  21  ----------------------------<  86  3.7   |  19[L]  |  2.23[H]    Ca    9.2      06 Dec 2024 06:14  Phos  3.2     12-06  Mg     2.0     12-06    TPro  5.6[L]  /  Alb  2.4[L]  /  TBili  0.3  /  DBili  x   /  AST  14  /  ALT  9[L]  /  AlkPhos  96  12-06      Urinalysis Basic - ( 06 Dec 2024 06:14 )    Color: x / Appearance: x / SG: x / pH: x  Gluc: 86 mg/dL / Ketone: x  / Bili: x / Urobili: x   Blood: x / Protein: x / Nitrite: x   Leuk Esterase: x / RBC: x / WBC x   Sq Epi: x / Non Sq Epi: x / Bacteria: x        RADIOLOGY & ADDITIONAL TESTS:  Reviewed HOSPITAL COURSE: WANDER VAZQUEZ is a 79y year old Male with a PMHx significant for hepatitis C s/p liver transplant in 3/2019 for cryptogenic cirrhosis (on sirolimus for 5 years), MGUS, unspecified autonomic nervous system disorder w/ hx of orthostasis, COPD, Mycobacterium avium complex lung disease, S/P 8 weeks on rx in 2023, D/C'd d/t AEs, chronic anemia, amaurosis fugax, Crohn's disease, BPH, and unspecified pancreatic insufficiency p/ED on 12/2 w/fall w/head-strike, wo LOC W/  in the field. In ED found to have hypertensive emergency w/ /127 w/ encephalopathy, improved to baseline now, and S.Na-118, hypotonic hypovolemic hyponatremia, improved to 133 today, treated w/ hypertonic saline. Sirolimus level 2.7 and hepatology consulted for sirolimus dosing. Failed bedside dysphagia, NG Tube 12/4-12/5, FEES done, SLP recommended soft and bite sized diet, pt able to tolerate PO. Pt tested Coronavirus OC+, on aerobika bid, Pulm consulted for Chronic NADIR, initially statrted on vancomycin+azithromycin+zosyn, de-escalated to zosyn after reviewing [facility] records, advised to fu outpt pulm. Pt currently HDS, being stepped down to [facility], Pending MRI brain.    INTERVAL HPI/OVERNIGHT EVENTS:  Patient was seen and examined at bedside. As per nurse and patient, no o/n events, patient resting comfortably. Continues to cough however reports feeling better. Wife reports pt mental status is improved since admission. COntinue to have chronci diarrhea. No N/V, CP, SOB at this time.     VITAL SIGNS:  T(F): 97.3 (12-06-24 @ 10:25)  HR: 98 (12-06-24 @ 11:50)  BP: 169/115 (12-06-24 @ 11:50)  RR: 18 (12-06-24 @ 11:50)  SpO2: 93% (12-06-24 @ 11:50)  Wt(kg): --      12-05-24 @ 07:01  -  12-06-24 @ 07:00  --------------------------------------------------------  IN: 100 mL / OUT: 1975 mL / NET: -1875 mL        PHYSICAL EXAM:    Constitutional: resting comfortably in bed; NAD  HEENT: NC/AT, PERRL, EOMI, anicteric sclera, MMM  Neck: supple  Respiratory: +rhonchi in all fields   Cardiac: +S1/S2; RRR; no M/R/G  Gastrointestinal: soft, NT/ND; no rebound or guarding; +BSx4  Genitourinary: normal external genitalia, +Dias   Extremities: WWP, no clubbing or cyanosis; no peripheral edema  Musculoskeletal: +swelling of L elbow   Vascular: 2+ radial, DP/PT pulses B/L  Neurologic: AAOx3; CNII-XII grossly intact; no focal deficits    MEDICATIONS  (STANDING):  albuterol/ipratropium for Nebulization 3 milliLiter(s) Nebulizer every 6 hours  aspirin  chewable 81 milliGRAM(s) Oral daily  atorvastatin 80 milliGRAM(s) Oral at bedtime  buPROPion XL (24-Hour) . 150 milliGRAM(s) Oral daily  famotidine    Tablet 20 milliGRAM(s) Oral <User Schedule>  finasteride 5 milliGRAM(s) Oral every 24 hours  fluticasone propionate/ salmeterol 100-50 MICROgram(s) Diskus 1 Dose(s) Inhalation two times a day  heparin   Injectable 5000 Unit(s) SubCutaneous every 8 hours  loperamide 2 milliGRAM(s) Oral three times a day  losartan 25 milliGRAM(s) Oral daily  naphazoline/pheniramine Solution 2 Drop(s) Both EYES two times a day  pancrelipase  (CREON 36,000 Lipase Units) 3 Capsule(s) Oral three times a day with meals  piperacillin/tazobactam IVPB.. 4.5 Gram(s) IV Intermittent every 8 hours  sirolimus 2 milliGRAM(s) Oral every 24 hours  sodium chloride 3%  Inhalation 4 milliLiter(s) Inhalation every 12 hours  tamsulosin 0.4 milliGRAM(s) Oral every 24 hours  tiotropium 2.5 MICROgram(s) Inhaler 2 Puff(s) Inhalation daily    MEDICATIONS  (PRN):  acetaminophen     Tablet .. 650 milliGRAM(s) Oral every 6 hours PRN Mild Pain (1 - 3), Moderate Pain (4 - 6)  ondansetron    Tablet 4 milliGRAM(s) Oral every 8 hours PRN Nausea and/or Vomiting  sodium chloride 0.65% Nasal 1 Spray(s) Both Nostrils two times a day PRN Nasal Congestion      Allergies    NSAIDs (Pruritus)  Aleve (Unknown)    Intolerances        LABS:                        10.6   5.26  )-----------( 151      ( 06 Dec 2024 06:14 )             34.5     12-06    133[L]  |  103  |  21  ----------------------------<  86  3.7   |  19[L]  |  2.23[H]    Ca    9.2      06 Dec 2024 06:14  Phos  3.2     12-06  Mg     2.0     12-06    TPro  5.6[L]  /  Alb  2.4[L]  /  TBili  0.3  /  DBili  x   /  AST  14  /  ALT  9[L]  /  AlkPhos  96  12-06      Urinalysis Basic - ( 06 Dec 2024 06:14 )    Color: x / Appearance: x / SG: x / pH: x  Gluc: 86 mg/dL / Ketone: x  / Bili: x / Urobili: x   Blood: x / Protein: x / Nitrite: x   Leuk Esterase: x / RBC: x / WBC x   Sq Epi: x / Non Sq Epi: x / Bacteria: x        RADIOLOGY & ADDITIONAL TESTS:  Reviewed

## 2024-12-06 NOTE — DISCHARGE NOTE PROVIDER - PROVIDER TOKENS
FREE:[LAST:[Gilmar],FIRST:[Billy Vallejo],PHONE:[phone]],FAX:[(   )    -],ADDRESS:[[address] [place] [address], [address] [place] [place] [address]],SCHEDULEDAPPT:[12/30/2024],SCHEDULEDAPPTTIME:[11:30 AM],ESTABLISHEDPATIENT:[T]] FREE:[LAST:[Gilmar],FIRST:[Billyradha Voraa],PHONE:[phone]],FAX:[(   )    -],ADDRESS:[[address], [address] [place] [address]],SCHEDULEDAPPT:[12/30/2024],SCHEDULEDAPPTTIME:[11:30 AM],ESTABLISHEDPATIENT:[T]],FREE:[LAST:[Tyler],FIRST:[Latonya],PHONE:[phone]],FAX:[(   )    -],ADDRESS:[[facility] [address] [place] [address]],SCHEDULEDAPPT:[12/27/2024],SCHEDULEDAPPTTIME:[09:30 AM],ESTABLISHEDPATIENT:[T]]

## 2024-12-06 NOTE — OCCUPATIONAL THERAPY INITIAL EVALUATION ADULT - MD ORDER
Fall at home, AMS  Stroke Code  CTs w/ no acute hemorrhages or occlusions  Hypertensive Urgency w/ Metabolic Encephalopathy, pending further w/u

## 2024-12-06 NOTE — PROGRESS NOTE ADULT - PROBLEM SELECTOR PLAN 7
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 Home sirolimus 2mg QD.   - c/w sirolimus 2mg QD S/p liver transplant in 3/2019 for cryptogenic cirrhosis. Home sirolimus 2mg QD.   - c/w sirolimus 2mg QD  -f/u level tomorrow AM   -hepatology following Patient with a hx of CKD, Hep C, Liver tx. Not currently getting dialysis. Baseline Cr 2.74 in early November.   UA- Protein>1000, RBC-26 represents a GN w/ nephritic range proteinuria    [facility] my chart accessed on daughters phone  - 11/21/2024 KFLC-272.2, LFLC- 28.4. K/L- 9.58, m-spike+, IgG-1089, iGa-211, iGm-72  - 11/21/2024  Uric acid-  6.7, LDH- 185  - 6/3/2024- Renal biopsy-                          - H&E- (1) segmental and global glomerulosclerosis, (2) diffuse global glomerulosclerosis (63%), w/ moderate to marked tubular atrophy & interstitial fibrosis w/ marked arterial and arteriolar sclerosis                         - LM - 16/24 glomeruli: globally sclerotic or show chronic hypoperfusion, 6/24: mild to moderate mesangial prominence, 2/24: segmental sclerosis, several bhavik show segmental "double contour" formation                         - IF - 4/6 globally sclerotic, Igm, Kappa, Lambda: trace granular mesagngial staining, IgG, IgA, C1q: no stain, 1+ C3- along tubular BM and in vessels, Casts- bitypic for both light chains. albumin neg, Fibrin neg                         - EM- 1/2 segmental sclerosis w/ subepithelial reparative changes as well as globular deposits consistent w/ hyaline w/ focal foot effacement.  GBM thickness mildly increaed, Subendothelial space:  no electron lucent expansion, No dense deposits. Mesangium: small electron dense deposits. Foci of mesangial interposition. No amyloid.   - 5/7/2024- iPTH- 112  - (3/2024) 24H Urine protein -1710, 75.2% paraprot excretion/24h  - 2/27/2024- 24H urine- 5-HIAA- 3.6 (WNL), Metanephrines- 35 (), normetanephrine- 122 (156-729), MPO neg, PR3 neg    - fu GN workup: Autoimmune (LAWANDA, ANCA, C3, C4, Cryoglobulin, anti-GBM ds, dsDNA), infection (HIV, Hep B, Hep C),   - continue to monitor  - avoid nephrotoxic agents

## 2024-12-06 NOTE — PROGRESS NOTE ADULT - ASSESSMENT
WANDER VAZQUEZ is a 79y year old Male with a PMHx significant for hepatitis C s/p liver transplant in 3/2019 for cryptogenic cirrhosis (on sirolimus for 5 years), unspecified autonomic nervous system disorder w/ hx of orthostasis, COPD, Mycobacterium avium complex lung disease, chronic anemia, amaurosis fugax, Crohn's disease, BPH, and unspecified pancreatic insufficiency. Patient presented to the ED on 12/2 after having fallen at home with a head-strike, without loss of consciousness. Found to be hyponatremic to 118 and found to have hypertensive urgency on presentation. Admitted for further medical management of blood pressure and hyponatremia.     NEURO:  #Metabolic Encephalopathy (RESOLVED)  Patient presented to the ED with AMS in the setting of hyponatremia and hypertensive urgency (-240). AMS improved to baseline A&Ox3 w/blood pressure control. AMS likely 2/2 cerebral edema in the setting of hypertensive urgency.     #Concern for Stroke  Stroke called in ED for AMS and apparent facial droop. Pt states he felt dizzy prior to him falling in the shower.   NIHSS 4, notable for LUE/LLE dysmetria.   CT brain, CTA large vessel study showing no acute hemorrhages or occlusions. Low concern for stroke given improved AMS and metabolic derangements   - Stroke consulted, appreciate recs       - repeat imaging CTH or MRI if symptoms do not improve    CARDIOVASCULAR:  #Hypertensive Urgency with Encephalopathy (IMPROVING)  Patient presented to ED with blood pressure 200/127. S/p nicardipine gtt and lasix 60mg IV in ED. Blood pressure lowered by presentation to floor to 149/92. No longer encephalopathic. Elevated blood pressure could be 2/2 pain w/ head strike.   - continue to monitor blood pressure   /p IV Labetol 10mg x1, ctm BP.    #Troponinemia   Patient presented with an elevated troponin to 115. Tachycardic on presentation. EKG with no ischemic changes, Bedside POCUS showing no wall motion abnormalities. Elevated troponin in the setting of demand ischemia and CKD.    - trend troponin to peak    #HFmREF  [facility] my chart accessed on daughters phone  - Myocardial SPECT-  LVEF 44%, EKG- SR, rare PVC (11/2024)  - Tc-99 PYP amyloid scan- negative for TTR amyloid (10/29/2024)  - US Heart- small pericardial effusion (9/5/2024)  - pRO-bnp-2080 (9/22/2024)    Patient with elevated BNP to 22,740. Patient wife reports hx of elevated BNP to 35k. Bedside POCUS showing an EF of ˜45%, no wall motion abnormalities, w/ dilated IVC on echo. Patient is s/p lasix 60mg IV in ED. Follows at [facility] cardiology.   - EF- 35%  - Strict Is&Os   - f/u formal TTE  - hold on additional diuresis for now    #Hypertensive emergency  p/w fall w/ garbled speech w/ /140 in the field and 200/127 in ED, managed w/ cardine drip initially and currently on labetolol IV PRN.    [facility] my chart accessed on [facility] phone  - aldosterone <1, plasma renin 0.511    - Reduce BP by 25% IN 24 hrs  - ctm vitals  - US Abdomen w/ doppler: NO SUKHJINDER,   - Plasma renin activity and aldosterone level WNL    PULMONARY:  #COPD  #MAC  # R/O lung malignancy  #R/O active TB  Presentation: iWOB, recent sick contact: wife (ROBI), BiPAP ---->NC. Home meds: Advair, Spiriva, hypertonic saline. Does not use home O2.   CT showing b/l cavitary+solid lung lesion concerning for primary lung malignancy, RUL s/p wedge resection, cannot exclude TB or fungal etiology w/ small left pleural effusion and Diffuse interstitial pulmonary edema.    [facility] my chart accessed on [facility] phone  -11/21/2024- Sputum AFB smear+, c/s Mycobacterium abscessus+ (also positive in june and july 2024)  - 07/02/2024- candida albicans+  - 9/5/2024- V/Q scan- low probability of PE  - 05/2024- Histoplasma neg, aspergillus neg, fungitell neg  - 3/2024- CT Chest wo contrast: waxing and waning nodular opacities b/l w/ enlarging ALEC 1.8cm nodule, RUL cavity stable and consistent with Chronic MAC.   - Treated for Mac for 8 weeks in summer 2023 but d/c'd given side effects mary anne with rifampin including diarrhea and orthostasis which are still ongoing.    - wean O2 as tolerated for a goal O2 saturation 82-92% given COPD  - continue to monitor respiratory status  - Pulmonology consult to establish dx: infectious vs autoimmune vs malignancy etiology  - fu sputum culture, induced sputum culture, strep PNA, Legionella, RSV, MRSA, Galactomannan, fungitell  - fu autoimune hairston to r/o GPA, sarcoid  - c/w zosyn 4.5g   - Pt unable to tolerate NADIR rx previously, fu oupt pulm for further mx and optimization  - fu pulmonology recs  - fu MRI Brain to r/o brain mets    #Pulmonary Nodules  CT chest with a preliminary read showing multiple cavitary and soft tissue mass lesions in bilateral lungs.   - f/u outpatient for workup  - PET neg for Cancer    #Nasal wall ulcer  2017 BX- SCC+    RENAL:  #Hyponatremia  Na+ 118 on presentation. Normal sodium in early November. Serum osmolality 260, urine osmolality 283, urine sodium 57. Hypovolemic on physical exam: dry MM and decreased skin turgor.  Etiology: Hypovolemic hyponatremia most likely in setting of chronic diarrhea after NADIR rx  - Na 133 12/6  - Hypertonic saline w/ goal Na increase of 6-8meq over 24h  - continue to monitor w/ Q4H BMP and q12h urine studies  - fu nephrology recs  -TSH and AM cortisol WNL  - strict I and O    #Hypokalemia  #Hypomagnesemia  Likely in the setting of poor PO intake  Continue to monitor, replete as needed    #CKD   Patient with a hx of CKD, Hep C, Liver tx. Not currently getting dialysis. Baseline Cr 2.74 in early November.   UA- Protein>1000, RBC-26 represents a GN w/ nephritic range proteinuria    [facility] my chart accessed on daughters phone  - 11/21/2024 KFLC-272.2, LFLC- 28.4. K/L- 9.58, m-spike+, IgG-1089, iGa-211, iGm-72  - 11/21/2024  Uric acid-  6.7, LDH- 185  - 6/3/2024- Renal biopsy-                          - H&E- (1) segmental and global glomerulosclerosis, (2) diffuse global glomerulosclerosis (63%), w/ moderate to marked tubular atrophy & interstitial fibrosis w/ marked arterial and arteriolar sclerosis                         - LM - 16/24 glomeruli: globally sclerotic or show chronic hypoperfusion, 6/24: mild to moderate mesangial prominence, 2/24: segmental sclerosis, several bhavik show segmental "double contour" formation                         - IF - 4/6 globally sclerotic, Igm, Kappa, Lambda: trace granular mesagngial staining, IgG, IgA, C1q: no stain, 1+ C3- along tubular BM and in vessels, Casts- bitypic for both light chains. albumin neg, Fibrin neg                         - EM- 1/2 segmental sclerosis w/ subepithelial reparative changes as well as globular deposits consistent w/ hyaline w/ focal foot effacement.  GBM thickness mildly increaed, Subendothelial space:  no electron lucent expansion, No dense deposits. Mesangium: small electron dense deposits. Foci of mesangial interposition. No amyloid.   - 5/7/2024- iPTH- 112  - (3/2024) 24H Urine protein -1710, 75.2% paraprot excretion/24h  - 2/27/2024- 24H urine- 5-HIAA- 3.6 (WNL), Metanephrines- 35 (), normetanephrine- 122 (156-729), MPO neg, PR3 neg    - fu GN workup: Autoimmune (LAWANDA, ANCA, C3, C4, Cryoglobulin, anti-GBM ds, dsDNA), infection (HIV, Hep B, Hep C),   - continue to monitor  - avoid nephrotoxic agents       GI/:  #Liver transplant  S/p liver transplant in 3/2019 for cryptogenic cirrhosis. Home sirolimus 2mg QD.   - c/w sirolimus 2mg QD    #Unspecified Pancreatic Insufficiency   #Diarrhea  Elevated Alk Phos to 148 on admission. Likely elevated in the setting of pancreatic insufficiency.     [facility] my chart accessed on daughters phone  12/22/2023 MRI/MRCP abdomen w/ wo IV con: few pancreatic cystic lesions w/ indolent growth 1.6cm within pancreatic tail. no suspicious internal features, no main uct dilation. No solid mass  - C-Peptide- 10.4 (range 1.1-4.4)  - Fructosamine: 338 (0-285)  - low fecal elastatse 10/2023  - CMV+ 10/2023  - f/u GGT   - c/w home Creon   - c/w home loperamide     #NPO   Patient did not pass bedside dysphagia overnight 12/2-12/3. Patient NPO for now.   - Will reassess in AM    HEME:  #Chronic Anemia   Hemoglobin 11.8 on presentation. Could be 2/2 anemia of chronic disease or iron deficiency given CKD.   - continue to monitor    #MGUS  - elevated FLC w/ negative BM bx (7/2023)    ID:  #SIRS  #Leukocytosis  Met SIRS with tachypnea, tachycardia, and leukocytosis. Afebrile. Did have recent illness of URI. Low suspicion for bacterial infection at this time.   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 (Range, 2-96)                     PPX:  Fluids: fluid restriction of < 1L  Electrolytes: replete as needed  Nutrition: Diet, NPO:   Except Medications (12-03-24 @ 04:43) [Active]  PPI ppx: home famotidine 20mg BID  Dvt ppx: heparin 5000units subQ Q8H  Activity: fall precautions   Code Status: full code   WANDER VAZQUEZ is a 79y year old Male with a PMHx significant for hepatitis C s/p liver transplant in 3/2019 for cryptogenic cirrhosis (on sirolimus for 5 years), unspecified autonomic nervous system disorder w/ hx of orthostasis, COPD, Mycobacterium avium complex lung disease, chronic anemia, amaurosis fugax, Crohn's disease, BPH, and unspecified pancreatic insufficiency. Patient presented to the ED on 12/2 after having fallen at home with a head-strike, without loss of consciousness. Found to be hyponatremic to 118 and found to have hypertensive urgency on presentation. Admitted for further medical management of blood pressure and hyponatremia.

## 2024-12-06 NOTE — PROGRESS NOTE ADULT - PROBLEM SELECTOR PLAN 4
Presentation: iWOB, recent sick contact: wife (ROBI), BiPAP ---->NC. Home meds: Advair, Spiriva, hypertonic saline. Does not use home O2.   CT showing b/l cavitary+solid lung lesion concerning for primary lung malignancy, RUL s/p wedge resection, cannot exclude TB or fungal etiology w/ small left pleural effusion and Diffuse interstitial pulmonary edema.    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 Patient presented with an elevated troponin to 115. Tachycardic on presentation. EKG with no ischemic changes, Bedside POCUS showing no wall motion abnormalities. Elevated troponin in the setting of demand ischemia and CKD.    - trend troponin to peak

## 2024-12-06 NOTE — PROGRESS NOTE ADULT - PROBLEM SELECTOR PLAN 9
Hemoglobin 11.8 on presentation. Could be 2/2 anemia of chronic disease or iron deficiency given CKD.   - continue to monitor Elevated Alk Phos to 148 on admission. Likely elevated in the setting of pancreatic insufficiency. Pt has chronic diarrhea.     [facility] my chart accessed on daughters phone  12/22/2023 MRI/MRCP abdomen w/ wo IV con: few pancreatic cystic lesions w/ indolent growth 1.6cm within pancreatic tail. no suspicious internal features, no main uct dilation. No solid mass  - C-Peptide- 10.4 (range 1.1-4.4)  - Fructosamine: 338 (0-285)  - low fecal elastatse 10/2023  - CMV+ 10/2023  - f/u GGT   - c/w home Creon   - c/w home loperamide

## 2024-12-06 NOTE — PROGRESS NOTE ADULT - SUBJECTIVE AND OBJECTIVE BOX
*****INCOMPLETE NOTE*****  HOSPITAL COURSE: WANDER VAZQUEZ is a 79y year old Male with a PMHx significant for hepatitis C s/p liver transplant in 3/2019 for cryptogenic cirrhosis (on sirolimus for 5 years), MGUS, unspecified autonomic nervous system disorder w/ hx of orthostasis, COPD, Mycobacterium avium complex lung disease, S/P 8 weeks on rx in 2023, D/C'd d/t AEs, chronic anemia, amaurosis fugax, Crohn's disease, BPH, and unspecified pancreatic insufficiency p/ED on 12/2 w/fall w/head-strike, wo LOC W/  in the field. In ED found to have hypertensive emergency w/ /127 w/ encephalopathy, improved to baseline now, and S.Na-118, hypotonic hypovolemic hyponatremia, improved to 133 today, treated w/ hypertonic saline. Sirolimus level 2.7 and hepatology consulted for sirolimus dosing. Failed bedside dysphagia, NG Tube 12/4-12/5, FEES done, SLP recommended soft and bite sized diet, pt able to tolerate PO. Pt tested Coronavirus OC+, on aerobika bid, Pulm consulted for Chronic NADIR, initially statrted on vancomycin+azithromycin+zosyn, de-escalated to zosyn after reviewing [facility] records, advised to fu outpt pulm. Pt currently HDS, being stepped down to RMF, Pending MRI brain.    INTERVAL HPI/OVERNIGHT EVENTS: HILARY    SUBJECTIVE: Patient seen and examined at bedside, resting comfortably in bed, and does not appear to be in any acute distress.     Vital Signs Last 24 Hrs  T(C): 36.3 (06 Dec 2024 10:25), Max: 36.7 (06 Dec 2024 06:10)  T(F): 97.3 (06 Dec 2024 10:25), Max: 98.1 (06 Dec 2024 06:10)  HR: 100 (06 Dec 2024 10:33) (80 - 102)  BP: 165/108 (06 Dec 2024 10:33) (129/77 - 184/106)  BP(mean): 131 (06 Dec 2024 10:33) (97 - 141)  RR: 18 (06 Dec 2024 08:21) (16 - 18)  SpO2: 95% (06 Dec 2024 10:33) (95% - 98%)    Parameters below as of 06 Dec 2024 08:21  Patient On (Oxygen Delivery Method): nasal cannula w/ humidification  O2 Flow (L/min): 2      PHYSICAL EXAM:  General: in no acute distress  Eyes: EOMI intact bilaterally. Anicteric sclerae, moist conjunctivae  HENT: Moist mucous membranes  Neck: Trachea midline, supple  Lungs: CTA B/L. No wheezes, rales, or rhonchi  Cardiovascular: RRR. No murmurs, rubs, or gallops  Abdomen: Soft, non-tender non-distended; No rebound or guarding  Extremities: WWP, No clubbing, cyanosis or edema  Neurological: Alert and oriented  Skin: Warm and dry. No obvious rash     LABS:                        10.6   5.26  )-----------( 151      ( 06 Dec 2024 06:14 )             34.5     12-06    133[L]  |  103  |  21  ----------------------------<  86  3.7   |  19[L]  |  2.23[H]    Ca    9.2      06 Dec 2024 06:14  Phos  3.2     12-06  Mg     2.0     12-06    TPro  5.6[L]  /  Alb  2.4[L]  /  TBili  0.3  /  DBili  x   /  AST  14  /  ALT  9[L]  /  AlkPhos  96  12-06   HOSPITAL COURSE: WANDER VAZQUEZ is a 79y year old Male with a PMHx significant for hepatitis C s/p liver transplant in 3/2019 for cryptogenic cirrhosis (on sirolimus for 5 years), MGUS, unspecified autonomic nervous system disorder w/ hx of orthostasis, COPD, Mycobacterium avium complex lung disease, S/P 8 weeks on rx in 2023, D/C'd d/t AEs, chronic anemia, amaurosis fugax, Crohn's disease, BPH, and unspecified pancreatic insufficiency p/ED on 12/2 w/fall w/head-strike, wo LOC W/  in the field. In ED found to have hypertensive emergency w/ /127 w/ encephalopathy, improved to baseline now, and S.Na-118, hypotonic hypovolemic hyponatremia, improved to 133 today, treated w/ hypertonic saline. Sirolimus level 2.7 and hepatology consulted for sirolimus dosing. Failed bedside dysphagia, NG Tube 12/4-12/5, FEES done, SLP recommended soft and bite sized diet, pt able to tolerate PO. Pt tested Coronavirus OC+, on aerobika bid, Pulm consulted for Chronic NADIR, initially statrted on vancomycin+azithromycin+zosyn, de-escalated to zosyn after reviewing [facility] records, advised to fu outpt pulm. Pt currently HDS, being stepped down to RMF, Pending MRI brain.    INTERVAL HPI/OVERNIGHT EVENTS: HILARY    SUBJECTIVE: Patient seen and examined at bedside, resting comfortably in bed, and does not appear to be in any acute distress.     Vital Signs Last 24 Hrs  T(C): 36.3 (06 Dec 2024 10:25), Max: 36.7 (06 Dec 2024 06:10)  T(F): 97.3 (06 Dec 2024 10:25), Max: 98.1 (06 Dec 2024 06:10)  HR: 100 (06 Dec 2024 10:33) (80 - 102)  BP: 165/108 (06 Dec 2024 10:33) (129/77 - 184/106)  BP(mean): 131 (06 Dec 2024 10:33) (97 - 141)  RR: 18 (06 Dec 2024 08:21) (16 - 18)  SpO2: 95% (06 Dec 2024 10:33) (95% - 98%)    Parameters below as of 06 Dec 2024 08:21  Patient On (Oxygen Delivery Method): nasal cannula w/ humidification  O2 Flow (L/min): 2      PHYSICAL EXAM:  General: in no acute distress  Eyes: EOMI intact bilaterally. Anicteric sclerae, moist conjunctivae  HENT: Moist mucous membranes  Neck: Trachea midline, supple  Lungs: CTA B/L. No wheezes, rales, or rhonchi  Cardiovascular: RRR. No murmurs, rubs, or gallops  Abdomen: Soft, non-tender non-distended; No rebound or guarding  Extremities: WWP, No clubbing, cyanosis or edema  Neurological: Alert and oriented  Skin: Warm and dry. No obvious rash     LABS:                        10.6   5.26  )-----------( 151      ( 06 Dec 2024 06:14 )             34.5     12-06    133[L]  |  103  |  21  ----------------------------<  86  3.7   |  19[L]  |  2.23[H]    Ca    9.2      06 Dec 2024 06:14  Phos  3.2     12-06  Mg     2.0     12-06    TPro  5.6[L]  /  Alb  2.4[L]  /  TBili  0.3  /  DBili  x   /  AST  14  /  ALT  9[L]  /  AlkPhos  96  12-06   **********TRANSFER NOTE FROM [facility] TO [facility]*************    HOSPITAL COURSE: WANDER VAZQUEZ is a 79y year old Male with a PMHx significant for hepatitis C s/p liver transplant in 3/2019 for cryptogenic cirrhosis (on sirolimus for 5 years), MGUS, unspecified autonomic nervous system disorder w/ hx of orthostasis, COPD, Mycobacterium avium complex lung disease, S/P 8 weeks on rx in 2023, D/C'd d/t AEs, chronic anemia, amaurosis fugax, Crohn's disease, BPH, and unspecified pancreatic insufficiency p/ED on 12/2 w/fall w/head-strike, wo LOC W/  in the field. In ED found to have hypertensive emergency w/ /127 w/ encephalopathy, improved to baseline now, and S.Na-118, hypotonic hypovolemic hyponatremia, improved to 133 today, treated w/ hypertonic saline. Sirolimus level 2.7 and hepatology consulted for sirolimus dosing. Failed bedside dysphagia, NG Tube 12/4-12/5, FEES done, SLP recommended soft and bite sized diet, pt able to tolerate PO. Pt tested Coronavirus OC+, on aerobika bid, Pulm consulted for Chronic NADIR, initially statrted on vancomycin+azithromycin+zosyn, de-escalated to zosyn after reviewing [facility] records, advised to fu outpt pulm. Pt currently HDS, being stepped down to RMF, Pending MRI brain.    INTERVAL HPI/OVERNIGHT EVENTS: HILARY    SUBJECTIVE: Patient seen and examined at bedside, resting comfortably in bed, and does not appear to be in any acute distress.     Vital Signs Last 24 Hrs  T(C): 36.3 (06 Dec 2024 10:25), Max: 36.7 (06 Dec 2024 06:10)  T(F): 97.3 (06 Dec 2024 10:25), Max: 98.1 (06 Dec 2024 06:10)  HR: 100 (06 Dec 2024 10:33) (80 - 102)  BP: 165/108 (06 Dec 2024 10:33) (129/77 - 184/106)  BP(mean): 131 (06 Dec 2024 10:33) (97 - 141)  RR: 18 (06 Dec 2024 08:21) (16 - 18)  SpO2: 95% (06 Dec 2024 10:33) (95% - 98%)    Parameters below as of 06 Dec 2024 08:21  Patient On (Oxygen Delivery Method): nasal cannula w/ humidification  O2 Flow (L/min): 2      PHYSICAL EXAM:  General: in no acute distress  Eyes: EOMI intact bilaterally. Anicteric sclerae, moist conjunctivae  HENT: Moist mucous membranes  Neck: Trachea midline, supple  Lungs: CTA B/L. No wheezes, rales, or rhonchi  Cardiovascular: RRR. No murmurs, rubs, or gallops  Abdomen: Soft, non-tender non-distended; No rebound or guarding  Extremities: WWP, No clubbing, cyanosis or edema  Neurological: Alert and oriented  Skin: Warm and dry. No obvious rash     LABS:                        10.6   5.26  )-----------( 151      ( 06 Dec 2024 06:14 )             34.5     12-06    133[L]  |  103  |  21  ----------------------------<  86  3.7   |  19[L]  |  2.23[H]    Ca    9.2      06 Dec 2024 06:14  Phos  3.2     12-06  Mg     2.0     12-06    TPro  5.6[L]  /  Alb  2.4[L]  /  TBili  0.3  /  DBili  x   /  AST  14  /  ALT  9[L]  /  AlkPhos  96  12-06

## 2024-12-06 NOTE — PROGRESS NOTE ADULT - PROBLEM SELECTOR PLAN 11
Fluids: fluid restriction of < 1L  Electrolytes: replete as needed  Nutrition: Diet, NPO:   Except Medications (12-03-24 @ 04:43) [Active]  PPI ppx: home famotidine 20mg BID  Dvt ppx: heparin 5000units subQ Q8H  Activity: fall precautions   Code Status: full code - elevated FLC w/ negative BM bx (7/2023)  -Hypoglobulinemia  5/3/2024: IgG4: 1 (Range, 2-96)    Follow at [facility] with Hematology - elevated FLC w/ negative BM bx (7/2023)  -Hypoglobulinemia  5/3/2024: IgG4: 1 (Range, 2-96)    Follow at [facility] with Hematology  -palliative care consulted for GOC in setting of multiple comorbidities

## 2024-12-06 NOTE — PHYSICAL THERAPY INITIAL EVALUATION ADULT - PERTINENT HX OF CURRENT PROBLEM, REHAB EVAL
79y year old Male with a PMHx significant for hepatitis C s/p liver transplant in 3/2019 for cryptogenic cirrhosis (on sirolimus for 5 years), unspecified autonomic nervous system disorder w/ hx of orthostasis, COPD, Mycobacterium avium complex lung disease, chronic anemia, amaurosis fugax, Crohn's disease, BPH, and unspecified pancreatic insufficiency. Patient presented to the ED on 12/2 after having fallen at home with a head-strike, without loss of consciousness. Most of history is taken from patient's wife, who joined patient at bedside in ED. Patient's wife was in the shower and patient's wife heard a thud, saw him down on the ground and patient was altered. On presentation his blood pressure was 200/127 without vision changes, or chest pain (patient allegedly had BP of 240/140 when initially found by EMS). Of note, the patient's wife had a recent URI for which the wife suspects the patient may have started developing symptoms 1 week ago. Patient was altered in the ED.

## 2024-12-06 NOTE — DISCHARGE NOTE PROVIDER - ATTENDING DISCHARGE PHYSICAL EXAMINATION:
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     Hypertension - previously hypotensive requiring midodrine now on multiple BP agents. Cont losartan 100mg, carvedilol 3.125mg BID, isordil 10mg TID, hydralazine 25mg TID. Close outpatient follow up.     LILIAN on CKD - appreciate renal recs, underwent renal biopsy showing advanced CKD with advanced IFTA (possible nephrosclerosis w/o evidence of EM, no c/f MGRS). Creatinine improved, renally dose medications, outpatient follow up with nephro.     Encephalopathy - resolved, stroke workup negative.     R knee pain - c/f gout (had been off allopurinol) improved with colchicine (0.3mg qd x4d), avoid nsaid/steroids. Lidocaine patch. Resume allopurinol.     Chronic HFrEF - not in acute exacerbation. Outpatient follow up for GDMT optimization and workup for etiology.     Chronic MAC - not currently on treatment, evaluated by pulm who suspect viral illness exacerbated his underlying NTM. Transitioned pip-tazo to amox-clav on discharge to complete 14 day course.     Pituitary lesion - incidental <1cm cystic lesion in anterior pituitary. Close outpatient follow up for hormonal testing and surveillance imaging.     Danna -  YOANA  79 YOM with PMH of cryptogenic cirrhosis s/p liver transplant (2019 now on sirolimus), unspecified autonomic nervous system disorder with orthostasis, COPD, chronic MAC, chronic anemia, amaurosis fugax, Crohn’s disease, BPH, pancreatic insufficiency, chronic HFmrEF who presented with AMS found to have hypertensive emergency and severe hyponatremia with LILIAN on CKD.     Hypertension - previously hypotensive requiring midodrine now on multiple BP agents. Cont losartan 100mg, carvedilol 3.125mg BID, isordil 10mg TID, hydralazine 25mg TID. Close outpatient follow up.     LILIAN on CKD - appreciate renal recs, underwent renal biopsy showing advanced CKD with advanced IFTA (possible nephrosclerosis w/o evidence of EM, no c/f MGRS). Creatinine improved, renally dose medications, outpatient follow up with nephro.     Encephalopathy - resolved, stroke workup negative.     R knee pain - c/f gout (had been off allopurinol) improved with colchicine (0.3mg qd x4d), avoid nsaid/steroids. Lidocaine patch. Resume allopurinol.     Chronic HFrEF - not in acute exacerbation. Outpatient follow up for GDMT optimization and workup for etiology.     Chronic MAC - not currently on treatment, evaluated by pulm who suspect viral illness exacerbated his underlying NTM. Transitioned pip-tazo to amox-clav on discharge to complete 14 day course.     Pituitary lesion - incidental <1cm cystic lesion in anterior pituitary. Close outpatient follow up for hormonal testing and surveillance imaging.     Dispo -  YOANA         Query response:  Classification fo sepsis: Sepsis was ruled out. Non-infectious SIRS was present on admission.  Classification of PNA: Other (specify) - patient does not have CAP, HAP, aspiration PNA, gram-negative PNA, or MRSA PNA. He has chronic non-tuberculosis mycobacterial disease of lungs.

## 2024-12-06 NOTE — DISCHARGE NOTE PROVIDER - NSDCCPCAREPLAN_GEN_ALL_CORE_FT
PRINCIPAL DISCHARGE DIAGNOSIS  Diagnosis: Hypertensive crisis  Assessment and Plan of Treatment: Hypertension is the medical term for high blood pressure. Blood pressure refers to the pressure that blood applies to the inner walls of the arteries. Arteries carry blood from the heart to other organs and parts of the body. Untreated high blood pressure increases the strain on the heart and arteries, eventually causing organ damage. High blood pressure increases the risk of heart failure, heart attack (myocardial infarction), stroke, and kidney failure. High blood pressure does not usually cause any symptoms. Treatment of hypertension usually begins with lifestyle changes. Making these lifestyle changes involves little or no risk. Recommended changes often include reducing the amount of salt in your diet, losing weight if you are overweight or obese, avoiding drinking too much alcohol, stopping smoking and exercising at least 30 minutes per day most days of the week. If you are prescribed medication for your hypertension it is important to take these as prescribed to prevent the possible complications of uncontrolled hypertension.        SECONDARY DISCHARGE DIAGNOSES  Diagnosis: Hyponatremia  Assessment and Plan of Treatment: Hyponatremia is the medical term for having too little sodium in the blood. Sodium is one of the many substances called "electrolytes" that help carry electrical signals between cells. This is important because many cells rely on electrical signals to work right. There are many symptoms of hyponatremia, including nausea/vomiting, headache, confusion, feeling tired, irritability, muscle weakness/cramps/spasms, or seizures. Hyponatremia happens when the body holds onto too much water, diluting the amount of sodium in the blood. This can happen because of certain meical conditions, such as heart failure (a medical condition where the heart does not pump as well as it should), cirrhosis (a severe form of liver disease), kidney disease, and lung disease. Hyponatremia can also be caused by certain medications, poor diet, and severe vomiting or diarrhea.   We treated your hyponatremia by (TREATMENT)       PRINCIPAL DISCHARGE DIAGNOSIS  Diagnosis: Hypertensive crisis  Assessment and Plan of Treatment: Hypertension is the medical term for high blood pressure. Blood pressure refers to the pressure that blood applies to the inner walls of the arteries. Arteries carry blood from the heart to other organs and parts of the body. Untreated high blood pressure increases the strain on the heart and arteries, eventually causing organ damage. High blood pressure increases the risk of heart failure, heart attack (myocardial infarction), stroke, and kidney failure. High blood pressure does not usually cause any symptoms. Treatment of hypertension usually begins with lifestyle changes. Making these lifestyle changes involves little or no risk. Recommended changes often include reducing the amount of salt in your diet, losing weight if you are overweight or obese, avoiding drinking too much alcohol, stopping smoking and exercising at least 30 minutes per day most days of the week. If you are prescribed medication for your hypertension it is important to take these as prescribed to prevent the possible complications of uncontrolled hypertension.        SECONDARY DISCHARGE DIAGNOSES  Diagnosis: Hyponatremia  Assessment and Plan of Treatment: Hyponatremia is the medical term for having too little sodium in the blood. Sodium is one of the many substances called "electrolytes" that help carry electrical signals between cells. This is important because many cells rely on electrical signals to work right. There are many symptoms of hyponatremia, including nausea/vomiting, headache, confusion, feeling tired, irritability, muscle weakness/cramps/spasms, or seizures. Hyponatremia happens when the body holds onto too much water, diluting the amount of sodium in the blood. This can happen because of certain meical conditions, such as heart failure (a medical condition where the heart does not pump as well as it should), cirrhosis (a severe form of liver disease), kidney disease, and lung disease. Hyponatremia can also be caused by certain medications, poor diet, and severe vomiting or diarrhea.   It is important to take all your medications as prescribed and follow up with your physicians as scheduled. Please followup with Dr. Schafer on 12/30 @11:30 and your PCP Latonya Scott on 12/27 @242

## 2024-12-06 NOTE — PROGRESS NOTE ADULT - PROBLEM SELECTOR PLAN 10
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 Could be 2/2 anemia of chronic disease or iron deficiency given CKD.   - continue to monitor

## 2024-12-06 NOTE — DISCHARGE NOTE PROVIDER - CARE PROVIDER_API CALL
Billy Schafer  [address], [address] [place] [address], [place] [address]  Phone: [phone]  Fax: (   )    -  Established Patient  Scheduled Appointment: 12/30/2024 11:30 AM   Billy Schafer  [address], [address] [place] [address]  Phone: [phone]  Fax: (   )    -  Established Patient  Scheduled Appointment: 12/30/2024 11:30 AM    Latonya Scott  [address] [place] [address]  Phone: [phone]  Fax: (   )    -  Established Patient  Scheduled Appointment: 12/27/2024 09:30 AM

## 2024-12-06 NOTE — DISCHARGE NOTE PROVIDER - DETAILS OF MALNUTRITION DIAGNOSIS/DIAGNOSES
This patient has been assessed with a concern for Malnutrition and was treated during this hospitalization for the following Nutrition diagnosis/diagnoses:     -  12/05/2024: Severe protein-calorie malnutrition

## 2024-12-06 NOTE — PHYSICAL THERAPY INITIAL EVALUATION ADULT - ADDITIONAL COMMENTS
Pt resides in elevator building with a few steps to enter or ramp access, pt is right handed, wears reading glasses, no device use or DME prior to admission. Pt. denies hx of falls. Does not recall events that brought him to hospital, but states he was told why he is here.

## 2024-12-06 NOTE — PROGRESS NOTE ADULT - PROBLEM SELECTOR PLAN 6
Patient with a hx of CKD, Hep C, Liver tx. Not currently getting dialysis. Baseline Cr 2.74 in early November.   UA- Protein>1000, RBC-26 represents a GN w/ nephritic range proteinuria    [facility] my chart accessed on daughters phone  - 11/21/2024 KFLC-272.2, LFLC- 28.4. K/L- 9.58, m-spike+, IgG-1089, iGa-211, iGm-72  - 11/21/2024  Uric acid-  6.7, LDH- 185  - 6/3/2024- Renal biopsy-                          - H&E- (1) segmental and global glomerulosclerosis, (2) diffuse global glomerulosclerosis (63%), w/ moderate to marked tubular atrophy & interstitial fibrosis w/ marked arterial and arteriolar sclerosis                         - LM - 16/24 glomeruli: globally sclerotic or show chronic hypoperfusion, 6/24: mild to moderate mesangial prominence, 2/24: segmental sclerosis, several bhavik show segmental "double contour" formation                         - IF - 4/6 globally sclerotic, Igm, Kappa, Lambda: trace granular mesagngial staining, IgG, IgA, C1q: no stain, 1+ C3- along tubular BM and in vessels, Casts- bitypic for both light chains. albumin neg, Fibrin neg                         - EM- 1/2 segmental sclerosis w/ subepithelial reparative changes as well as globular deposits consistent w/ hyaline w/ focal foot effacement.  GBM thickness mildly increaed, Subendothelial space:  no electron lucent expansion, No dense deposits. Mesangium: small electron dense deposits. Foci of mesangial interposition. No amyloid.   - 5/7/2024- iPTH- 112  - (3/2024) 24H Urine protein -1710, 75.2% paraprot excretion/24h  - 2/27/2024- 24H urine- 5-HIAA- 3.6 (WNL), Metanephrines- 35 (), normetanephrine- 122 (156-729), MPO neg, PR3 neg    - fu GN workup: Autoimmune (LAWANDA, ANCA, C3, C4, Cryoglobulin, anti-GBM ds, dsDNA), infection (HIV, Hep B, Hep C),   - continue to monitor  - avoid nephrotoxic agents - Treated for Mac for 8 weeks in summer 2023 but d/c'd given side effects mary anne with rifampin including diarrhea and orthostasis which are still ongoing.

## 2024-12-06 NOTE — OCCUPATIONAL THERAPY INITIAL EVALUATION ADULT - PERTINENT HX OF CURRENT PROBLEM, REHAB EVAL
79y year old Male with a PMHx significant for hepatitis C s/p liver transplant in 3/2019 for cryptogenic cirrhosis (on sirolimus for 5 years), unspecified autonomic nervous system disorder w/ hx of orthostasis, COPD, Mycobacterium avium complex lung disease, chronic anemia, amaurosis fugax, Crohn's disease, BPH, and unspecified pancreatic insufficiency. Patient presented to the ED on 12/2 after having fallen at home with a head-strike, without loss of consciousness. Found to be hyponatremic to 118 and found to have hypertensive urgency on presentation. Admitted for further medical management of blood pressure and hyponatremia.

## 2024-12-06 NOTE — PHYSICAL THERAPY INITIAL EVALUATION ADULT - GAIT DEVIATIONS NOTED, PT EVAL
decreased rena/increased time in double stance/decreased velocity of limb motion/decreased step length/decreased stride length

## 2024-12-06 NOTE — PROGRESS NOTE ADULT - CONVERSATION DETAILS
Explored code status and GOC, patient states has never heard of code status before or had these conversations. He wishes to discuss with his wife prior to making any changes to code status or GOC.

## 2024-12-06 NOTE — PHYSICAL THERAPY INITIAL EVALUATION ADULT - MODALITIES TREATMENT COMMENTS
CN testing - face symmetric, tongue midline, vision intact with smooth pursuit, peripheral fields intact, equal shoulder shrug

## 2024-12-06 NOTE — PROGRESS NOTE ADULT - SUBJECTIVE AND OBJECTIVE BOX
Evaluated at bedside, stable, non oliguric, ?sCr plateauing ~ 2.2.     PHYSICAL EXAM:  General:NAD.   Cardiovascular: +S1/S2, RRR  Respiratory: CTA B/L; no W/R/R  Gastrointestinal: soft, NT/ND; +BSx4  Extremities:  no edema  Neurological: AAOx3; no focal deficits  : Dias +    VITALS:  T(F): 98.1 (12-06-24 @ 06:10), Max: 98.1 (12-06-24 @ 06:10)  HR: 92 (12-06-24 @ 08:21)  BP: 179/113 (12-06-24 @ 08:21)  RR: 18 (12-06-24 @ 08:21)  SpO2: 98% (12-06-24 @ 08:21)  Wt(kg): --    12-04 @ 07:01  -  12-05 @ 07:00  --------------------------------------------------------  IN: 580 mL / OUT: 1925 mL / NET: -1345 mL    12-05 @ 07:01  -  12-06 @ 07:00  --------------------------------------------------------  IN: 100 mL / OUT: 1975 mL / NET: -1875 mL          LABS:  12-06    133[L]  |  103  |  21  ----------------------------<  86  3.7   |  19[L]  |  2.23[H]    Ca    9.2      06 Dec 2024 06:14  Phos  3.2     12-06  Mg     2.0     12-06    TPro  5.6[L]  /  Alb  2.4[L]  /  TBili  0.3  /  DBili      /  AST  14  /  ALT  9[L]  /  AlkPhos  96  12-06                          10.6   5.26  )-----------( 151      ( 06 Dec 2024 06:14 )             34.5         acetaminophen     Tablet .. 650 milliGRAM(s) Oral every 6 hours PRN  albuterol/ipratropium for Nebulization 3 milliLiter(s) Nebulizer every 6 hours  aspirin  chewable 81 milliGRAM(s) Oral daily  atorvastatin 80 milliGRAM(s) Oral at bedtime  buPROPion XL (24-Hour) . 150 milliGRAM(s) Oral daily  famotidine    Tablet 20 milliGRAM(s) Oral <User Schedule>  finasteride 5 milliGRAM(s) Oral every 24 hours  fluticasone propionate/ salmeterol 100-50 MICROgram(s) Diskus 1 Dose(s) Inhalation two times a day  heparin   Injectable 5000 Unit(s) SubCutaneous every 8 hours  loperamide 2 milliGRAM(s) Oral <User Schedule>  naphazoline/pheniramine Solution 2 Drop(s) Both EYES two times a day  ondansetron    Tablet 4 milliGRAM(s) Oral every 8 hours PRN  pancrelipase  (CREON 36,000 Lipase Units) 3 Capsule(s) Oral three times a day with meals  piperacillin/tazobactam IVPB.. 4.5 Gram(s) IV Intermittent every 8 hours  sirolimus 2 milliGRAM(s) Oral every 24 hours  sodium chloride 0.65% Nasal 1 Spray(s) Both Nostrils two times a day PRN  sodium chloride 3%  Inhalation 4 milliLiter(s) Inhalation every 12 hours  tamsulosin 0.4 milliGRAM(s) Oral every 24 hours  tiotropium 2.5 MICROgram(s) Inhaler 2 Puff(s) Inhalation daily

## 2024-12-07 LAB
ALBUMIN SERPL ELPH-MCNC: 2.6 G/DL — LOW (ref 3.3–5)
ALP SERPL-CCNC: 103 U/L — SIGNIFICANT CHANGE UP (ref 40–120)
ALT FLD-CCNC: 10 U/L — SIGNIFICANT CHANGE UP (ref 10–45)
ANION GAP SERPL CALC-SCNC: 13 MMOL/L — SIGNIFICANT CHANGE UP (ref 5–17)
AST SERPL-CCNC: 16 U/L — SIGNIFICANT CHANGE UP (ref 10–40)
BASOPHILS # BLD AUTO: 0.03 K/UL — SIGNIFICANT CHANGE UP (ref 0–0.2)
BASOPHILS NFR BLD AUTO: 0.5 % — SIGNIFICANT CHANGE UP (ref 0–2)
BILIRUB SERPL-MCNC: 0.3 MG/DL — SIGNIFICANT CHANGE UP (ref 0.2–1.2)
BUN SERPL-MCNC: 19 MG/DL — SIGNIFICANT CHANGE UP (ref 7–23)
CALCIUM SERPL-MCNC: 9.3 MG/DL — SIGNIFICANT CHANGE UP (ref 8.4–10.5)
CHLORIDE SERPL-SCNC: 104 MMOL/L — SIGNIFICANT CHANGE UP (ref 96–108)
CO2 SERPL-SCNC: 20 MMOL/L — LOW (ref 22–31)
CREAT SERPL-MCNC: 2.04 MG/DL — HIGH (ref 0.5–1.3)
EGFR: 33 ML/MIN/1.73M2 — LOW
EOSINOPHIL # BLD AUTO: 0.14 K/UL — SIGNIFICANT CHANGE UP (ref 0–0.5)
EOSINOPHIL NFR BLD AUTO: 2.3 % — SIGNIFICANT CHANGE UP (ref 0–6)
GLUCOSE SERPL-MCNC: 136 MG/DL — HIGH (ref 70–99)
HCT VFR BLD CALC: 38 % — LOW (ref 39–50)
HGB BLD-MCNC: 12.1 G/DL — LOW (ref 13–17)
IMM GRANULOCYTES NFR BLD AUTO: 0.7 % — SIGNIFICANT CHANGE UP (ref 0–0.9)
LYMPHOCYTES # BLD AUTO: 1.62 K/UL — SIGNIFICANT CHANGE UP (ref 1–3.3)
LYMPHOCYTES # BLD AUTO: 26.3 % — SIGNIFICANT CHANGE UP (ref 13–44)
MAGNESIUM SERPL-MCNC: 1.9 MG/DL — SIGNIFICANT CHANGE UP (ref 1.6–2.6)
MCHC RBC-ENTMCNC: 31.4 PG — SIGNIFICANT CHANGE UP (ref 27–34)
MCHC RBC-ENTMCNC: 31.8 G/DL — LOW (ref 32–36)
MCV RBC AUTO: 98.7 FL — SIGNIFICANT CHANGE UP (ref 80–100)
MONOCYTES # BLD AUTO: 0.55 K/UL — SIGNIFICANT CHANGE UP (ref 0–0.9)
MONOCYTES NFR BLD AUTO: 8.9 % — SIGNIFICANT CHANGE UP (ref 2–14)
MPO AB + PR3 PNL SER: SIGNIFICANT CHANGE UP
NEUTROPHILS # BLD AUTO: 3.77 K/UL — SIGNIFICANT CHANGE UP (ref 1.8–7.4)
NEUTROPHILS NFR BLD AUTO: 61.3 % — SIGNIFICANT CHANGE UP (ref 43–77)
NRBC # BLD: 0 /100 WBCS — SIGNIFICANT CHANGE UP (ref 0–0)
PHOSPHATE SERPL-MCNC: 2.7 MG/DL — SIGNIFICANT CHANGE UP (ref 2.5–4.5)
PLATELET # BLD AUTO: 195 K/UL — SIGNIFICANT CHANGE UP (ref 150–400)
POTASSIUM SERPL-MCNC: 4.2 MMOL/L — SIGNIFICANT CHANGE UP (ref 3.5–5.3)
POTASSIUM SERPL-SCNC: 4.2 MMOL/L — SIGNIFICANT CHANGE UP (ref 3.5–5.3)
PROT SERPL-MCNC: 6.1 G/DL — SIGNIFICANT CHANGE UP (ref 6–8.3)
RBC # BLD: 3.85 M/UL — LOW (ref 4.2–5.8)
RBC # FLD: 15.5 % — HIGH (ref 10.3–14.5)
SODIUM SERPL-SCNC: 137 MMOL/L — SIGNIFICANT CHANGE UP (ref 135–145)
TROPONIN T, HIGH SENSITIVITY RESULT: 104 NG/L — CRITICAL HIGH (ref 0–51)
WBC # BLD: 6.15 K/UL — SIGNIFICANT CHANGE UP (ref 3.8–10.5)
WBC # FLD AUTO: 6.15 K/UL — SIGNIFICANT CHANGE UP (ref 3.8–10.5)

## 2024-12-07 PROCEDURE — 70553 MRI BRAIN STEM W/O & W/DYE: CPT | Mod: 26

## 2024-12-07 PROCEDURE — 93010 ELECTROCARDIOGRAM REPORT: CPT

## 2024-12-07 PROCEDURE — 99233 SBSQ HOSP IP/OBS HIGH 50: CPT

## 2024-12-07 RX ORDER — ISOSORBIDE DINITRATE 40 MG/1
10 TABLET ORAL
Refills: 0 | Status: DISCONTINUED | OUTPATIENT
Start: 2024-12-07 | End: 2024-12-07

## 2024-12-07 RX ORDER — ISOSORBIDE DINITRATE 40 MG/1
10 TABLET ORAL THREE TIMES A DAY
Refills: 0 | Status: DISCONTINUED | OUTPATIENT
Start: 2024-12-07 | End: 2024-12-11

## 2024-12-07 RX ORDER — CARVEDILOL 25 MG/1
3.12 TABLET, FILM COATED ORAL EVERY 12 HOURS
Refills: 0 | Status: DISCONTINUED | OUTPATIENT
Start: 2024-12-07 | End: 2024-12-11

## 2024-12-07 RX ORDER — MAGNESIUM, ALUMINUM HYDROXIDE 200-225/5
30 SUSPENSION, ORAL (FINAL DOSE FORM) ORAL EVERY 4 HOURS
Refills: 0 | Status: DISCONTINUED | OUTPATIENT
Start: 2024-12-07 | End: 2024-12-13

## 2024-12-07 RX ORDER — HYDRALAZINE HYDROCHLORIDE 10 MG/1
25 TABLET ORAL EVERY 8 HOURS
Refills: 0 | Status: DISCONTINUED | OUTPATIENT
Start: 2024-12-07 | End: 2024-12-11

## 2024-12-07 RX ADMIN — Medication 3 CAPSULE(S): at 09:23

## 2024-12-07 RX ADMIN — ISOSORBIDE DINITRATE 10 MILLIGRAM(S): 40 TABLET ORAL at 13:34

## 2024-12-07 RX ADMIN — Medication 80 MILLIGRAM(S): at 22:56

## 2024-12-07 RX ADMIN — CARVEDILOL 3.12 MILLIGRAM(S): 25 TABLET, FILM COATED ORAL at 06:31

## 2024-12-07 RX ADMIN — FAMOTIDINE 20 MILLIGRAM(S): 20 TABLET, FILM COATED ORAL at 18:52

## 2024-12-07 RX ADMIN — Medication 5 MILLIGRAM(S): at 09:23

## 2024-12-07 RX ADMIN — Medication 2 DROP(S): at 06:40

## 2024-12-07 RX ADMIN — TAMSULOSIN HYDROCHLORIDE 0.4 MILLIGRAM(S): 0.4 CAPSULE ORAL at 18:53

## 2024-12-07 RX ADMIN — SODIUM CHLORIDE 4 MILLILITER(S): 9 INJECTION, SOLUTION INTRAMUSCULAR; INTRAVENOUS; SUBCUTANEOUS at 23:15

## 2024-12-07 RX ADMIN — SODIUM CHLORIDE 4 MILLILITER(S): 9 INJECTION, SOLUTION INTRAMUSCULAR; INTRAVENOUS; SUBCUTANEOUS at 00:48

## 2024-12-07 RX ADMIN — SODIUM CHLORIDE 4 MILLILITER(S): 9 INJECTION, SOLUTION INTRAMUSCULAR; INTRAVENOUS; SUBCUTANEOUS at 09:23

## 2024-12-07 RX ADMIN — FLUTICASONE PROPIONATE AND SALMETEROL XINAFOATE 1 DOSE(S): 45; 21 AEROSOL, METERED RESPIRATORY (INHALATION) at 19:18

## 2024-12-07 RX ADMIN — LOSARTAN POTASSIUM 50 MILLIGRAM(S): 100 TABLET, FILM COATED ORAL at 13:34

## 2024-12-07 RX ADMIN — FAMOTIDINE 20 MILLIGRAM(S): 20 TABLET, FILM COATED ORAL at 09:24

## 2024-12-07 RX ADMIN — CARVEDILOL 3.12 MILLIGRAM(S): 25 TABLET, FILM COATED ORAL at 18:53

## 2024-12-07 RX ADMIN — Medication 5000 UNIT(S): at 06:37

## 2024-12-07 RX ADMIN — PIPERACILLIN SODIUM AND TAZOBACTAM SODIUM 25 GRAM(S): 4; .5 INJECTION, POWDER, LYOPHILIZED, FOR SOLUTION INTRAVENOUS at 22:55

## 2024-12-07 RX ADMIN — HYDRALAZINE HYDROCHLORIDE 25 MILLIGRAM(S): 10 TABLET ORAL at 22:57

## 2024-12-07 RX ADMIN — HYDRALAZINE HYDROCHLORIDE 25 MILLIGRAM(S): 10 TABLET ORAL at 13:33

## 2024-12-07 RX ADMIN — Medication 2 MILLIGRAM(S): at 13:33

## 2024-12-07 RX ADMIN — Medication 5000 UNIT(S): at 22:56

## 2024-12-07 RX ADMIN — Medication 30 MILLILITER(S): at 13:33

## 2024-12-07 RX ADMIN — ISOSORBIDE DINITRATE 10 MILLIGRAM(S): 40 TABLET ORAL at 18:52

## 2024-12-07 RX ADMIN — PIPERACILLIN SODIUM AND TAZOBACTAM SODIUM 25 GRAM(S): 4; .5 INJECTION, POWDER, LYOPHILIZED, FOR SOLUTION INTRAVENOUS at 13:34

## 2024-12-07 RX ADMIN — Medication 2 DROP(S): at 18:54

## 2024-12-07 RX ADMIN — Medication 3 CAPSULE(S): at 13:32

## 2024-12-07 RX ADMIN — IPRATROPIUM BROMIDE AND ALBUTEROL SULFATE 3 MILLILITER(S): 2.5; .5 SOLUTION RESPIRATORY (INHALATION) at 00:47

## 2024-12-07 RX ADMIN — Medication 2 PUFF(S): at 13:32

## 2024-12-07 RX ADMIN — Medication 2 MILLIGRAM(S): at 06:31

## 2024-12-07 RX ADMIN — SIROLIMUS 2 MILLIGRAM(S): 1 TABLET ORAL at 06:41

## 2024-12-07 RX ADMIN — IPRATROPIUM BROMIDE AND ALBUTEROL SULFATE 3 MILLILITER(S): 2.5; .5 SOLUTION RESPIRATORY (INHALATION) at 22:56

## 2024-12-07 RX ADMIN — Medication 5000 UNIT(S): at 13:32

## 2024-12-07 RX ADMIN — ACETAMINOPHEN 500MG 650 MILLIGRAM(S): 500 TABLET, COATED ORAL at 10:22

## 2024-12-07 RX ADMIN — IPRATROPIUM BROMIDE AND ALBUTEROL SULFATE 3 MILLILITER(S): 2.5; .5 SOLUTION RESPIRATORY (INHALATION) at 18:53

## 2024-12-07 RX ADMIN — Medication 81 MILLIGRAM(S): at 13:34

## 2024-12-07 RX ADMIN — Medication 150 MILLIGRAM(S): at 13:33

## 2024-12-07 RX ADMIN — Medication 2 MILLIGRAM(S): at 22:56

## 2024-12-07 RX ADMIN — Medication 3 CAPSULE(S): at 18:52

## 2024-12-07 RX ADMIN — PIPERACILLIN SODIUM AND TAZOBACTAM SODIUM 25 GRAM(S): 4; .5 INJECTION, POWDER, LYOPHILIZED, FOR SOLUTION INTRAVENOUS at 06:36

## 2024-12-07 NOTE — PROGRESS NOTE ADULT - PROBLEM SELECTOR PLAN 6
- Treated for Mac for 8 weeks in summer 2023 but d/c'd given side effects mary anne with rifampin including diarrhea and orthostasis which are still ongoing.

## 2024-12-07 NOTE — PROGRESS NOTE ADULT - PROBLEM SELECTOR PLAN 9
Elevated Alk Phos to 148 on admission. Likely elevated in the setting of pancreatic insufficiency. Pt has chronic diarrhea.     [facility] my chart accessed on daughters phone  12/22/2023 MRI/MRCP abdomen w/ wo IV con: few pancreatic cystic lesions w/ indolent growth 1.6cm within pancreatic tail. no suspicious internal features, no main uct dilation. No solid mass  - C-Peptide- 10.4 (range 1.1-4.4)  - Fructosamine: 338 (0-285)  - low fecal elastatse 10/2023  - CMV+ 10/2023  - f/u GGT   - c/w home Creon   - c/w home loperamide

## 2024-12-07 NOTE — PROGRESS NOTE ADULT - PROBLEM SELECTOR PLAN 2
Na+ 118 on presentation. Normal sodium in early November. Serum osmolality 260, urine osmolality 283, urine sodium 57. Hypovolemic on physical exam: dry MM and decreased skin turgor.  Etiology: Hypovolemic hyponatremia most likely in setting of chronic diarrhea after NADIR rx  - Na 133 12/6

## 2024-12-07 NOTE — PROGRESS NOTE ADULT - PROBLEM SELECTOR PLAN 7
Patient with a hx of CKD, Hep C, Liver tx. Not currently getting dialysis. Baseline Cr 2.74 in early November.   UA- Protein>1000, RBC-26 represents a GN w/ nephritic range proteinuria    [facility] my chart accessed on daughters phone  - 11/21/2024 KFLC-272.2, LFLC- 28.4. K/L- 9.58, m-spike+, IgG-1089, iGa-211, iGm-72  - 11/21/2024  Uric acid-  6.7, LDH- 185  - 6/3/2024- Renal biopsy-                          - H&E- (1) segmental and global glomerulosclerosis, (2) diffuse global glomerulosclerosis (63%), w/ moderate to marked tubular atrophy & interstitial fibrosis w/ marked arterial and arteriolar sclerosis                         - LM - 16/24 glomeruli: globally sclerotic or show chronic hypoperfusion, 6/24: mild to moderate mesangial prominence, 2/24: segmental sclerosis, several bhavik show segmental "double contour" formation                         - IF - 4/6 globally sclerotic, Igm, Kappa, Lambda: trace granular mesagngial staining, IgG, IgA, C1q: no stain, 1+ C3- along tubular BM and in vessels, Casts- bitypic for both light chains. albumin neg, Fibrin neg                         - EM- 1/2 segmental sclerosis w/ subepithelial reparative changes as well as globular deposits consistent w/ hyaline w/ focal foot effacement.  GBM thickness mildly increaed, Subendothelial space:  no electron lucent expansion, No dense deposits. Mesangium: small electron dense deposits. Foci of mesangial interposition. No amyloid.   - 5/7/2024- iPTH- 112  - (3/2024) 24H Urine protein -1710, 75.2% paraprot excretion/24h  - 2/27/2024- 24H urine- 5-HIAA- 3.6 (WNL), Metanephrines- 35 (), normetanephrine- 122 (156-729), MPO neg, PR3 neg    - fu GN workup: Autoimmune (LAWANDA, ANCA, C3, C4, Cryoglobulin, anti-GBM ds, dsDNA), infection (HIV, Hep B, Hep C),   - continue to monitor  - avoid nephrotoxic agents

## 2024-12-07 NOTE — PROGRESS NOTE ADULT - PROBLEM SELECTOR PLAN 11
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

## 2024-12-07 NOTE — PROGRESS NOTE ADULT - PROBLEM SELECTOR PLAN 1
Hypertensive Urgency with Encephalopathy (IMPROVING)  Patient presented to ED with blood pressure 200/127. S/p nicardipine gtt and lasix 60mg IV in ED. Blood pressure lowered by presentation to floor to 149/92. No longer encephalopathic. Elevated blood pressure could be 2/2 pain w/ head strike. p/w fall w/ garbled speech w/ /140 in the field and 200/127 in ED, managed w/ cardine drip initially and currently on labetolol IV PRN. Plasma renin activity and aldosterone level WNL    [facility] my chart accessed on daughters phone  - aldosterone <1, plasma renin 0.511    - Reduce BP by 25% IN 24 hrs  - ctm vitals  - US Abdomen w/ doppler: NO SUKHJINDER,   - Plasma renin activity and aldosterone level WNL    Plan   -continue losartan 50mg QD and coreg 3.125mg BID  -will add hydral 25mg TID + isordil 10mg TID for better BP control  -avoid CCB due to HFrEF

## 2024-12-07 NOTE — PROGRESS NOTE ADULT - PROBLEM SELECTOR PLAN 10
Hemoglobin 11.8 on presentation. Could be 2/2 anemia of chronic disease or iron deficiency given CKD.   - continue to monitor

## 2024-12-07 NOTE — PROGRESS NOTE ADULT - PROBLEM SELECTOR PLAN 5
Presentation: iWOB, recent sick contact: wife (ROBI), BiPAP ---->[place]. Home meds: Advair, Spiriva, hypertonic saline. Does not use home O2.   CT showing b/l cavitary+solid lung lesion concerning for primary lung malignancy, RUL s/p wedge resection, cannot exclude TB or fungal etiology w/ small left pleural effusion and Diffuse interstitial pulmonary edema.    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

## 2024-12-07 NOTE — PROGRESS NOTE ADULT - PROBLEM SELECTOR PLAN 3
[facility] my chart accessed on daughters phone  - Myocardial SPECT-  LVEF 44%, EKG- SR, rare PVC (11/2024)  - Tc-99 PYP amyloid scan- negative for TTR amyloid (10/29/2024)  - US Heart- small pericardial effusion (9/5/2024)  - pRO-bnp-2080 (9/22/2024)    Patient with elevated BNP to 22,740. Patient wife reports hx of elevated BNP to 35k. Bedside POCUS showing an EF of ˜45%, no wall motion abnormalities, w/ dilated IVC on echo. Patient is s/p lasix 60mg IV in ED. Follows at [facility] cardiology.   - EF- 35%  - Strict Is&Os   - f/u formal TTE  - hold on additional diuresis for now   - GDMT: coreg 3.125mg BID, losartan 50mg QD, added hydral 25mg TID with isordil 10mg TID today

## 2024-12-07 NOTE — PROGRESS NOTE ADULT - ASSESSMENT
WANDER VAZQUEZ is a 79y year old Male with a PMHx significant for hepatitis C s/p liver transplant in 3/2019 for cryptogenic cirrhosis (on sirolimus for 5 years), unspecified autonomic nervous system disorder w/ hx of orthostasis, COPD, Mycobacterium avium complex lung disease, chronic anemia, amaurosis fugax, Crohn's disease, BPH, and unspecified pancreatic insufficiency. Patient presented to the ED on 12/2 after having fallen at home with a head-strike, without loss of consciousness. Found to be hyponatremic to 118 and found to have hypertensive urgency on presentation. Admitted for further medical management of blood pressure and hyponatremia.

## 2024-12-07 NOTE — PROGRESS NOTE ADULT - PROBLEM SELECTOR PLAN 12
Fluids: fluid restriction of < 1L  Electrolytes: replete as needed  Nutrition: Diet, soft and bite sized   PPI ppx: home famotidine 20mg BID  Dvt ppx: heparin 5000units subQ Q8H  Activity: fall precautions   Code Status: full code

## 2024-12-07 NOTE — PROGRESS NOTE ADULT - PROBLEM SELECTOR PLAN 4
Patient presented with an elevated troponin to 115. Tachycardic on presentation. EKG with no ischemic changes, Bedside POCUS showing no wall motion abnormalities. Elevated troponin in the setting of demand ischemia and CKD.    - trend troponin to peak

## 2024-12-07 NOTE — PROGRESS NOTE ADULT - SUBJECTIVE AND OBJECTIVE BOX
INTERVAL EVENTS: no acute events or complaints    PAST MEDICAL & SURGICAL HISTORY:  Chronic obstructive bronchitis    Other ascites    Cirrhosis    Spleen enlarged    Crohn disease    H/O squamous cell carcinoma    Carotid artery disease    H/O right hemicolectomy  Performed in 1976    S/P thoracotomy  removal of benign granuloma        MEDICATIONS  (STANDING):  albuterol/ipratropium for Nebulization 3 milliLiter(s) Nebulizer every 6 hours  aspirin  chewable 81 milliGRAM(s) Oral daily  atorvastatin 80 milliGRAM(s) Oral at bedtime  buPROPion XL (24-Hour) . 150 milliGRAM(s) Oral daily  carvedilol 3.125 milliGRAM(s) Oral every 12 hours  famotidine    Tablet 20 milliGRAM(s) Oral <User Schedule>  finasteride 5 milliGRAM(s) Oral every 24 hours  fluticasone propionate/ salmeterol 100-50 MICROgram(s) Diskus 1 Dose(s) Inhalation two times a day  heparin   Injectable 5000 Unit(s) SubCutaneous every 8 hours  hydrALAZINE 25 milliGRAM(s) Oral every 8 hours  isosorbide   dinitrate Tablet (ISORDIL) 10 milliGRAM(s) Oral three times a day  loperamide 2 milliGRAM(s) Oral three times a day  losartan 50 milliGRAM(s) Oral every 24 hours  naphazoline/pheniramine Solution 2 Drop(s) Both EYES two times a day  pancrelipase  (CREON 36,000 Lipase Units) 3 Capsule(s) Oral three times a day with meals  piperacillin/tazobactam IVPB.. 4.5 Gram(s) IV Intermittent every 8 hours  sodium chloride 3%  Inhalation 4 milliLiter(s) Inhalation every 12 hours  tamsulosin 0.4 milliGRAM(s) Oral every 24 hours  tiotropium 2.5 MICROgram(s) Inhaler 2 Puff(s) Inhalation daily    MEDICATIONS  (PRN):  acetaminophen     Tablet .. 650 milliGRAM(s) Oral every 6 hours PRN Mild Pain (1 - 3), Moderate Pain (4 - 6)  aluminum hydroxide/magnesium hydroxide/simethicone Suspension 30 milliLiter(s) Oral every 4 hours PRN Dyspepsia  ondansetron    Tablet 4 milliGRAM(s) Oral every 8 hours PRN Nausea and/or Vomiting  sodium chloride 0.65% Nasal 1 Spray(s) Both Nostrils two times a day PRN Nasal Congestion    T(F): 98.1 (12-07-24 @ 06:03), Max: 98.1 (12-07-24 @ 06:03)  HR: 90 (12-07-24 @ 06:03) (90 - 105)  BP: 161/98 (12-07-24 @ 06:03) (161/98 - 176/113)  BP(mean): --  ABP: --  ABP(mean): --  RR: 18 (12-07-24 @ 06:03) (18 - 18)  SpO2: 94% (12-07-24 @ 06:03) (94% - 97%)  CVP(mm Hg): --    I/O Detail 24H    06 Dec 2024 07:01  -  07 Dec 2024 07:00  --------------------------------------------------------  IN:  Total IN: 0 mL    OUT:    Indwelling Catheter - Urethral (mL): 720 mL  Total OUT: 720 mL    Total NET: -720 mL          PHYSICAL EXAM:  Constitutional: resting comfortably in bed; NAD  HEENT: NC/AT, PERRL, EOMI, anicteric sclera, MMM  Neck: supple  Respiratory: +rhonchi in all fields   Cardiac: +S1/S2; RRR; no M/R/G  Gastrointestinal: soft, NT/ND; no rebound or guarding; +BSx4  Genitourinary: normal external genitalia, +Dias   Extremities: WWP, no clubbing or cyanosis; no peripheral edema  Musculoskeletal: +swelling of L elbow   Vascular: 2+ radial, DP/PT pulses B/L  Neurologic: AAOx3; CNII-XII grossly intact; no focal deficits    LABS:  CBC 12-07-24 @ 12:42                        12.1   6.15  )-----------( 195                   38.0     Hgb trend: 12.1 <-- , 10.6 <-- , 11.2 <--   WBC trend: 6.15 <-- , 5.26 <-- , 7.18 <--       CMP 12-06-24 @ 06:14    133[L]  |  103  |  21  ----------------------------<  86  3.7   |  19[L]  |  2.23[H]    Phos  3.2     12-06  Mg     2.0     12-06    TPro  5.6[L]  /  Alb  2.4[L]  /  TBili  0.3  /  DBili  x   /  AST  14  /  ALT  9[L]  /  AlkPhos  96     12-06    Serum Cr (eGFR) trend: 2.23 (29) <-- , 2.22 (30) <-- , 2.29 (28) <-- , 2.29 (28) <--           Cardiac Markers         STUDIES:

## 2024-12-07 NOTE — PROGRESS NOTE ADULT - PROBLEM SELECTOR PLAN 8
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 Home sirolimus 2mg QD.   - c/w sirolimus 2mg QD  -f/u level today  -hepatology following

## 2024-12-08 LAB
CULTURE RESULTS: SIGNIFICANT CHANGE UP
CULTURE RESULTS: SIGNIFICANT CHANGE UP
SPECIMEN SOURCE: SIGNIFICANT CHANGE UP
SPECIMEN SOURCE: SIGNIFICANT CHANGE UP

## 2024-12-08 PROCEDURE — 70450 CT HEAD/BRAIN W/O DYE: CPT | Mod: 26

## 2024-12-08 PROCEDURE — 99233 SBSQ HOSP IP/OBS HIGH 50: CPT

## 2024-12-08 RX ORDER — LOSARTAN POTASSIUM 100 MG/1
50 TABLET, FILM COATED ORAL ONCE
Refills: 0 | Status: COMPLETED | OUTPATIENT
Start: 2024-12-08 | End: 2024-12-08

## 2024-12-08 RX ORDER — POTASSIUM CHLORIDE 600 MG/1
40 TABLET, EXTENDED RELEASE ORAL ONCE
Refills: 0 | Status: COMPLETED | OUTPATIENT
Start: 2024-12-08 | End: 2024-12-08

## 2024-12-08 RX ORDER — LOSARTAN POTASSIUM 100 MG/1
100 TABLET, FILM COATED ORAL EVERY 24 HOURS
Refills: 0 | Status: DISCONTINUED | OUTPATIENT
Start: 2024-12-09 | End: 2024-12-13

## 2024-12-08 RX ADMIN — TAMSULOSIN HYDROCHLORIDE 0.4 MILLIGRAM(S): 0.4 CAPSULE ORAL at 19:48

## 2024-12-08 RX ADMIN — Medication 3 CAPSULE(S): at 19:53

## 2024-12-08 RX ADMIN — FLUTICASONE PROPIONATE AND SALMETEROL XINAFOATE 1 DOSE(S): 45; 21 AEROSOL, METERED RESPIRATORY (INHALATION) at 18:35

## 2024-12-08 RX ADMIN — PIPERACILLIN SODIUM AND TAZOBACTAM SODIUM 25 GRAM(S): 4; .5 INJECTION, POWDER, LYOPHILIZED, FOR SOLUTION INTRAVENOUS at 13:26

## 2024-12-08 RX ADMIN — Medication 5 MILLIGRAM(S): at 10:02

## 2024-12-08 RX ADMIN — ACETAMINOPHEN 500MG 650 MILLIGRAM(S): 500 TABLET, COATED ORAL at 07:30

## 2024-12-08 RX ADMIN — LOSARTAN POTASSIUM 50 MILLIGRAM(S): 100 TABLET, FILM COATED ORAL at 13:55

## 2024-12-08 RX ADMIN — Medication 2 PUFF(S): at 12:34

## 2024-12-08 RX ADMIN — PIPERACILLIN SODIUM AND TAZOBACTAM SODIUM 25 GRAM(S): 4; .5 INJECTION, POWDER, LYOPHILIZED, FOR SOLUTION INTRAVENOUS at 07:30

## 2024-12-08 RX ADMIN — HYDRALAZINE HYDROCHLORIDE 25 MILLIGRAM(S): 10 TABLET ORAL at 22:14

## 2024-12-08 RX ADMIN — HYDRALAZINE HYDROCHLORIDE 25 MILLIGRAM(S): 10 TABLET ORAL at 07:30

## 2024-12-08 RX ADMIN — IPRATROPIUM BROMIDE AND ALBUTEROL SULFATE 3 MILLILITER(S): 2.5; .5 SOLUTION RESPIRATORY (INHALATION) at 07:29

## 2024-12-08 RX ADMIN — Medication 3 CAPSULE(S): at 13:52

## 2024-12-08 RX ADMIN — POTASSIUM CHLORIDE 40 MILLIEQUIVALENT(S): 600 TABLET, EXTENDED RELEASE ORAL at 19:48

## 2024-12-08 RX ADMIN — Medication 5000 UNIT(S): at 13:54

## 2024-12-08 RX ADMIN — Medication 80 MILLIGRAM(S): at 22:14

## 2024-12-08 RX ADMIN — ACETAMINOPHEN 500MG 650 MILLIGRAM(S): 500 TABLET, COATED ORAL at 19:49

## 2024-12-08 RX ADMIN — Medication 5000 UNIT(S): at 07:30

## 2024-12-08 RX ADMIN — Medication 5000 UNIT(S): at 22:15

## 2024-12-08 RX ADMIN — SODIUM CHLORIDE 4 MILLILITER(S): 9 INJECTION, SOLUTION INTRAMUSCULAR; INTRAVENOUS; SUBCUTANEOUS at 22:14

## 2024-12-08 RX ADMIN — Medication 3 CAPSULE(S): at 10:02

## 2024-12-08 RX ADMIN — Medication 81 MILLIGRAM(S): at 13:55

## 2024-12-08 RX ADMIN — IPRATROPIUM BROMIDE AND ALBUTEROL SULFATE 3 MILLILITER(S): 2.5; .5 SOLUTION RESPIRATORY (INHALATION) at 19:48

## 2024-12-08 RX ADMIN — FAMOTIDINE 20 MILLIGRAM(S): 20 TABLET, FILM COATED ORAL at 10:01

## 2024-12-08 RX ADMIN — ISOSORBIDE DINITRATE 10 MILLIGRAM(S): 40 TABLET ORAL at 07:31

## 2024-12-08 RX ADMIN — LOSARTAN POTASSIUM 50 MILLIGRAM(S): 100 TABLET, FILM COATED ORAL at 19:48

## 2024-12-08 RX ADMIN — IPRATROPIUM BROMIDE AND ALBUTEROL SULFATE 3 MILLILITER(S): 2.5; .5 SOLUTION RESPIRATORY (INHALATION) at 13:52

## 2024-12-08 RX ADMIN — ISOSORBIDE DINITRATE 10 MILLIGRAM(S): 40 TABLET ORAL at 17:21

## 2024-12-08 RX ADMIN — Medication 2 DROP(S): at 07:31

## 2024-12-08 RX ADMIN — Medication 2 MILLIGRAM(S): at 07:30

## 2024-12-08 RX ADMIN — ISOSORBIDE DINITRATE 10 MILLIGRAM(S): 40 TABLET ORAL at 22:14

## 2024-12-08 RX ADMIN — CARVEDILOL 3.12 MILLIGRAM(S): 25 TABLET, FILM COATED ORAL at 07:31

## 2024-12-08 RX ADMIN — HYDRALAZINE HYDROCHLORIDE 25 MILLIGRAM(S): 10 TABLET ORAL at 13:55

## 2024-12-08 RX ADMIN — CARVEDILOL 3.12 MILLIGRAM(S): 25 TABLET, FILM COATED ORAL at 19:48

## 2024-12-08 RX ADMIN — Medication 2 DROP(S): at 19:53

## 2024-12-08 RX ADMIN — PIPERACILLIN SODIUM AND TAZOBACTAM SODIUM 25 GRAM(S): 4; .5 INJECTION, POWDER, LYOPHILIZED, FOR SOLUTION INTRAVENOUS at 22:14

## 2024-12-08 RX ADMIN — FAMOTIDINE 20 MILLIGRAM(S): 20 TABLET, FILM COATED ORAL at 19:49

## 2024-12-08 RX ADMIN — Medication 100 GRAM(S): at 17:01

## 2024-12-08 RX ADMIN — Medication 2 MILLIGRAM(S): at 22:14

## 2024-12-08 RX ADMIN — Medication 2 MILLIGRAM(S): at 13:54

## 2024-12-08 RX ADMIN — Medication 150 MILLIGRAM(S): at 13:55

## 2024-12-08 RX ADMIN — SODIUM CHLORIDE 4 MILLILITER(S): 9 INJECTION, SOLUTION INTRAMUSCULAR; INTRAVENOUS; SUBCUTANEOUS at 09:34

## 2024-12-08 NOTE — PROGRESS NOTE ADULT - ASSESSMENT
79y Male with PMHx of COPD, CKD stage 4, Crohn's disease hx of liver transplant presents to [facility] ED for fall and hypertension. NIHSS 4, notable for LUE/LLE dysmetria. CT imaging unremarkable. Labs with metabolic derangements (hyponatremic to 118, hypokalemic to 2.9). Carotid U/S showed: Mild atheromatous plaque bilaterally without evidence of hemodynamically significant stenosis (less than 50%)       79 year old male s/p liver transplant, autonomic NS dysfunction, MAC, and Crohn’s disease admitted after fall with head trauma after which he was altered with ?garbled speech, ?  facial droop in s/o severe hyponatremia/hypokalemia. HCT, CTA/P negative apart rom L>R ICA carotid plaque w/o significant stenosis.  Given focal findings on effort-limited exam, rec MRI brain which was negative for stroke. Team notified stroke provider today of waxing and waning dysarthria. Per the patient he has been waking up everyday since the fall with worsening dysarthria which then improves throughout the day. Given negative MRI and persistent sx would consult general neurology for dysarthria. Stroke to sign off.     Discussed with Dr. Rossi

## 2024-12-08 NOTE — PROGRESS NOTE ADULT - PROBLEM SELECTOR PLAN 3
[facility] my chart accessed on daughters phone  - Myocardial SPECT-  LVEF 44%, EKG- SR, rare PVC (11/2024)  - Tc-99 PYP amyloid scan- negative for TTR amyloid (10/29/2024)  - US Heart- small pericardial effusion (9/5/2024)  - pRO-[id number]-2080 (9/22/2024)    Patient with elevated BNP to 22,740. Patient wife reports hx of elevated BNP to 35k. Bedside POCUS showing an EF of ˜45%, no wall motion abnormalities, w/ dilated IVC on echo. Patient is s/p lasix 60mg IV in ED. Follows at [facility] cardiology.   - EF- 35%  - Strict Is&Os   - f/u formal TTE  - hold on additional diuresis for now

## 2024-12-08 NOTE — PROGRESS NOTE ADULT - PROBLEM SELECTOR PLAN 8
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 Home sirolimus 2mg QD.   - c/w sirolimus 2mg QD  -f/u level tomorrow AM   -hepatology following

## 2024-12-08 NOTE — PROGRESS NOTE ADULT - PROBLEM SELECTOR PLAN 5
Presentation: iWOB, recent sick contact: wife (ROBI), BiPAP ---->NC. Home meds: Advair, Spiriva, hypertonic saline. Does not use home O2.   CT showing b/l cavitary+solid lung lesion concerning for primary lung malignancy, RUL s/p wedge resection, cannot exclude TB or fungal etiology w/ small left pleural effusion and Diffuse interstitial pulmonary edema.    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

## 2024-12-08 NOTE — PROGRESS NOTE ADULT - PROBLEM SELECTOR PLAN 9
Elevated Alk Phos to 148 on admission. Likely elevated in the setting of pancreatic insufficiency. Pt has chronic diarrhea.     [facility] my chart accessed on daughters phone  12/22/2023 MRI/MRCP abdomen w/ wo IV con: few pancreatic cystic lesions w/ indolent growth 1.6cm within pancreatic tail. no suspicious internal features, no main uct dilation. No solid mass  - C-Peptide- 10.4 (range 1.1-4.4)  - Fructosamine: 338 (0-285)  - low fecal elastatse 10/2023  - CMV+ 10/2023  - f/u GGT   - c/w home Creon   - c/w home loperamide

## 2024-12-08 NOTE — PROGRESS NOTE ADULT - PROBLEM SELECTOR PLAN 7
Patient with a hx of CKD, Hep C, Liver tx. Not currently getting dialysis. Baseline Cr 2.74 in early November.   UA- Protein>1000, RBC-26 represents a GN w/ nephritic range proteinuria    [facility] my chart accessed on daughters phone  - 11/21/2024 KFLC-272.2, LFLC- 28.4. K/L- 9.58, m-spike+, IgG-1089, iGa-211, iGm-72  - 11/21/2024  Uric acid-  6.7, LDH- 185  - 6/3/2024- Renal biopsy-                          - H&E- (1) segmental and global glomerulosclerosis, (2) diffuse global glomerulosclerosis (63%), w/ moderate to marked tubular atrophy & interstitial fibrosis w/ marked arterial and arteriolar sclerosis                         - LM - 16/24 glomeruli: globally sclerotic or show chronic hypoperfusion, 6/24: mild to moderate mesangial prominence, 2/24: segmental sclerosis, several bhavik show segmental "double contour" formation                         - IF - 4/6 globally sclerotic, Igm, Kappa, Lambda: trace granular mesagngial staining, IgG, IgA, C1q: no stain, 1+ C3- along tubular BM and in vessels, Casts- bitypic for both light chains. albumin neg, Fibrin neg                         - EM- 1/2 segmental sclerosis w/ subepithelial reparative changes as well as globular deposits consistent w/ hyaline w/ focal foot effacement.  GBM thickness mildly increaed, Subendothelial space:  no electron lucent expansion, No dense deposits. Mesangium: small electron dense deposits. Foci of mesangial interposition. No amyloid.   - 5/7/2024- iPTH- 112  - (3/2024) 24H Urine protein -1710, 75.2% paraprot excretion/24h  - 2/27/2024- 24H urine- 5-HIAA- 3.6 (WNL), Metanephrines- 35 (), normetanephrine- 122 (156-729), MPO neg, PR3 neg    - fu GN workup: Autoimmune (LAWANDA, ANCA, C3, C4, Cryoglobulin, anti-GBM ds, dsDNA), infection (HIV, Hep B, Hep C),   - continue to monitor  - avoid nephrotoxic agents

## 2024-12-08 NOTE — PROGRESS NOTE ADULT - PROBLEM SELECTOR PLAN 2
Na+ 118 on presentation. Normal sodium in early November. Serum osmolality 260, urine osmolality 283, urine sodium 57. Hypovolemic on physical exam: dry MM and decreased skin turgor.  Etiology: Hypovolemic hyponatremia most likely in setting of chronic diarrhea after NADIR rx  - Na 133 12/6  - Hypertonic saline w/ goal Na increase of 6-8meq over 24h  - continue to monitor w/ Q4H BMP and q12h urine studies  - fu nephrology recs  -TSH and AM cortisol WNL  - strict I and O Resolved  Na+ 118 on presentation. Normal sodium in early November. Serum osmolality 260, urine osmolality 283, urine sodium 57. Hypovolemic on physical exam: dry MM and decreased skin turgor.  Etiology: Hypovolemic hyponatremia most likely in setting of chronic diarrhea after NADIR rx  - Na 133 12/6. 135 12/8  - Hypertonic saline w/ goal Na increase of 6-8meq over 24h  - continue to monitor w/ Q4H BMP and q12h urine studies  - fu nephrology recs  -TSH and AM cortisol WNL  - strict I and O

## 2024-12-08 NOTE — PROGRESS NOTE ADULT - PROBLEM SELECTOR PLAN 11
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

## 2024-12-08 NOTE — PROGRESS NOTE ADULT - SUBJECTIVE AND OBJECTIVE BOX
Neurology Stroke Progress Note    INTERVAL HPI/OVERNIGHT EVENTS:  Patient seen and examined. Team notified stroke provider of waxing and waning dysarthria. Per the patient he wakes up everyday since the fall with worsening dysarthria which then improves throughout the day. MRI wwo obtained which did not reveal acute stroke.    MEDICATIONS  (STANDING):  albuterol/ipratropium for Nebulization 3 milliLiter(s) Nebulizer every 6 hours  aspirin  chewable 81 milliGRAM(s) Oral daily  atorvastatin 80 milliGRAM(s) Oral at bedtime  buPROPion XL (24-Hour) . 150 milliGRAM(s) Oral daily  carvedilol 3.125 milliGRAM(s) Oral every 12 hours  famotidine    Tablet 20 milliGRAM(s) Oral <User Schedule>  finasteride 5 milliGRAM(s) Oral every 24 hours  fluticasone propionate/ salmeterol 100-50 MICROgram(s) Diskus 1 Dose(s) Inhalation two times a day  heparin   Injectable 5000 Unit(s) SubCutaneous every 8 hours  hydrALAZINE 25 milliGRAM(s) Oral every 8 hours  isosorbide   dinitrate Tablet (ISORDIL) 10 milliGRAM(s) Oral three times a day  loperamide 2 milliGRAM(s) Oral three times a day  losartan 50 milliGRAM(s) Oral every 24 hours  magnesium sulfate  IVPB 1 Gram(s) IV Intermittent once  naphazoline/pheniramine Solution 2 Drop(s) Both EYES two times a day  pancrelipase  (CREON 36,000 Lipase Units) 3 Capsule(s) Oral three times a day with meals  piperacillin/tazobactam IVPB.. 4.5 Gram(s) IV Intermittent every 8 hours  sodium chloride 3%  Inhalation 4 milliLiter(s) Inhalation every 12 hours  tamsulosin 0.4 milliGRAM(s) Oral every 24 hours  tiotropium 2.5 MICROgram(s) Inhaler 2 Puff(s) Inhalation daily    MEDICATIONS  (PRN):  acetaminophen     Tablet .. 650 milliGRAM(s) Oral every 6 hours PRN Mild Pain (1 - 3), Moderate Pain (4 - 6)  aluminum hydroxide/magnesium hydroxide/simethicone Suspension 30 milliLiter(s) Oral every 4 hours PRN Dyspepsia  ondansetron    Tablet 4 milliGRAM(s) Oral every 8 hours PRN Nausea and/or Vomiting  sodium chloride 0.65% Nasal 1 Spray(s) Both Nostrils two times a day PRN Nasal Congestion      Allergies    NSAIDs (Pruritus)  Aleve (Unknown)    Intolerances        Vital Signs Last 24 Hrs  T(C): 36.9 (08 Dec 2024 08:30), Max: 36.9 (08 Dec 2024 08:30)  T(F): 98.5 (08 Dec 2024 08:30), Max: 98.5 (08 Dec 2024 08:30)  HR: 90 (08 Dec 2024 08:30) (89 - 90)  BP: 155/100 (08 Dec 2024 08:30) (152/103 - 167/96)  BP(mean): --  RR: 19 (08 Dec 2024 08:30) (17 - 19)  SpO2: 97% (08 Dec 2024 08:30) (94% - 97%)    Parameters below as of 08 Dec 2024 08:30  Patient On (Oxygen Delivery Method): room air      Neurologic:  -Mental status: Awake, alert, oriented to person, place, and time. Speech is fluent with intact naming, repetition, and comprehension, moderate dysarthria. Hypophonic voice. Recent and remote memory intact. Follows simple and complex commands. Attention/concentration intact.  -Cranial nerves:   II: Visual fields are full to confrontation.  III, IV, VI: Extraocular movements are intact without nystagmus. Pupils equally round and reactive to light  V:  Facial sensation V1-V3 equal and intact   VII: L sided facial asymmetry  VIII: Hearing is grossly intact to voice  XI: Head turning and shoulder shrug are intact.  XII: Tongue protrudes midline  Motor: Normal bulk and tone. No pronator drift. Strength bilateral UE 5/5, bilateral lower extremities 5/5.  Sensation: Intact to light touch bilaterally. No neglect or extinction on double simultaneous testing.  Coordination: no dysmetria noted    LABS:                        10.4   6.76  )-----------( 205      ( 08 Dec 2024 10:00 )             32.3     12-08    135  |  102  |  20  ----------------------------<  123[H]  3.7   |  25  |  2.25[H]    Ca    9.0      08 Dec 2024 10:00  Phos  2.8     12-08  Mg     1.8     12-08    TPro  5.7[L]  /  Alb  2.9[L]  /  TBili  0.4  /  DBili  x   /  AST  12  /  ALT  9[L]  /  AlkPhos  92  12-08      Urinalysis Basic - ( 08 Dec 2024 10:00 )    Color: x / Appearance: x / SG: x / pH: x  Gluc: 123 mg/dL / Ketone: x  / Bili: x / Urobili: x   Blood: x / Protein: x / Nitrite: x   Leuk Esterase: x / RBC: x / WBC x   Sq Epi: x / Non Sq Epi: x / Bacteria: x        RADIOLOGY & ADDITIONAL TESTS:  < from: MR Head w/wo IV Cont (12.07.24 @ 14:32) >  IMPRESSION:  No intracranial metastasis.    Incidental subcentimeter cystic lesion in the pituitary gland. Correlate   clinically with pituitary enzymes.    No acute intracranial hemorrhage or acute infarct    < end of copied text >     Neurology Stroke Progress Note    INTERVAL HPI/OVERNIGHT EVENTS:  Patient seen and examined. Team notified stroke provider of waxing and waning dysarthria. Per the patient he has been waking up everyday since the fall with worsening dysarthria which then improves throughout the day. MRI wwo obtained which did not reveal acute stroke.    MEDICATIONS  (STANDING):  albuterol/ipratropium for Nebulization 3 milliLiter(s) Nebulizer every 6 hours  aspirin  chewable 81 milliGRAM(s) Oral daily  atorvastatin 80 milliGRAM(s) Oral at bedtime  buPROPion XL (24-Hour) . 150 milliGRAM(s) Oral daily  carvedilol 3.125 milliGRAM(s) Oral every 12 hours  famotidine    Tablet 20 milliGRAM(s) Oral <User Schedule>  finasteride 5 milliGRAM(s) Oral every 24 hours  fluticasone propionate/ salmeterol 100-50 MICROgram(s) Diskus 1 Dose(s) Inhalation two times a day  heparin   Injectable 5000 Unit(s) SubCutaneous every 8 hours  hydrALAZINE 25 milliGRAM(s) Oral every 8 hours  isosorbide   dinitrate Tablet (ISORDIL) 10 milliGRAM(s) Oral three times a day  loperamide 2 milliGRAM(s) Oral three times a day  losartan 50 milliGRAM(s) Oral every 24 hours  magnesium sulfate  IVPB 1 Gram(s) IV Intermittent once  naphazoline/pheniramine Solution 2 Drop(s) Both EYES two times a day  pancrelipase  (CREON 36,000 Lipase Units) 3 Capsule(s) Oral three times a day with meals  piperacillin/tazobactam IVPB.. 4.5 Gram(s) IV Intermittent every 8 hours  sodium chloride 3%  Inhalation 4 milliLiter(s) Inhalation every 12 hours  tamsulosin 0.4 milliGRAM(s) Oral every 24 hours  tiotropium 2.5 MICROgram(s) Inhaler 2 Puff(s) Inhalation daily    MEDICATIONS  (PRN):  acetaminophen     Tablet .. 650 milliGRAM(s) Oral every 6 hours PRN Mild Pain (1 - 3), Moderate Pain (4 - 6)  aluminum hydroxide/magnesium hydroxide/simethicone Suspension 30 milliLiter(s) Oral every 4 hours PRN Dyspepsia  ondansetron    Tablet 4 milliGRAM(s) Oral every 8 hours PRN Nausea and/or Vomiting  sodium chloride 0.65% Nasal 1 Spray(s) Both Nostrils two times a day PRN Nasal Congestion      Allergies    NSAIDs (Pruritus)  Aleve (Unknown)    Intolerances        Vital Signs Last 24 Hrs  T(C): 36.9 (08 Dec 2024 08:30), Max: 36.9 (08 Dec 2024 08:30)  T(F): 98.5 (08 Dec 2024 08:30), Max: 98.5 (08 Dec 2024 08:30)  HR: 90 (08 Dec 2024 08:30) (89 - 90)  BP: 155/100 (08 Dec 2024 08:30) (152/103 - 167/96)  BP(mean): --  RR: 19 (08 Dec 2024 08:30) (17 - 19)  SpO2: 97% (08 Dec 2024 08:30) (94% - 97%)    Parameters below as of 08 Dec 2024 08:30  Patient On (Oxygen Delivery Method): room air      Neurologic:  -Mental status: Awake, alert, oriented to person, place, and time. Speech is fluent with intact naming, repetition, and comprehension, moderate dysarthria. Hypophonic voice. Recent and remote memory intact. Follows simple and complex commands. Attention/concentration intact.  -Cranial nerves:   II: Visual fields are full to confrontation.  III, IV, VI: Extraocular movements are intact without nystagmus. Pupils equally round and reactive to light  V:  Facial sensation V1-V3 equal and intact   VII: L sided facial asymmetry  VIII: Hearing is grossly intact to voice  XI: Head turning and shoulder shrug are intact.  XII: Tongue protrudes midline  Motor: Normal bulk and tone. No pronator drift. Strength bilateral UE 5/5, bilateral lower extremities 5/5.  Sensation: Intact to light touch bilaterally. No neglect or extinction on double simultaneous testing.  Coordination: no dysmetria noted    LABS:                        10.4   6.76  )-----------( 205      ( 08 Dec 2024 10:00 )             32.3     12-08    135  |  102  |  20  ----------------------------<  123[H]  3.7   |  25  |  2.25[H]    Ca    9.0      08 Dec 2024 10:00  Phos  2.8     12-08  Mg     1.8     12-08    TPro  5.7[L]  /  Alb  2.9[L]  /  TBili  0.4  /  DBili  x   /  AST  12  /  ALT  9[L]  /  AlkPhos  92  12-08      Urinalysis Basic - ( 08 Dec 2024 10:00 )    Color: x / Appearance: x / SG: x / pH: x  Gluc: 123 mg/dL / Ketone: x  / Bili: x / Urobili: x   Blood: x / Protein: x / Nitrite: x   Leuk Esterase: x / RBC: x / WBC x   Sq Epi: x / Non Sq Epi: x / Bacteria: x        RADIOLOGY & ADDITIONAL TESTS:  < from: MR Head w/wo IV Cont (12.07.24 @ 14:32) >  IMPRESSION:  No intracranial metastasis.    Incidental subcentimeter cystic lesion in the pituitary gland. Correlate   clinically with pituitary enzymes.    No acute intracranial hemorrhage or acute infarct    < end of copied text >

## 2024-12-08 NOTE — PROGRESS NOTE ADULT - PROBLEM SELECTOR PLAN 1
Hypertensive Urgency with Encephalopathy (IMPROVING)  Patient presented to ED with blood pressure 200/127. S/p nicardipine gtt and lasix 60mg IV in ED. Blood pressure lowered by presentation to floor to 149/92. No longer encephalopathic. Elevated blood pressure could be 2/2 pain w/ head strike. p/w fall w/ garbled speech w/ /140 in the field and 200/127 in ED, managed w/ cardine drip initially and currently on labetolol IV PRN. Plasma renin activity and aldosterone level WNL    [facility] my chart accessed on daughters phone  - aldosterone <1, plasma renin 0.511    - Reduce BP by 25% IN 24 hrs  - ctm vitals  - US Abdomen w/ doppler: NO SUKHJINDER,   - Plasma renin activity and aldosterone level WNL    Plan   - continue to monitor blood pressure   -increased losartan to 50mg QD  -avoid CCB due to HFrEF Hypertensive Urgency with Encephalopathy (IMPROVING)  Patient presented to ED with blood pressure 200/127. S/p nicardipine gtt and lasix 60mg IV in ED. Blood pressure lowered by presentation to floor to 149/92. No longer encephalopathic. Elevated blood pressure could be 2/2 pain w/ head strike. p/w fall w/ garbled speech w/ /140 in the field and 200/127 in ED, managed w/ cardine drip initially and currently on labetolol IV PRN. Plasma renin activity and aldosterone level WNL    [facility] my chart accessed on daughters phone  - aldosterone <1, plasma renin 0.511    - Reduce BP by 25% IN 24 hrs  - ctm vitals  - US Abdomen w/ doppler: NO SUKHJINDER,   - Plasma renin activity and aldosterone level WNL    Plan   - continue to monitor blood pressure   -increased losartan to 100mg QD  -avoid CCB due to HFrEF

## 2024-12-08 NOTE — PROGRESS NOTE ADULT - SUBJECTIVE AND OBJECTIVE BOX
INTERVAL HPI/OVERNIGHT EVENTS:  Patient was seen and examined at bedside. As per nurse and patient, no o/n events, patient resting comfortably. No complaints at this time. Patient denies: fever, chills, dizziness, weakness, HA, Changes in vision, CP, palpitations, SOB, cough, N/V/D/C, dysuria, changes in bowel movements, LE edema. ROS otherwise negative.    VITAL SIGNS:  T(F): 97.7 (12-08-24 @ 05:22)  HR: 89 (12-08-24 @ 05:22)  BP: 167/96 (12-08-24 @ 05:22)  RR: 17 (12-08-24 @ 05:22)  SpO2: 94% (12-08-24 @ 05:22)  Wt(kg): --      12-07-24 @ 07:01  -  12-08-24 @ 07:00  --------------------------------------------------------  IN: 0 mL / OUT: 460 mL / NET: -460 mL    12-08-24 @ 07:01  -  12-08-24 @ 07:30  --------------------------------------------------------  IN: 0 mL / OUT: 100 mL / NET: -100 mL        PHYSICAL EXAM:    Constitutional: resting comfortably in bed; NAD  HEENT: NC/AT, PERRL, EOMI, anicteric sclera, no nasal discharge; uvula midline, no oropharyngeal erythema or exudates; MMM  Neck: supple; no JVD or thyromegaly  Respiratory: CTA B/L; no W/R/R, no retractions  Cardiac: +S1/S2; RRR; no M/R/G; PMI non-displaced  Gastrointestinal: soft, NT/ND; no rebound or guarding; +BSx4  Genitourinary: normal external genitalia  Back: spine midline, no bony tenderness or step-offs; no CVAT B/L  Extremities: WWP, no clubbing or cyanosis; no peripheral edema  Musculoskeletal: NROM x4; no joint swelling, tenderness or erythema  Vascular: 2+ radial, DP/PT pulses B/L  Dermatologic: skin warm, dry and intact; no rashes, wounds, or scars  Lymphatic: no submandibular or cervical LAD  Neurologic: AAOx3; CNII-XII grossly intact; no focal deficits  Psychiatric: affect and characteristics of appearance, verbalizations, behaviors are appropriate, denies SI/HI/AH/VH    MEDICATIONS  (STANDING):  albuterol/ipratropium for Nebulization 3 milliLiter(s) Nebulizer every 6 hours  aspirin  chewable 81 milliGRAM(s) Oral daily  atorvastatin 80 milliGRAM(s) Oral at bedtime  buPROPion XL (24-Hour) . 150 milliGRAM(s) Oral daily  carvedilol 3.125 milliGRAM(s) Oral every 12 hours  famotidine    Tablet 20 milliGRAM(s) Oral <User Schedule>  finasteride 5 milliGRAM(s) Oral every 24 hours  fluticasone propionate/ salmeterol 100-50 MICROgram(s) Diskus 1 Dose(s) Inhalation two times a day  heparin   Injectable 5000 Unit(s) SubCutaneous every 8 hours  hydrALAZINE 25 milliGRAM(s) Oral every 8 hours  isosorbide   dinitrate Tablet (ISORDIL) 10 milliGRAM(s) Oral three times a day  loperamide 2 milliGRAM(s) Oral three times a day  losartan 50 milliGRAM(s) Oral every 24 hours  naphazoline/pheniramine Solution 2 Drop(s) Both EYES two times a day  pancrelipase  (CREON 36,000 Lipase Units) 3 Capsule(s) Oral three times a day with meals  piperacillin/tazobactam IVPB.. 4.5 Gram(s) IV Intermittent every 8 hours  sodium chloride 3%  Inhalation 4 milliLiter(s) Inhalation every 12 hours  tamsulosin 0.4 milliGRAM(s) Oral every 24 hours  tiotropium 2.5 MICROgram(s) Inhaler 2 Puff(s) Inhalation daily    MEDICATIONS  (PRN):  acetaminophen     Tablet .. 650 milliGRAM(s) Oral every 6 hours PRN Mild Pain (1 - 3), Moderate Pain (4 - 6)  aluminum hydroxide/magnesium hydroxide/simethicone Suspension 30 milliLiter(s) Oral every 4 hours PRN Dyspepsia  ondansetron    Tablet 4 milliGRAM(s) Oral every 8 hours PRN Nausea and/or Vomiting  sodium chloride 0.65% Nasal 1 Spray(s) Both Nostrils two times a day PRN Nasal Congestion      Allergies    NSAIDs (Pruritus)  Aleve (Unknown)    Intolerances        LABS:                        12.1   6.15  )-----------( 195      ( 07 Dec 2024 12:42 )             38.0     12-07    137  |  104  |  19  ----------------------------<  136[H]  4.2   |  20[L]  |  2.04[H]    Ca    9.3      07 Dec 2024 12:42  Phos  2.7     12-07  Mg     1.9     12-07    TPro  6.1  /  Alb  2.6[L]  /  TBili  0.3  /  DBili  x   /  AST  16  /  ALT  10  /  AlkPhos  103  12-07      Urinalysis Basic - ( 07 Dec 2024 12:42 )    Color: x / Appearance: x / SG: x / pH: x  Gluc: 136 mg/dL / Ketone: x  / Bili: x / Urobili: x   Blood: x / Protein: x / Nitrite: x   Leuk Esterase: x / RBC: x / WBC x   Sq Epi: x / Non Sq Epi: x / Bacteria: x        RADIOLOGY & ADDITIONAL TESTS:  Reviewed INTERVAL HPI/OVERNIGHT EVENTS:  Patient was seen and examined at bedside.   Retaining 396cc. Voided 100cc. Pt does not feel urge to urinate.     VITAL SIGNS:  T(F): 97.7 (12-08-24 @ 05:22)  HR: 89 (12-08-24 @ 05:22)  BP: 167/96 (12-08-24 @ 05:22)  RR: 17 (12-08-24 @ 05:22)  SpO2: 94% (12-08-24 @ 05:22)  Wt(kg): --      12-07-24 @ 07:01  -  12-08-24 @ 07:00  --------------------------------------------------------  IN: 0 mL / OUT: 460 mL / NET: -460 mL    12-08-24 @ 07:01  -  12-08-24 @ 07:30  --------------------------------------------------------  IN: 0 mL / OUT: 100 mL / NET: -100 mL        PHYSICAL EXAM:    Constitutional: resting comfortably in bed; NAD  HEENT: NC/AT, PERRL, EOMI, anicteric sclera, no nasal discharge; uvula midline, no oropharyngeal erythema or exudates; MMM  Neck: supple; no JVD or thyromegaly  Respiratory: CTA B/L; no W/R/R, no retractions  Cardiac: +S1/S2; RRR; no M/R/G; PMI non-displaced  Gastrointestinal: soft, NT/ND; no rebound or guarding; +BSx4  Genitourinary: normal external genitalia  Back: spine midline, no bony tenderness or step-offs; no CVAT B/L  Extremities: WWP, no clubbing or cyanosis; no peripheral edema  Musculoskeletal: NROM x4; no joint swelling, tenderness or erythema  Vascular: 2+ radial, DP/PT pulses B/L  Dermatologic: skin warm, dry and intact; no rashes, wounds, or scars  Lymphatic: no submandibular or cervical LAD  Neurologic: AAOx3; CNII-XII grossly intact; no focal deficits  Psychiatric: affect and characteristics of appearance, verbalizations, behaviors are appropriate, denies SI/HI/AH/VH    MEDICATIONS  (STANDING):  albuterol/ipratropium for Nebulization 3 milliLiter(s) Nebulizer every 6 hours  aspirin  chewable 81 milliGRAM(s) Oral daily  atorvastatin 80 milliGRAM(s) Oral at bedtime  buPROPion XL (24-Hour) . 150 milliGRAM(s) Oral daily  carvedilol 3.125 milliGRAM(s) Oral every 12 hours  famotidine    Tablet 20 milliGRAM(s) Oral <User Schedule>  finasteride 5 milliGRAM(s) Oral every 24 hours  fluticasone propionate/ salmeterol 100-50 MICROgram(s) Diskus 1 Dose(s) Inhalation two times a day  heparin   Injectable 5000 Unit(s) SubCutaneous every 8 hours  hydrALAZINE 25 milliGRAM(s) Oral every 8 hours  isosorbide   dinitrate Tablet (ISORDIL) 10 milliGRAM(s) Oral three times a day  loperamide 2 milliGRAM(s) Oral three times a day  losartan 50 milliGRAM(s) Oral every 24 hours  naphazoline/pheniramine Solution 2 Drop(s) Both EYES two times a day  pancrelipase  (CREON 36,000 Lipase Units) 3 Capsule(s) Oral three times a day with meals  piperacillin/tazobactam IVPB.. 4.5 Gram(s) IV Intermittent every 8 hours  sodium chloride 3%  Inhalation 4 milliLiter(s) Inhalation every 12 hours  tamsulosin 0.4 milliGRAM(s) Oral every 24 hours  tiotropium 2.5 MICROgram(s) Inhaler 2 Puff(s) Inhalation daily    MEDICATIONS  (PRN):  acetaminophen     Tablet .. 650 milliGRAM(s) Oral every 6 hours PRN Mild Pain (1 - 3), Moderate Pain (4 - 6)  aluminum hydroxide/magnesium hydroxide/simethicone Suspension 30 milliLiter(s) Oral every 4 hours PRN Dyspepsia  ondansetron    Tablet 4 milliGRAM(s) Oral every 8 hours PRN Nausea and/or Vomiting  sodium chloride 0.65% Nasal 1 Spray(s) Both Nostrils two times a day PRN Nasal Congestion      Allergies    NSAIDs (Pruritus)  Aleve (Unknown)    Intolerances        LABS:                        12.1   6.15  )-----------( 195      ( 07 Dec 2024 12:42 )             38.0     12-07    137  |  104  |  19  ----------------------------<  136[H]  4.2   |  20[L]  |  2.04[H]    Ca    9.3      07 Dec 2024 12:42  Phos  2.7     12-07  Mg     1.9     12-07    TPro  6.1  /  Alb  2.6[L]  /  TBili  0.3  /  DBili  x   /  AST  16  /  ALT  10  /  AlkPhos  103  12-07      Urinalysis Basic - ( 07 Dec 2024 12:42 )    Color: x / Appearance: x / SG: x / pH: x  Gluc: 136 mg/dL / Ketone: x  / Bili: x / Urobili: x   Blood: x / Protein: x / Nitrite: x   Leuk Esterase: x / RBC: x / WBC x   Sq Epi: x / Non Sq Epi: x / Bacteria: x        RADIOLOGY & ADDITIONAL TESTS:  Reviewed INTERVAL HPI/OVERNIGHT EVENTS:  Patient was seen and examined at bedside.   Retaining 396cc. Voided 100cc. Pt does not feel urge to urinate. Slurred speech this AM. Consulted stroke. CTH repeated, negative for acute stroke.     VITAL SIGNS:  T(F): 97.7 (12-08-24 @ 05:22)  HR: 89 (12-08-24 @ 05:22)  BP: 167/96 (12-08-24 @ 05:22)  RR: 17 (12-08-24 @ 05:22)  SpO2: 94% (12-08-24 @ 05:22)  Wt(kg): --      12-07-24 @ 07:01  -  12-08-24 @ 07:00  --------------------------------------------------------  IN: 0 mL / OUT: 460 mL / NET: -460 mL    12-08-24 @ 07:01  -  12-08-24 @ 07:30  --------------------------------------------------------  IN: 0 mL / OUT: 100 mL / NET: -100 mL        PHYSICAL EXAM:    Constitutional: resting comfortably in bed; NAD  HEENT: NC/AT, PERRL, EOMI, anicteric sclera  Respiratory: rhonchi throughout   Cardiac: +S1/S2; RRR; no M/R/G; PMI non-displaced  Gastrointestinal: soft, NT/ND; no rebound or guarding; +BSx4  Extremities: WWP, no clubbing or cyanosis; no peripheral edema  Neurologic: AAOx3; CNII-XII grossly intact; no focal deficits; mild left facial droop       MEDICATIONS  (STANDING):  albuterol/ipratropium for Nebulization 3 milliLiter(s) Nebulizer every 6 hours  aspirin  chewable 81 milliGRAM(s) Oral daily  atorvastatin 80 milliGRAM(s) Oral at bedtime  buPROPion XL (24-Hour) . 150 milliGRAM(s) Oral daily  carvedilol 3.125 milliGRAM(s) Oral every 12 hours  famotidine    Tablet 20 milliGRAM(s) Oral <User Schedule>  finasteride 5 milliGRAM(s) Oral every 24 hours  fluticasone propionate/ salmeterol 100-50 MICROgram(s) Diskus 1 Dose(s) Inhalation two times a day  heparin   Injectable 5000 Unit(s) SubCutaneous every 8 hours  hydrALAZINE 25 milliGRAM(s) Oral every 8 hours  isosorbide   dinitrate Tablet (ISORDIL) 10 milliGRAM(s) Oral three times a day  loperamide 2 milliGRAM(s) Oral three times a day  losartan 50 milliGRAM(s) Oral every 24 hours  naphazoline/pheniramine Solution 2 Drop(s) Both EYES two times a day  pancrelipase  (CREON 36,000 Lipase Units) 3 Capsule(s) Oral three times a day with meals  piperacillin/tazobactam IVPB.. 4.5 Gram(s) IV Intermittent every 8 hours  sodium chloride 3%  Inhalation 4 milliLiter(s) Inhalation every 12 hours  tamsulosin 0.4 milliGRAM(s) Oral every 24 hours  tiotropium 2.5 MICROgram(s) Inhaler 2 Puff(s) Inhalation daily    MEDICATIONS  (PRN):  acetaminophen     Tablet .. 650 milliGRAM(s) Oral every 6 hours PRN Mild Pain (1 - 3), Moderate Pain (4 - 6)  aluminum hydroxide/magnesium hydroxide/simethicone Suspension 30 milliLiter(s) Oral every 4 hours PRN Dyspepsia  ondansetron    Tablet 4 milliGRAM(s) Oral every 8 hours PRN Nausea and/or Vomiting  sodium chloride 0.65% Nasal 1 Spray(s) Both Nostrils two times a day PRN Nasal Congestion      Allergies    NSAIDs (Pruritus)  Aleve (Unknown)    Intolerances        LABS:                        12.1   6.15  )-----------( 195      ( 07 Dec 2024 12:42 )             38.0     12-07    137  |  104  |  19  ----------------------------<  136[H]  4.2   |  20[L]  |  2.04[H]    Ca    9.3      07 Dec 2024 12:42  Phos  2.7     12-07  Mg     1.9     12-07    TPro  6.1  /  Alb  2.6[L]  /  TBili  0.3  /  DBili  x   /  AST  16  /  ALT  10  /  AlkPhos  103  12-07      Urinalysis Basic - ( 07 Dec 2024 12:42 )    Color: x / Appearance: x / SG: x / pH: x  Gluc: 136 mg/dL / Ketone: x  / Bili: x / Urobili: x   Blood: x / Protein: x / Nitrite: x   Leuk Esterase: x / RBC: x / WBC x   Sq Epi: x / Non Sq Epi: x / Bacteria: x        RADIOLOGY & ADDITIONAL TESTS:  Reviewed

## 2024-12-09 DIAGNOSIS — M10.9 GOUT, UNSPECIFIED: ICD-10-CM

## 2024-12-09 LAB
% ALBUMIN: 46.7 % — SIGNIFICANT CHANGE UP
% ALPHA 1: 8.3 % — SIGNIFICANT CHANGE UP
% ALPHA 2: 14.6 % — SIGNIFICANT CHANGE UP
% BETA: 14.5 % — SIGNIFICANT CHANGE UP
% GAMMA: 15.9 % — SIGNIFICANT CHANGE UP
% M SPIKE: SIGNIFICANT CHANGE UP
ALBUMIN SERPL ELPH-MCNC: 2.4 G/DL — LOW (ref 3.6–5.5)
ALBUMIN SERPL ELPH-MCNC: 2.5 G/DL — LOW (ref 3.3–5)
ALBUMIN/GLOB SERPL ELPH: 0.9 RATIO — SIGNIFICANT CHANGE UP
ALP SERPL-CCNC: 92 U/L — SIGNIFICANT CHANGE UP (ref 40–120)
ALPHA1 GLOB SERPL ELPH-MCNC: 0.4 G/DL — SIGNIFICANT CHANGE UP (ref 0.1–0.4)
ALPHA2 GLOB SERPL ELPH-MCNC: 0.8 G/DL — SIGNIFICANT CHANGE UP (ref 0.5–1)
ALT FLD-CCNC: 9 U/L — LOW (ref 10–45)
ANION GAP SERPL CALC-SCNC: 11 MMOL/L — SIGNIFICANT CHANGE UP (ref 5–17)
AST SERPL-CCNC: 19 U/L — SIGNIFICANT CHANGE UP (ref 10–40)
B-GLOBULIN SERPL ELPH-MCNC: 0.8 G/DL — SIGNIFICANT CHANGE UP (ref 0.5–1)
BASOPHILS # BLD AUTO: 0.04 K/UL — SIGNIFICANT CHANGE UP (ref 0–0.2)
BASOPHILS NFR BLD AUTO: 0.7 % — SIGNIFICANT CHANGE UP (ref 0–2)
BILIRUB SERPL-MCNC: 0.3 MG/DL — SIGNIFICANT CHANGE UP (ref 0.2–1.2)
BUN SERPL-MCNC: 20 MG/DL — SIGNIFICANT CHANGE UP (ref 7–23)
CALCIUM SERPL-MCNC: 9.2 MG/DL — SIGNIFICANT CHANGE UP (ref 8.4–10.5)
CHLORIDE SERPL-SCNC: 105 MMOL/L — SIGNIFICANT CHANGE UP (ref 96–108)
CO2 SERPL-SCNC: 19 MMOL/L — LOW (ref 22–31)
CREAT SERPL-MCNC: 2.18 MG/DL — HIGH (ref 0.5–1.3)
CRYOGLOB SERPL-MCNC: NEGATIVE — SIGNIFICANT CHANGE UP
EGFR: 30 ML/MIN/1.73M2 — LOW
EOSINOPHIL # BLD AUTO: 0.15 K/UL — SIGNIFICANT CHANGE UP (ref 0–0.5)
EOSINOPHIL NFR BLD AUTO: 2.5 % — SIGNIFICANT CHANGE UP (ref 0–6)
FERRITIN SERPL-MCNC: 832 NG/ML — HIGH (ref 30–400)
GAMMA GLOBULIN: 0.8 G/DL — SIGNIFICANT CHANGE UP (ref 0.6–1.6)
GLUCOSE SERPL-MCNC: 88 MG/DL — SIGNIFICANT CHANGE UP (ref 70–99)
HCT VFR BLD CALC: 35 % — LOW (ref 39–50)
HGB BLD-MCNC: 10.7 G/DL — LOW (ref 13–17)
IMM GRANULOCYTES NFR BLD AUTO: 0.7 % — SIGNIFICANT CHANGE UP (ref 0–0.9)
INTERPRETATION SERPL IFE-IMP: SIGNIFICANT CHANGE UP
IRON SATN MFR SERPL: 33 % — SIGNIFICANT CHANGE UP (ref 16–55)
IRON SATN MFR SERPL: 70 UG/DL — SIGNIFICANT CHANGE UP (ref 45–165)
LYMPHOCYTES # BLD AUTO: 1.71 K/UL — SIGNIFICANT CHANGE UP (ref 1–3.3)
LYMPHOCYTES # BLD AUTO: 28.8 % — SIGNIFICANT CHANGE UP (ref 13–44)
M-SPIKE: SIGNIFICANT CHANGE UP (ref 0–0)
MAGNESIUM SERPL-MCNC: 1.9 MG/DL — SIGNIFICANT CHANGE UP (ref 1.6–2.6)
MCHC RBC-ENTMCNC: 30.2 PG — SIGNIFICANT CHANGE UP (ref 27–34)
MCHC RBC-ENTMCNC: 30.6 G/DL — LOW (ref 32–36)
MCV RBC AUTO: 98.9 FL — SIGNIFICANT CHANGE UP (ref 80–100)
MONOCYTES # BLD AUTO: 0.79 K/UL — SIGNIFICANT CHANGE UP (ref 0–0.9)
MONOCYTES NFR BLD AUTO: 13.3 % — SIGNIFICANT CHANGE UP (ref 2–14)
NEUTROPHILS # BLD AUTO: 3.21 K/UL — SIGNIFICANT CHANGE UP (ref 1.8–7.4)
NEUTROPHILS NFR BLD AUTO: 54 % — SIGNIFICANT CHANGE UP (ref 43–77)
NRBC # BLD: 0 /100 WBCS — SIGNIFICANT CHANGE UP (ref 0–0)
PHOSPHATE SERPL-MCNC: 2.8 MG/DL — SIGNIFICANT CHANGE UP (ref 2.5–4.5)
PHOSPHOLIPASE A2 RECEPTOR ELISA: <1.8 RU/ML — SIGNIFICANT CHANGE UP (ref 0–19.9)
PLATELET # BLD AUTO: 194 K/UL — SIGNIFICANT CHANGE UP (ref 150–400)
POTASSIUM SERPL-MCNC: 4.1 MMOL/L — SIGNIFICANT CHANGE UP (ref 3.5–5.3)
POTASSIUM SERPL-SCNC: 4.1 MMOL/L — SIGNIFICANT CHANGE UP (ref 3.5–5.3)
PROT PATTERN SERPL ELPH-IMP: SIGNIFICANT CHANGE UP
PROT SERPL-MCNC: 5.2 G/DL — LOW (ref 6–8.3)
PROT SERPL-MCNC: 5.7 G/DL — LOW (ref 6–8.3)
RBC # BLD: 3.54 M/UL — LOW (ref 4.2–5.8)
RBC # FLD: 15.3 % — HIGH (ref 10.3–14.5)
SODIUM SERPL-SCNC: 135 MMOL/L — SIGNIFICANT CHANGE UP (ref 135–145)
TIBC SERPL-MCNC: 209 UG/DL — LOW (ref 220–430)
UIBC SERPL-MCNC: 139 UG/DL — SIGNIFICANT CHANGE UP (ref 110–370)
WBC # BLD: 5.94 K/UL — SIGNIFICANT CHANGE UP (ref 3.8–10.5)
WBC # FLD AUTO: 5.94 K/UL — SIGNIFICANT CHANGE UP (ref 3.8–10.5)

## 2024-12-09 PROCEDURE — 99233 SBSQ HOSP IP/OBS HIGH 50: CPT | Mod: GC

## 2024-12-09 RX ORDER — LIDOCAINE 40 MG/G
1 CREAM TOPICAL ONCE
Refills: 0 | Status: COMPLETED | OUTPATIENT
Start: 2024-12-09 | End: 2024-12-09

## 2024-12-09 RX ORDER — SIROLIMUS 1 MG/1
2 TABLET ORAL EVERY 24 HOURS
Refills: 0 | Status: DISCONTINUED | OUTPATIENT
Start: 2024-12-09 | End: 2024-12-09

## 2024-12-09 RX ORDER — COLCHICINE 0.6 MG
0.3 TABLET ORAL EVERY 24 HOURS
Refills: 0 | Status: COMPLETED | OUTPATIENT
Start: 2024-12-09 | End: 2024-12-12

## 2024-12-09 RX ORDER — ACETAMINOPHEN 500MG 500 MG/1
650 TABLET, COATED ORAL ONCE
Refills: 0 | Status: COMPLETED | OUTPATIENT
Start: 2024-12-09 | End: 2024-12-09

## 2024-12-09 RX ORDER — SIROLIMUS 1 MG/1
2 TABLET ORAL EVERY 24 HOURS
Refills: 0 | Status: DISCONTINUED | OUTPATIENT
Start: 2024-12-10 | End: 2024-12-13

## 2024-12-09 RX ADMIN — CARVEDILOL 3.12 MILLIGRAM(S): 25 TABLET, FILM COATED ORAL at 18:09

## 2024-12-09 RX ADMIN — Medication 5000 UNIT(S): at 22:21

## 2024-12-09 RX ADMIN — SIROLIMUS 2 MILLIGRAM(S): 1 TABLET ORAL at 10:35

## 2024-12-09 RX ADMIN — ISOSORBIDE DINITRATE 10 MILLIGRAM(S): 40 TABLET ORAL at 18:56

## 2024-12-09 RX ADMIN — HYDRALAZINE HYDROCHLORIDE 25 MILLIGRAM(S): 10 TABLET ORAL at 06:29

## 2024-12-09 RX ADMIN — PIPERACILLIN SODIUM AND TAZOBACTAM SODIUM 25 GRAM(S): 4; .5 INJECTION, POWDER, LYOPHILIZED, FOR SOLUTION INTRAVENOUS at 13:29

## 2024-12-09 RX ADMIN — Medication 81 MILLIGRAM(S): at 11:43

## 2024-12-09 RX ADMIN — Medication 2 MILLIGRAM(S): at 06:29

## 2024-12-09 RX ADMIN — LIDOCAINE 1 PATCH: 40 CREAM TOPICAL at 17:13

## 2024-12-09 RX ADMIN — HYDRALAZINE HYDROCHLORIDE 25 MILLIGRAM(S): 10 TABLET ORAL at 22:09

## 2024-12-09 RX ADMIN — TAMSULOSIN HYDROCHLORIDE 0.4 MILLIGRAM(S): 0.4 CAPSULE ORAL at 18:09

## 2024-12-09 RX ADMIN — CARVEDILOL 3.12 MILLIGRAM(S): 25 TABLET, FILM COATED ORAL at 06:29

## 2024-12-09 RX ADMIN — Medication 0.3 MILLIGRAM(S): at 13:23

## 2024-12-09 RX ADMIN — PIPERACILLIN SODIUM AND TAZOBACTAM SODIUM 25 GRAM(S): 4; .5 INJECTION, POWDER, LYOPHILIZED, FOR SOLUTION INTRAVENOUS at 06:29

## 2024-12-09 RX ADMIN — Medication 2 DROP(S): at 06:41

## 2024-12-09 RX ADMIN — Medication 5 MILLIGRAM(S): at 11:50

## 2024-12-09 RX ADMIN — ACETAMINOPHEN 500MG 650 MILLIGRAM(S): 500 TABLET, COATED ORAL at 18:12

## 2024-12-09 RX ADMIN — ACETAMINOPHEN 500MG 650 MILLIGRAM(S): 500 TABLET, COATED ORAL at 17:12

## 2024-12-09 RX ADMIN — ISOSORBIDE DINITRATE 10 MILLIGRAM(S): 40 TABLET ORAL at 11:44

## 2024-12-09 RX ADMIN — LIDOCAINE 1 PATCH: 40 CREAM TOPICAL at 19:25

## 2024-12-09 RX ADMIN — LOSARTAN POTASSIUM 100 MILLIGRAM(S): 100 TABLET, FILM COATED ORAL at 11:53

## 2024-12-09 RX ADMIN — HYDRALAZINE HYDROCHLORIDE 25 MILLIGRAM(S): 10 TABLET ORAL at 13:29

## 2024-12-09 RX ADMIN — Medication 3 CAPSULE(S): at 11:39

## 2024-12-09 RX ADMIN — ISOSORBIDE DINITRATE 10 MILLIGRAM(S): 40 TABLET ORAL at 06:29

## 2024-12-09 RX ADMIN — FAMOTIDINE 20 MILLIGRAM(S): 20 TABLET, FILM COATED ORAL at 11:52

## 2024-12-09 RX ADMIN — IPRATROPIUM BROMIDE AND ALBUTEROL SULFATE 3 MILLILITER(S): 2.5; .5 SOLUTION RESPIRATORY (INHALATION) at 13:29

## 2024-12-09 RX ADMIN — Medication 3 CAPSULE(S): at 18:31

## 2024-12-09 RX ADMIN — LIDOCAINE 1 PATCH: 40 CREAM TOPICAL at 12:33

## 2024-12-09 RX ADMIN — Medication 2 MILLIGRAM(S): at 18:56

## 2024-12-09 RX ADMIN — Medication 2 MILLIGRAM(S): at 11:43

## 2024-12-09 RX ADMIN — PIPERACILLIN SODIUM AND TAZOBACTAM SODIUM 25 GRAM(S): 4; .5 INJECTION, POWDER, LYOPHILIZED, FOR SOLUTION INTRAVENOUS at 22:21

## 2024-12-09 RX ADMIN — SODIUM CHLORIDE 4 MILLILITER(S): 9 INJECTION, SOLUTION INTRAMUSCULAR; INTRAVENOUS; SUBCUTANEOUS at 22:21

## 2024-12-09 RX ADMIN — IPRATROPIUM BROMIDE AND ALBUTEROL SULFATE 3 MILLILITER(S): 2.5; .5 SOLUTION RESPIRATORY (INHALATION) at 01:21

## 2024-12-09 RX ADMIN — FAMOTIDINE 20 MILLIGRAM(S): 20 TABLET, FILM COATED ORAL at 18:09

## 2024-12-09 RX ADMIN — Medication 80 MILLIGRAM(S): at 22:21

## 2024-12-09 RX ADMIN — Medication 150 MILLIGRAM(S): at 11:43

## 2024-12-09 RX ADMIN — IPRATROPIUM BROMIDE AND ALBUTEROL SULFATE 3 MILLILITER(S): 2.5; .5 SOLUTION RESPIRATORY (INHALATION) at 18:09

## 2024-12-09 NOTE — CONSULT NOTE ADULT - ASSESSMENT
79y yo M PMHx hepatitis C s/p liver transplant in 3/2019 for cryptogenic cirrhosis (on sirolimus for 5 years), unspecified autonomic nervous system disorder w/ hx of orthostasis, COPD, Mycobacterium avium complex lung disease, chronic anemia, amaurosis fugax, Crohn's disease, BPH, and unspecified pancreatic insufficiency. Patient presented to the ED on 12/2 after having fallen at home with a head-strike, without loss of consciousness. Found to be hyponatremic and have hypertensive urgency. Palliative consulted to discuss goals of care and treatment preferences in the setting of complex medical history.     Ongoing GOC to continue with wife at bedside tomorrow.       -- full note to follow    79y yo M PMHx hepatitis C s/p liver transplant in 3/2019 for cryptogenic cirrhosis (on sirolimus for 5 years), unspecified autonomic nervous system disorder w/ hx of orthostasis, COPD, Mycobacterium avium complex lung disease, chronic anemia, amaurosis fugax, Crohn's disease, BPH, and unspecified pancreatic insufficiency. Patient presented to the ED on 12/2 after having fallen at home with a head-strike, without loss of consciousness. Found to be hyponatremic and have hypertensive urgency. Palliative consulted to discuss goals of care and treatment preferences in the setting of complex medical history.     Clinically improved, now on RMF. Ongoing GOC to continue with wife at bedside tomorrow. Pain to R knee 2/2 gout flare, mgmt per primary team. Consider liberalizing tylenol.       -- full note to follow

## 2024-12-09 NOTE — PROGRESS NOTE ADULT - PROBLEM SELECTOR PLAN 7
Patient with a hx of CKD, Hep C, Liver tx. Not currently getting dialysis. Baseline Cr 2.74 in early November.   UA- Protein>1000, RBC-26 represents a GN w/ nephritic range proteinuria    [facility] my chart accessed on daughters phone  - 11/21/2024 KFLC-272.2, LFLC- 28.4. K/L- 9.58, m-spike+, IgG-1089, iGa-211, iGm-72  - 11/21/2024  Uric acid-  6.7, LDH- 185  - 6/3/2024- Renal biopsy-                          - H&E- (1) segmental and global glomerulosclerosis, (2) diffuse global glomerulosclerosis (63%), w/ moderate to marked tubular atrophy & interstitial fibrosis w/ marked arterial and arteriolar sclerosis                         - LM - 16/24 glomeruli: globally sclerotic or show chronic hypoperfusion, 6/24: mild to moderate mesangial prominence, 2/24: segmental sclerosis, several bhavik show segmental "double contour" formation                         - IF - 4/6 globally sclerotic, Igm, Kappa, Lambda: trace granular mesagngial staining, IgG, IgA, C1q: no stain, 1+ C3- along tubular BM and in vessels, Casts- bitypic for both light chains. albumin neg, Fibrin neg                         - EM- 1/2 segmental sclerosis w/ subepithelial reparative changes as well as globular deposits consistent w/ hyaline w/ focal foot effacement.  GBM thickness mildly increaed, Subendothelial space:  no electron lucent expansion, No dense deposits. Mesangium: small electron dense deposits. Foci of mesangial interposition. No amyloid.   - 5/7/2024- iPTH- 112  - (3/2024) 24H Urine protein -1710, 75.2% paraprot excretion/24h  - 2/27/2024- 24H urine- 5-HIAA- 3.6 (WNL), Metanephrines- 35 (), normetanephrine- 122 (156-729), MPO neg, PR3 neg    - fu GN workup: Autoimmune (LAWANDA, ANCA, C3, C4, Cryoglobulin, anti-GBM ds, dsDNA), infection (HIV, Hep B, Hep C),   - continue to monitor  - avoid nephrotoxic agents - Treated for Mac for 8 weeks in summer 2023 but d/c'd given side effects mary anne with rifampin including diarrhea and orthostasis which are still ongoing.

## 2024-12-09 NOTE — PROGRESS NOTE ADULT - PROBLEM SELECTOR PLAN 10
Hemoglobin 11.8 on presentation. Could be 2/2 anemia of chronic disease or iron deficiency given CKD.   - continue to monitor Elevated Alk Phos to 148 on admission. Likely elevated in the setting of pancreatic insufficiency. Pt has chronic diarrhea.     [facility] my chart accessed on daughters phone  12/22/2023 MRI/MRCP abdomen w/ wo IV con: few pancreatic cystic lesions w/ indolent growth 1.6cm within pancreatic tail. no suspicious internal features, no main uct dilation. No solid mass  - C-Peptide- 10.4 (range 1.1-4.4)  - Fructosamine: 338 (0-285)  - low fecal elastatse 10/2023  - CMV+ 10/2023  - f/u GGT   - c/w home Creon   - c/w home loperamide

## 2024-12-09 NOTE — PROGRESS NOTE ADULT - SUBJECTIVE AND OBJECTIVE BOX
Hepatology Consult Progress Note:     OVERNIGHT EVENTS: HILARY.    SUBJECTIVE / INTERVAL HPI:   Patient seen and examined at bedside. Reports some knee pain 2/2 gout. Otherwise no acute complaints at this time.       VITAL SIGNS:  Vital Signs Last 24 Hrs  T(C): 36.5 (09 Dec 2024 21:13), Max: 36.7 (09 Dec 2024 05:49)  T(F): 97.7 (09 Dec 2024 21:13), Max: 98 (09 Dec 2024 05:49)  HR: 90 (09 Dec 2024 21:13) (88 - 101)  BP: 142/94 (09 Dec 2024 21:13) (126/73 - 162/106)  BP(mean): 108 (09 Dec 2024 05:49) (108 - 124)  RR: 17 (09 Dec 2024 21:13) (17 - 19)  SpO2: 98% (09 Dec 2024 21:13) (97% - 98%)    Parameters below as of 09 Dec 2024 21:13  Patient On (Oxygen Delivery Method): room air        12-08-24 @ 07:01  -  12-09-24 @ 07:00  --------------------------------------------------------  IN: 0 mL / OUT: 100 mL / NET: -100 mL    12-09-24 @ 07:01  -  12-09-24 @ 21:35  --------------------------------------------------------  IN: 0 mL / OUT: 1250 mL / NET: -1250 mL      PHYSICAL EXAM:  General: No acute distress, thin, frail, elderly   Lungs: Normal respiratory effort and no intercostal retractions  Cardiovascular: RRR  Abdomen: Soft, non-tender, non-distended  Neurological: Alert and oriented x3  Skin: Warm and dry. No obvious rash  Extremities: swollen right knee       MEDICATIONS:  MEDICATIONS  (STANDING):  albuterol/ipratropium for Nebulization 3 milliLiter(s) Nebulizer every 6 hours  aspirin  chewable 81 milliGRAM(s) Oral daily  atorvastatin 80 milliGRAM(s) Oral at bedtime  buPROPion XL (24-Hour) . 150 milliGRAM(s) Oral daily  carvedilol 3.125 milliGRAM(s) Oral every 12 hours  colchicine 0.3 milliGRAM(s) Oral every 24 hours  famotidine    Tablet 20 milliGRAM(s) Oral <User Schedule>  finasteride 5 milliGRAM(s) Oral every 24 hours  fluticasone propionate/ salmeterol 100-50 MICROgram(s) Diskus 1 Dose(s) Inhalation two times a day  heparin   Injectable 5000 Unit(s) SubCutaneous every 8 hours  hydrALAZINE 25 milliGRAM(s) Oral every 8 hours  isosorbide   dinitrate Tablet (ISORDIL) 10 milliGRAM(s) Oral three times a day  loperamide 2 milliGRAM(s) Oral three times a day  losartan 100 milliGRAM(s) Oral every 24 hours  naphazoline/pheniramine Solution 2 Drop(s) Both EYES two times a day  pancrelipase  (CREON 36,000 Lipase Units) 3 Capsule(s) Oral three times a day with meals  piperacillin/tazobactam IVPB.. 4.5 Gram(s) IV Intermittent every 8 hours  sodium chloride 3%  Inhalation 4 milliLiter(s) Inhalation every 12 hours  tamsulosin 0.4 milliGRAM(s) Oral every 24 hours  tiotropium 2.5 MICROgram(s) Inhaler 2 Puff(s) Inhalation daily    MEDICATIONS  (PRN):  acetaminophen     Tablet .. 650 milliGRAM(s) Oral every 6 hours PRN Mild Pain (1 - 3), Moderate Pain (4 - 6)  aluminum hydroxide/magnesium hydroxide/simethicone Suspension 30 milliLiter(s) Oral every 4 hours PRN Dyspepsia  ondansetron    Tablet 4 milliGRAM(s) Oral every 8 hours PRN Nausea and/or Vomiting  sodium chloride 0.65% Nasal 1 Spray(s) Both Nostrils two times a day PRN Nasal Congestion      ALLERGIES:  Allergies    NSAIDs (Pruritus)  Aleve (Unknown)    Intolerances        LABS:                        10.7   5.94  )-----------( 194      ( 09 Dec 2024 05:30 )             35.0     12-09    135  |  105  |  20  ----------------------------<  88  4.1   |  19[L]  |  2.18[H]    Ca    9.2      09 Dec 2024 05:30  Phos  2.8     12-09  Mg     1.9     12-09    TPro  5.7[L]  /  Alb  2.5[L]  /  TBili  0.3  /  DBili  x   /  AST  19  /  ALT  9[L]  /  AlkPhos  92  12-09      Urinalysis Basic - ( 09 Dec 2024 05:30 )    Color: x / Appearance: x / SG: x / pH: x  Gluc: 88 mg/dL / Ketone: x  / Bili: x / Urobili: x   Blood: x / Protein: x / Nitrite: x   Leuk Esterase: x / RBC: x / WBC x   Sq Epi: x / Non Sq Epi: x / Bacteria: x      CAPILLARY BLOOD GLUCOSE      POCT Blood Glucose.: 119 mg/dL (08 Dec 2024 10:12)        RADIOLOGY & ADDITIONAL TESTS: Reviewed.

## 2024-12-09 NOTE — CONSULT NOTE ADULT - CONSULT REQUESTED DATE/TIME
03-Dec-2024 12:00
03-Dec-2024 05:27
03-Dec-2024 15:46
09-Dec-2024 17:54
03-Dec-2024 18:46
03-Dec-2024 00:32

## 2024-12-09 NOTE — PROGRESS NOTE ADULT - PROBLEM SELECTOR PLAN 5
Presentation: iWOB, recent sick contact: wife (ROBI), BiPAP ---->NC. Home meds: Advair, Spiriva, hypertonic saline. Does not use home O2.   CT showing b/l cavitary+solid lung lesion concerning for primary lung malignancy, RUL s/p wedge resection, cannot exclude TB or fungal etiology w/ small left pleural effusion and Diffuse interstitial pulmonary edema.    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 [facility] my chart accessed on daughters phone  - Myocardial SPECT-  LVEF 44%, EKG- SR, rare PVC (11/2024)  - Tc-99 PYP amyloid scan- negative for TTR amyloid (10/29/2024)  - US Heart- small pericardial effusion (9/5/2024)  - [id number]-[id number] (9/22/2024)    Patient with elevated BNP to 22,740. Patient wife reports hx of elevated BNP to 35k. Bedside POCUS showing an EF of ˜45%, no wall motion abnormalities, w/ dilated IVC on echo. Patient is s/p lasix 60mg IV in ED. Follows at [facility] cardiology.   - EF- 35%  - Strict Is&Os   - f/u formal TTE  - hold on additional diuresis for now

## 2024-12-09 NOTE — PROGRESS NOTE ADULT - PROBLEM SELECTOR PLAN 9
Elevated Alk Phos to 148 on admission. Likely elevated in the setting of pancreatic insufficiency. Pt has chronic diarrhea.     [facility] my chart accessed on daughters phone  12/22/2023 MRI/MRCP abdomen w/ wo IV con: few pancreatic cystic lesions w/ indolent growth 1.6cm within pancreatic tail. no suspicious internal features, no main uct dilation. No solid mass  - C-Peptide- 10.4 (range 1.1-4.4)  - Fructosamine: 338 (0-285)  - low fecal elastatse 10/2023  - CMV+ 10/2023  - f/u GGT   - c/w home Creon   - c/w home loperamide S/p liver transplant in 3/2019 for cryptogenic cirrhosis. Home sirolimus 2mg QD.   - c/w sirolimus 2mg QD  -f/u level tomorrow AM   -hepatology following

## 2024-12-09 NOTE — CONSULT NOTE ADULT - SUBJECTIVE AND OBJECTIVE BOX
[facility] Geriatrics and Palliative Care  Yamilet Milligan, Geriatrics & Palliative Care NP  Contact Info: Call [phone] (HEAL Line) or message on Microsoft Teams    HPI:  WANDER VAZQUEZ is a 79y year old Male with a PMHx significant for hepatitis C s/p liver transplant in 3/2019 for cryptogenic cirrhosis (on sirolimus for 5 years), unspecified autonomic nervous system disorder w/ hx of orthostasis, COPD, Mycobacterium avium complex lung disease, chronic anemia, amaurosis fugax, Crohn's disease, BPH, and unspecified pancreatic insufficiency. Patient presented to the ED on 12/2 after having fallen at home with a head-strike, without loss of consciousness. Most of history is taken from patient's wife, who joined patient at bedside in ED. Patient's wife was in the shower and patient's wife heard a thud, saw him down on the ground and patient was altered. On presentation his blood pressure was 200/127 without vision changes, or chest pain (patient allegedly had BP of 240/140 when initially found by EMS). Of note, the patient's wife had a recent URI for which the wife suspects the patient may have started developing symptoms 1 week ago. Patient was altered in the ED.     Of note baseline labs for patient retrieved in early November were ALT 9, AST 16, Bilirubin 0.4, direct 0.2, Cr 2.74, Albumin 3.2, K 4, Na 139, GGT 18, Mg 1.4, Phos 3.6.    ED COURSE  Vitals: T(C): 36.4 (12-02-24 @ 23:57), Max: 36.4 (12-02-24 @ 23:57); HR: 106 (12-03-24 @ 02:14) (106 - 120); BP: 149/92 (12-03-24 @ 02:14) (147/100 - 200/127); RR: 24 (12-03-24 @ 02:14) (24 - 24); SpO2: 98% (12-03-24 @ 02:14) (98% - 99%)  Labs:   EKG: EKG - normal axis, sinus tachycardia w/ premature supraventricular complexes; no ST elevations or depressions  Imaging: CT cervical spine --> no acute pathologies; CT chest --> small left pleural effusion, multiple cavitary and soft tissue mass lesions in the bilateral lungs, severe centrilobar emphysema; CT Perfusion --> no large vessel occlusions  Interventions:   Consults: MICU, Neuro ICU, CCU; stroke code called    INTERIM SUBJECTIVE:  WANDER VAZQUEZ is doing okay this early morning. Has no acute complaints. He is alert and oriented. Denies fever, chills, headache, chest pain. (03 Dec 2024 03:31)      PAST MEDICAL & SURGICAL HISTORY:  Chronic obstructive bronchitis      Other ascites      Cirrhosis      Spleen enlarged      Crohn disease      H/O squamous cell carcinoma      Carotid artery disease      H/O right hemicolectomy  Performed in 1976      S/P thoracotomy  removal of benign granuloma          FAMILY HISTORY:  No pertinent family history in first degree relatives     Reviewed; no history of     in mother or father    Opiate Naive (Y/N):   iStop reviewed (Y/N): Yes.   No Rx found on iStop review (Ref#:           Items that are not checked are not present  PSYCHOSOCIAL ASSESSMENT:  - Significant other/partner: [  ]  Children: [  ]  - Living Situation: Home [  ] Long term care [  ]  Rehab[  ]  Other[  ]  - Support system: strong[  ] adequate[  ] inadequate[  ]  - Tenriism/Spiritual practice: deferred   - Role of organized Mormon important [  ] some [  ] unable to assess dt pt mentation [  ]  - Coping: well[  ]  with difficulty[  ]  poor coping[  ]  unable to assess dt pt mentation [  ]    ADVANCE DIRECTIVES:    - MOLST[  ]     - Living Will [  ]     DECISION MAKER(s):  - Health Care Proxy(s) [  ]  Surrogate(s)[  ] Guardian[  ]           Name(s)/Phone Number(s):     BASELINE (I)ADLs (prior to admission):  - [place]:  Total[  ] Moderate[  ] Dependent[  ]    Allergies    NSAIDs (Pruritus)  Aleve (Unknown)    Intolerances        Medications:      MEDICATIONS  (STANDING):  albuterol/ipratropium for Nebulization 3 milliLiter(s) Nebulizer every 6 hours  aspirin  chewable 81 milliGRAM(s) Oral daily  atorvastatin 80 milliGRAM(s) Oral at bedtime  buPROPion XL (24-Hour) . 150 milliGRAM(s) Oral daily  carvedilol 3.125 milliGRAM(s) Oral every 12 hours  colchicine 0.3 milliGRAM(s) Oral every 24 hours  famotidine    Tablet 20 milliGRAM(s) Oral <User Schedule>  finasteride 5 milliGRAM(s) Oral every 24 hours  fluticasone propionate/ salmeterol 100-50 MICROgram(s) Diskus 1 Dose(s) Inhalation two times a day  heparin   Injectable 5000 Unit(s) SubCutaneous every 8 hours  hydrALAZINE 25 milliGRAM(s) Oral every 8 hours  isosorbide   dinitrate Tablet (ISORDIL) 10 milliGRAM(s) Oral three times a day  loperamide 2 milliGRAM(s) Oral three times a day  losartan 100 milliGRAM(s) Oral every 24 hours  naphazoline/pheniramine Solution 2 Drop(s) Both EYES two times a day  pancrelipase  (CREON 36,000 Lipase Units) 3 Capsule(s) Oral three times a day with meals  piperacillin/tazobactam IVPB.. 4.5 Gram(s) IV Intermittent every 8 hours  sodium chloride 3%  Inhalation 4 milliLiter(s) Inhalation every 12 hours  tamsulosin 0.4 milliGRAM(s) Oral every 24 hours  tiotropium 2.5 MICROgram(s) Inhaler 2 Puff(s) Inhalation daily    MEDICATIONS  (PRN):  acetaminophen     Tablet .. 650 milliGRAM(s) Oral every 6 hours PRN Mild Pain (1 - 3), Moderate Pain (4 - 6)  aluminum hydroxide/magnesium hydroxide/simethicone Suspension 30 milliLiter(s) Oral every 4 hours PRN Dyspepsia  ondansetron    Tablet 4 milliGRAM(s) Oral every 8 hours PRN Nausea and/or Vomiting  sodium chloride 0.65% Nasal 1 Spray(s) Both Nostrils two times a day PRN Nasal Congestion      24 hour PRN use:      acetaminophen     Tablet ..   650 milliGRAM(s) Oral (12-08-24 @ 19:49)    acetaminophen     Tablet ..   650 milliGRAM(s) Oral (12-09-24 @ 17:12)    buPROPion XL (24-Hour) .   150 milliGRAM(s) Oral (12-09-24 @ 11:43)        PRESENT SYMPTOMS:   [ ] Patient unable to self report   Source if other than patient:  [ ]Family   [ ]Team     Pain [  ]  Location :        Quality:  Radiation:  Timing:  Aggravating factors:  Minimal acceptable level (0-10 scale):   Severity in last 24h (0-10 scale) :  Current score (0-10 scale):  Improves with:     PAIN AD Score:   [email or web address] (press ctrl +  left click to view)    If [  ], pt denies symptom.   Dyspnea:         [  ]  Anxiety:           [  ]  Difficulty sleeping: [  ]  Fatigue:           [  ]  Nausea:           [  ]  Loss of appetite:     [  ]  Dysphagia: [  ]  Constipation:   [  ]        LBM   Other Symptoms:    All other review of systems negative [  ]    ECOG Performance:       Current Palliative Performance Scale/Karnofsky Score:    %  Preadmit Karnofsky:   %          PEx:  General:   alert  oriented x       lethargic, distressed, cachexia,  nonverbal,  unarousable, verbal  Behavioral: Anxiety  Delirium Agitation Cooperative  HEENT: atraumatic,  No temporal wasting,  No dry mouth,  ET tube/trach  RESP: Reg rhythm, No  tachypnea/labored breathing,  No audible excessive secretions,  CTAB, diminished bilat bases  CV: RRR, S1S2,  No  tachycardia  GI: soft non distended non tender  incontinent               PEG/NG/OG tube                 constipation  last BM:   : normal  incontinent  oliguria/anuria  katz  MUSK: weakness x4,  edema, ambulatory with assistance,   mostly/fully  BB/WC bound  SKIN:  Poor skin turgor, Pallor, Pressure ulcer stage:   NEURO:  No deficits, cognitive impairment, encephalopathic dsyphagia dysarthria paresis  Oral intake ability: unable/only mouth care, minimal moderate full capability      T(C): 36.4 (12-09-24 @ 13:28), Max: 36.7 (12-09-24 @ 05:49)  HR: 101 (12-09-24 @ 13:28) (80 - 101)  BP: 126/73 (12-09-24 @ 13:28) (126/73 - 162/106)  RR: 17 (12-09-24 @ 13:28) (17 - 19)  SpO2: 97% (12-09-24 @ 13:28) (97% - 98%)  Wt(kg): --    Labs:    CBC:                        10.7   5.94  )-----------( 194      ( 09 Dec 2024 05:30 )             35.0     CMP:    12-09    135  |  105  |  20  ----------------------------<  88  4.1   |  19[L]  |  2.18[H]    Ca    9.2      09 Dec 2024 05:30  Phos  2.8     12-09  Mg     1.9     12-09    TPro  5.7[L]  /  Alb  2.5[L]  /  TBili  0.3  /  DBili  x   /  AST  19  /  ALT  9[L]  /  AlkPhos  92  12-09       Urinalysis Basic - ( 09 Dec 2024 05:30 )    Color: x / Appearance: x / SG: x / pH: x  Gluc: 88 mg/dL / Ketone: x  / Bili: x / Urobili: x   Blood: x / Protein: x / Nitrite: x   Leuk Esterase: x / RBC: x / WBC x   Sq Epi: x / Non Sq Epi: x / Bacteria: x        RADIOLOGY & ADDITIONAL STUDIES:  Imaging:  Reviewed      GOC discussion:  [facility] Geriatrics and Palliative Care  Yamilet Milligan, Geriatrics & Palliative Care NP  Contact Info: Call [phone] (HEAL Line) or message on Microsoft Teams    HPI:  79y year old Male with a PMHx significant for hepatitis C s/p liver transplant in 3/2019 for cryptogenic cirrhosis (on sirolimus for 5 years), MGUS, unspecified autonomic nervous system disorder w/ hx of orthostasis, COPD, Mycobacterium avium complex lung disease, S/P 8 weeks on rx in 2023, D/C'd d/t AEs, chronic anemia, amaurosis fugax, Crohn's disease, BPH, and unspecified pancreatic insufficiency p/ED on 12/2 w/fall w/head-strike, wo LOC W/  in the field. In ED found to have hypertensive emergency w/ /127 w/ encephalopathy, improved to baseline now, and S.Na-118, hypotonic hypovolemic hyponatremia, improved to 133 today, treated w/ hypertonic saline. Sirolimus level 2.7 and hepatology consulted for sirolimus dosing. Failed bedside dysphagia, NG Tube 12/4-12/5, FEES done, SLP recommended soft and bite sized diet, pt able to tolerate PO. Pt tested Coronavirus OC+, on aerobika bid, Pulm consulted for Chronic NDAIR, initially statrted on vancomycin+azithromycin+zosyn, de-escalated to zosyn after reviewing [facility] records, advised to fu outpt pulm. Pt currently HDS, being stepped down to RMF 12/6. (03 Dec 2024 03:31)      PAST MEDICAL & SURGICAL HISTORY:  Chronic obstructive bronchitis  Other ascis  Cirrhosis  Spleen enlarged  Crohn disease  H/O squamous cell carcinoma  Carotid artery disease  H/O right hemicolectomy  Performed in 1976  S/P thoracotomy  removal of benign granuloma    FAMILY HISTORY:  No pertinent family history in first degree relatives     Reviewed; no history of     in mother or father    Opiate Naive (Y/N):   iStop reviewed (Y/N): Yes.   No Rx found on iStop review (Ref#:           Items that are not checked are not present  PSYCHOSOCIAL ASSESSMENT:  - Significant other/partner: [  ]  Children: [  ]  - Living Situation: Home [ x ] Long term care [  ]  Rehab[  ]  Other[  ]  - Support system: strong[ x ] adequate[  ] inadequate[  ]  - Confucianism/Spiritual practice: deferred   - Role of organized Spiritism important [  ] some [  ] unable to assess dt pt mentation [  ]  - Coping: well[  ]  with difficulty[ x ]  poor coping[  ]  unable to assess dt pt mentation [  ]    ADVANCE DIRECTIVES:    - MOLST[  ]     - Living Will [  ]     DECISION MAKER(s):  - Health Care Proxy(s) [  ]  Surrogate(s)[  ] Guardian[  ]           Name(s)/Phone Number(s):   - Wife Kami Suarez would be surrogate decision maker in the absence of a documented HCP    BASELINE (I)ADLs (prior to admission):  - [place]:  Total[  ] Moderate[  ] Dependent[  ]    Allergies    NSAIDs (Pruritus)  Aleve (Unknown)    Intolerances        Medications:      MEDICATIONS  (STANDING):  albuterol/ipratropium for Nebulization 3 milliLiter(s) Nebulizer every 6 hours  aspirin  chewable 81 milliGRAM(s) Oral daily  atorvastatin 80 milliGRAM(s) Oral at bedtime  buPROPion XL (24-Hour) . 150 milliGRAM(s) Oral daily  carvedilol 3.125 milliGRAM(s) Oral every 12 hours  colchicine 0.3 milliGRAM(s) Oral every 24 hours  famotidine    Tablet 20 milliGRAM(s) Oral <User Schedule>  finasteride 5 milliGRAM(s) Oral every 24 hours  fluticasone propionate/ salmeterol 100-50 MICROgram(s) Diskus 1 Dose(s) Inhalation two times a day  heparin   Injectable 5000 Unit(s) SubCutaneous every 8 hours  hydrALAZINE 25 milliGRAM(s) Oral every 8 hours  isosorbide   dinitrate Tablet (ISORDIL) 10 milliGRAM(s) Oral three times a day  loperamide 2 milliGRAM(s) Oral three times a day  losartan 100 milliGRAM(s) Oral every 24 hours  naphazoline/pheniramine Solution 2 Drop(s) Both EYES two times a day  pancrelipase  (CREON 36,000 Lipase Units) 3 Capsule(s) Oral three times a day with meals  piperacillin/tazobactam IVPB.. 4.5 Gram(s) IV Intermittent every 8 hours  sodium chloride 3%  Inhalation 4 milliLiter(s) Inhalation every 12 hours  tamsulosin 0.4 milliGRAM(s) Oral every 24 hours  tiotropium 2.5 MICROgram(s) Inhaler 2 Puff(s) Inhalation daily    MEDICATIONS  (PRN):  acetaminophen     Tablet .. 650 milliGRAM(s) Oral every 6 hours PRN Mild Pain (1 - 3), Moderate Pain (4 - 6)  aluminum hydroxide/magnesium hydroxide/simethicone Suspension 30 milliLiter(s) Oral every 4 hours PRN Dyspepsia  ondansetron    Tablet 4 milliGRAM(s) Oral every 8 hours PRN Nausea and/or Vomiting  sodium chloride 0.65% Nasal 1 Spray(s) Both Nostrils two times a day PRN Nasal Congestion      24 hour PRN use:      acetaminophen     Tablet ..   650 milliGRAM(s) Oral (12-08-24 @ 19:49)    acetaminophen     Tablet ..   650 milliGRAM(s) Oral (12-09-24 @ 17:12)    buPROPion XL (24-Hour) .   150 milliGRAM(s) Oral (12-09-24 @ 11:43)        PRESENT SYMPTOMS:   [ ] Patient unable to self report   Source if other than patient:  [ ]Family   [ ]Team     Pain [  X]  Location :      R knee pain   Quality: pressure, ahce   Radiation: no  Timing: intermittent   Aggravating factors:  Current score (0-10 scale): 2/10  Improves with: no relief at present     If [  ], pt denies symptom.   Dyspnea:         [  ] improving   Anxiety:           [  ]  Difficulty sleeping: [  ]  Fatigue:           [ x ]  Nausea:           [  ]  Loss of appetite:     [  ] fluctuates   Dysphagia: [  x] NG Tube 12/4-12/5, FEES done, SLP recommended soft and bite sized diet, pt able to tolerate PO  Constipation:   [  ]        Other Symptoms: cough- improving. chronic diarrhea     All other review of systems negative [  x]    ECOG Performance:     2/3  Current Palliative Performance Scale/Karnofsky Score:   60 %  Preadmit Karnofsky: 70   %          PEx:  Constitutional: older man sitting up in bed resting comfortably in bed; NAD  AAOx3;   HEENT: NC/AT, no significant temporal wasting, MMM  Respiratory: +rhonchi in all fields, good effort   Cardiac: +S1/S2; RRR; no M/R/G  Gastrointestinal: soft, NT/ND; no rebound or guarding; +BSx4  Extremities: WWP, no clubbing or cyanosis; no peripheral edema  Musculoskeletal: +swelling of L elbow +swelling of L knee, though no erythema   Vascular: 2+ radial, DP/PT pulses B/L  Neurologic: responding to questions appropriately, no focal deficits    T(C): 36.4 (12-09-24 @ 13:28), Max: 36.7 (12-09-24 @ 05:49)  HR: 101 (12-09-24 @ 13:28) (80 - 101)  BP: 126/73 (12-09-24 @ 13:28) (126/73 - 162/106)  RR: 17 (12-09-24 @ 13:28) (17 - 19)  SpO2: 97% (12-09-24 @ 13:28) (97% - 98%)  Wt(kg): --    Labs:    CBC:                        10.7   5.94  )-----------( 194      ( 09 Dec 2024 05:30 )             35.0     CMP:    12-09    135  |  105  |  20  ----------------------------<  88  4.1   |  19[L]  |  2.18[H]    Ca    9.2      09 Dec 2024 05:30  Phos  2.8     12-09  Mg     1.9     12-09    TPro  5.7[L]  /  Alb  2.5[L]  /  TBili  0.3  /  DBili  x   /  AST  19  /  ALT  9[L]  /  AlkPhos  92  12-09       Urinalysis Basic - ( 09 Dec 2024 05:30 )    Color: x / Appearance: x / SG: x / pH: x  Gluc: 88 mg/dL / Ketone: x  / Bili: x / Urobili: x   Blood: x / Protein: x / Nitrite: x   Leuk Esterase: x / RBC: x / WBC x   Sq Epi: x / Non Sq Epi: x / Bacteria: x      RADIOLOGY & ADDITIONAL STUDIES:  Imaging:  Reviewed  < from: CT Head No Cont (12.08.24 @ 10:47) >    FINDINGS:    INTRA-AXIAL: No intracranial mass effect, acute hemorrhage, midline shift   or acute transcortical infarct is seen. There are periventricular white   matter hypodensities that are nonspecific in nature but may reflect   chronic ischemic microvascular disease.  EXTRA-AXIAL: No extra-axial fluid collection is present.  VENTRICLES AND SULCI: Parenchymal volume is commensurate with patient   age. No hydrocephalus.  VISUALIZED SINUSES: Mild mucosal thickening.  VISUALIZED MASTOIDS: Clear.  CALVARIUM: Normal.  MISCELLANEOUS: Previously visualized cystic pituitary lesion is not seen   on the current CT examination.      IMPRESSION:    No evidence of acute intracranial hemorrhage, midline shift or CT   evidence of acute territorial infarct.    If the patient's symptoms persist, consider short interval follow-up head   CT or brain MRI if there are no MRI contraindications.    < from: MR Head w/wo IV Cont (12.07.24 @ 14:32) >    FINDINGS:  There is no abnormal intraparenchymal or leptomeningeal enhancement.    There is moderate diffuse parenchymal volume loss.    There are nonspecific T2 prolongation signal abnormalities in the   periventricular subcortical white matter likely related to mild chronic   microvascular ischemic changes.    There is no acute parenchymal hemorrhage, parenchymal mass, mass effect   or midline shift. There is no extra-axial fluid collection.  There is no   hydrocephalus.  There is no acute infarct.    Incidental 9 x 6 mm cystic lesion noted in the anterior pituitary gland    Mucosal thickening paranasal sinuses    IMPRESSION:  No intracranial metastasis.    Incidental subcentimeter cystic lesion in the pituitary gland. Correlate   clinically with pituitary enzymes.    No acute intracranial hemorrhage or acute infarct            GOC discussion:

## 2024-12-09 NOTE — PROGRESS NOTE ADULT - PROBLEM SELECTOR PLAN 2
Resolved  Na+ 118 on presentation. Normal sodium in early November. Serum osmolality 260, urine osmolality 283, urine sodium 57. Hypovolemic on physical exam: dry MM and decreased skin turgor.  Etiology: Hypovolemic hyponatremia most likely in setting of chronic diarrhea after NADIR rx  - Na 133 12/6. 135 12/8  - Hypertonic saline w/ goal Na increase of 6-8meq over 24h  - continue to monitor w/ Q4H BMP and q12h urine studies  - fu nephrology recs  -TSH and AM cortisol WNL  - strict I and O Presentation: iWOB, recent sick contact: wife (ROBI), BiPAP ---->NC. Home meds: Advair, Spiriva, hypertonic saline. Does not use home O2.   CT showing b/l cavitary+solid lung lesion concerning for primary lung malignancy, RUL s/p wedge resection, cannot exclude TB or fungal etiology w/ small left pleural effusion and Diffuse interstitial pulmonary edema.    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

## 2024-12-09 NOTE — PROGRESS NOTE ADULT - PROBLEM SELECTOR PLAN 12
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

## 2024-12-09 NOTE — PROGRESS NOTE ADULT - PROBLEM SELECTOR PLAN 4
Patient presented with an elevated troponin to 115. Tachycardic on presentation. EKG with no ischemic changes, Bedside POCUS showing no wall motion abnormalities. Elevated troponin in the setting of demand ischemia and CKD.    - trend troponin to peak Resolved  Na+ 118 on presentation. Normal sodium in early November. Serum osmolality 260, urine osmolality 283, urine sodium 57. Hypovolemic on physical exam: dry MM and decreased skin turgor.  Etiology: Hypovolemic hyponatremia most likely in setting of chronic diarrhea after NADIR rx  - Na 133 12/6. 135 12/8  - Hypertonic saline w/ goal Na increase of 6-8meq over 24h  - continue to monitor w/ Q4H BMP and q12h urine studies  - fu nephrology recs  -TSH and AM cortisol WNL  - strict I and O

## 2024-12-09 NOTE — PROGRESS NOTE ADULT - ASSESSMENT
79M with PMH of cryptogenic cirrhosis (s/p liver transplant in 3/2019, on sirolimus for 5 years), unspecified autonomic nervous system disorder w/ hx of orthostasis, COPD, Mycobacterium avium complex lung disease, chronic anemia, amaurosis fugax, Crohn's disease, BPH, and unspecified pancreatic insufficiency, who first presented for a fall at home, admitted to [facility] for possible stroke work-up and to rule-out TB. Hepatology consulted for sirolimus dosing. LFTs currently within normal limits.     Recommendations:   - continue sirolimus 2 mg PO daily   - review with pharmacy any drug to drug interactions between colchicine and sirolimus   - monitor renal function and check Cr daily   - rest of care per medicine team   - trend CBC and CMP daily     Case discussed with Dr. Kendrick. Hepatology Team will continue to follow.     Emilia Duffy D.O.   Gastroenterology Fellow  Weekday 7am-5pm Pager: [phone]  Weeknights/Weekend/Holiday Coverage: Please call the  for contact info

## 2024-12-09 NOTE — CONSULT NOTE ADULT - CONVERSATION DETAILS
GOC 12/6 noted.    Met with patient and introduced the role of palliative medicine for ACP, GOC, and support. Patient acknowledges his overall medical frailty and is struggling to cope with his complicated hospital course. He worries about further deconditioning and living with burden of distressing symptoms. Patient deferring further comprehensive discussion about GOC and ACP until his wife, Kami, is at bedside. He is amenable to further discussion tomorrow. Time spent providing support.

## 2024-12-09 NOTE — PROGRESS NOTE ADULT - PROBLEM SELECTOR PLAN 8
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 Home sirolimus 2mg QD.   - c/w sirolimus 2mg QD  -f/u level tomorrow AM   -hepatology following Patient with a hx of CKD, Hep C, Liver tx. Not currently getting dialysis. Baseline Cr 2.74 in early November.   UA- Protein>1000, RBC-26 represents a GN w/ nephritic range proteinuria    [facility] my chart accessed on daughters phone  - 11/21/2024 KFLC-272.2, LFLC- 28.4. K/L- 9.58, m-spike+, IgG-1089, iGa-211, iGm-72  - 11/21/2024  Uric acid-  6.7, LDH- 185  - 6/3/2024- Renal biopsy-                          - H&E- (1) segmental and global glomerulosclerosis, (2) diffuse global glomerulosclerosis (63%), w/ moderate to marked tubular atrophy & interstitial fibrosis w/ marked arterial and arteriolar sclerosis                         - LM - 16/24 glomeruli: globally sclerotic or show chronic hypoperfusion, 6/24: mild to moderate mesangial prominence, 2/24: segmental sclerosis, several bhavik show segmental "double contour" formation                         - IF - 4/6 globally sclerotic, Igm, Kappa, Lambda: trace granular mesagngial staining, IgG, IgA, C1q: no stain, 1+ C3- along tubular BM and in vessels, Casts- bitypic for both light chains. albumin neg, Fibrin neg                         - EM- 1/2 segmental sclerosis w/ subepithelial reparative changes as well as globular deposits consistent w/ hyaline w/ focal foot effacement.  GBM thickness mildly increaed, Subendothelial space:  no electron lucent expansion, No dense deposits. Mesangium: small electron dense deposits. Foci of mesangial interposition. No amyloid.   - 5/7/2024- iPTH- 112  - (3/2024) 24H Urine protein -1710, 75.2% paraprot excretion/24h  - 2/27/2024- 24H urine- 5-HIAA- 3.6 (WNL), Metanephrines- 35 (), normetanephrine- 122 (156-729), MPO neg, PR3 neg    - fu GN workup: Autoimmune (LAWANDA, ANCA, C3, C4, Cryoglobulin, anti-GBM ds, dsDNA), infection (HIV, Hep B, Hep C),   - continue to monitor  - avoid nephrotoxic agents

## 2024-12-09 NOTE — PROGRESS NOTE ADULT - PROBLEM SELECTOR PLAN 6
- Treated for Mac for 8 weeks in summer 2023 but d/c'd given side effects mary anne with rifampin including diarrhea and orthostasis which are still ongoing. Patient presented with an elevated troponin to 115. Tachycardic on presentation. EKG with no ischemic changes, Bedside POCUS showing no wall motion abnormalities. Elevated troponin in the setting of demand ischemia and CKD.    - trend troponin to peak

## 2024-12-09 NOTE — PROGRESS NOTE ADULT - PROBLEM SELECTOR PLAN 1
Hypertensive Urgency with Encephalopathy (IMPROVING)  Patient presented to ED with blood pressure 200/127. S/p nicardipine gtt and lasix 60mg IV in ED. Blood pressure lowered by presentation to floor to 149/92. No longer encephalopathic. Elevated blood pressure could be 2/2 pain w/ head strike. p/w fall w/ garbled speech w/ /140 in the field and 200/127 in ED, managed w/ cardine drip initially and currently on labetolol IV PRN. Plasma renin activity and aldosterone level WNL    [facility] my chart accessed on daughters phone  - aldosterone <1, plasma renin 0.511    - Reduce BP by 25% IN 24 hrs  - ctm vitals  - US Abdomen w/ doppler: NO SUKHJINDER,   - Plasma renin activity and aldosterone level WNL    Plan   - continue to monitor blood pressure   -increased losartan to 100mg QD  -avoid CCB due to HFrEF Home med: allopurinol.   Holding iso acute gout crises in R knee. Started 0.3 mg colchicine for 4 days   -lidocaine patch over R knee

## 2024-12-09 NOTE — PROGRESS NOTE ADULT - PROBLEM SELECTOR PLAN 11
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 Could be 2/2 anemia of chronic disease or iron deficiency given CKD.   - continue to monitor

## 2024-12-09 NOTE — PROGRESS NOTE ADULT - SUBJECTIVE AND OBJECTIVE BOX
INTERVAL HPI/OVERNIGHT EVENTS:  Patient was seen and examined at bedside. As per nurse and patient, no o/n events, patient resting comfortably. No complaints at this time. Patient denies: fever, chills, dizziness, weakness, HA, Changes in vision, CP, palpitations, SOB, cough, N/V/D/C, dysuria, changes in bowel movements, LE edema. ROS otherwise negative.    VITAL SIGNS:  T(F): 97.7 (12-09-24 @ 06:07)  HR: 95 (12-09-24 @ 06:07)  BP: 133/81 (12-09-24 @ 06:07)  RR: 19 (12-09-24 @ 06:07)  SpO2: 98% (12-09-24 @ 06:07)  Wt(kg): --      12-08-24 @ 07:01  -  12-09-24 @ 07:00  --------------------------------------------------------  IN: 0 mL / OUT: 100 mL / NET: -100 mL        PHYSICAL EXAM:    Constitutional: resting comfortably in bed; NAD  HEENT: NC/AT, PERRL, EOMI, anicteric sclera, no nasal discharge; uvula midline, no oropharyngeal erythema or exudates; MMM  Neck: supple; no JVD or thyromegaly  Respiratory: CTA B/L; no W/R/R, no retractions  Cardiac: +S1/S2; RRR; no M/R/G; PMI non-displaced  Gastrointestinal: soft, NT/ND; no rebound or guarding; +BSx4  Genitourinary: normal external genitalia  Back: spine midline, no bony tenderness or step-offs; no CVAT B/L  Extremities: WWP, no clubbing or cyanosis; no peripheral edema  Musculoskeletal: NROM x4; no joint swelling, tenderness or erythema  Vascular: 2+ radial, DP/PT pulses B/L  Dermatologic: skin warm, dry and intact; no rashes, wounds, or scars  Lymphatic: no submandibular or cervical LAD  Neurologic: AAOx3; CNII-XII grossly intact; no focal deficits  Psychiatric: affect and characteristics of appearance, verbalizations, behaviors are appropriate, denies SI/HI/AH/VH    MEDICATIONS  (STANDING):  albuterol/ipratropium for Nebulization 3 milliLiter(s) Nebulizer every 6 hours  aspirin  chewable 81 milliGRAM(s) Oral daily  atorvastatin 80 milliGRAM(s) Oral at bedtime  buPROPion XL (24-Hour) . 150 milliGRAM(s) Oral daily  carvedilol 3.125 milliGRAM(s) Oral every 12 hours  famotidine    Tablet 20 milliGRAM(s) Oral <User Schedule>  finasteride 5 milliGRAM(s) Oral every 24 hours  fluticasone propionate/ salmeterol 100-50 MICROgram(s) Diskus 1 Dose(s) Inhalation two times a day  heparin   Injectable 5000 Unit(s) SubCutaneous every 8 hours  hydrALAZINE 25 milliGRAM(s) Oral every 8 hours  isosorbide   dinitrate Tablet (ISORDIL) 10 milliGRAM(s) Oral three times a day  loperamide 2 milliGRAM(s) Oral three times a day  losartan 100 milliGRAM(s) Oral every 24 hours  naphazoline/pheniramine Solution 2 Drop(s) Both EYES two times a day  pancrelipase  (CREON 36,000 Lipase Units) 3 Capsule(s) Oral three times a day with meals  piperacillin/tazobactam IVPB.. 4.5 Gram(s) IV Intermittent every 8 hours  sodium chloride 3%  Inhalation 4 milliLiter(s) Inhalation every 12 hours  tamsulosin 0.4 milliGRAM(s) Oral every 24 hours  tiotropium 2.5 MICROgram(s) Inhaler 2 Puff(s) Inhalation daily    MEDICATIONS  (PRN):  acetaminophen     Tablet .. 650 milliGRAM(s) Oral every 6 hours PRN Mild Pain (1 - 3), Moderate Pain (4 - 6)  aluminum hydroxide/magnesium hydroxide/simethicone Suspension 30 milliLiter(s) Oral every 4 hours PRN Dyspepsia  ondansetron    Tablet 4 milliGRAM(s) Oral every 8 hours PRN Nausea and/or Vomiting  sodium chloride 0.65% Nasal 1 Spray(s) Both Nostrils two times a day PRN Nasal Congestion      Allergies    NSAIDs (Pruritus)  Aleve (Unknown)    Intolerances        LABS:                        10.7   5.94  )-----------( 194      ( 09 Dec 2024 05:30 )             35.0     12-09    135  |  105  |  x   ----------------------------<  x   4.1   |  x   |  x     Ca    9.0      08 Dec 2024 10:00  Phos  2.8     12-09  Mg     1.9     12-09    TPro  5.7[L]  /  Alb  2.9[L]  /  TBili  0.4  /  DBili  x   /  AST  12  /  ALT  9[L]  /  AlkPhos  92  12-08      Urinalysis Basic - ( 08 Dec 2024 10:00 )    Color: x / Appearance: x / SG: x / pH: x  Gluc: 123 mg/dL / Ketone: x  / Bili: x / Urobili: x   Blood: x / Protein: x / Nitrite: x   Leuk Esterase: x / RBC: x / WBC x   Sq Epi: x / Non Sq Epi: x / Bacteria: x        RADIOLOGY & ADDITIONAL TESTS:  Reviewed INTERVAL HPI/OVERNIGHT EVENTS:  Patient was seen and examined at bedside. Reports improving breathing. No headache, CP, SOB. Concerned about R knee pain     VITAL SIGNS:  T(F): 97.7 (12-09-24 @ 06:07)  HR: 95 (12-09-24 @ 06:07)  BP: 133/81 (12-09-24 @ 06:07)  RR: 19 (12-09-24 @ 06:07)  SpO2: 98% (12-09-24 @ 06:07)  Wt(kg): --      12-08-24 @ 07:01  -  12-09-24 @ 07:00  --------------------------------------------------------  IN: 0 mL / OUT: 100 mL / NET: -100 mL        PHYSICAL EXAM:    Constitutional: resting comfortably in bed; NAD  Respiratory: mild rhonchi  Cardiac: +S1/S2; RRR; no M/R/G  Gastrointestinal: soft, NT/ND; no rebound or guarding; +BSx4  Genitourinary: +Dias   Extremities: WWP, no clubbing or cyanosis; no peripheral edema  Vascular: 2+ radial, DP/PT pulses B/L  Neurologic: AAOx3; CNII-XII grossly intact; no focal deficits      MEDICATIONS  (STANDING):  albuterol/ipratropium for Nebulization 3 milliLiter(s) Nebulizer every 6 hours  aspirin  chewable 81 milliGRAM(s) Oral daily  atorvastatin 80 milliGRAM(s) Oral at bedtime  buPROPion XL (24-Hour) . 150 milliGRAM(s) Oral daily  carvedilol 3.125 milliGRAM(s) Oral every 12 hours  famotidine    Tablet 20 milliGRAM(s) Oral <User Schedule>  finasteride 5 milliGRAM(s) Oral every 24 hours  fluticasone propionate/ salmeterol 100-50 MICROgram(s) Diskus 1 Dose(s) Inhalation two times a day  heparin   Injectable 5000 Unit(s) SubCutaneous every 8 hours  hydrALAZINE 25 milliGRAM(s) Oral every 8 hours  isosorbide   dinitrate Tablet (ISORDIL) 10 milliGRAM(s) Oral three times a day  loperamide 2 milliGRAM(s) Oral three times a day  losartan 100 milliGRAM(s) Oral every 24 hours  naphazoline/pheniramine Solution 2 Drop(s) Both EYES two times a day  pancrelipase  (CREON 36,000 Lipase Units) 3 Capsule(s) Oral three times a day with meals  piperacillin/tazobactam IVPB.. 4.5 Gram(s) IV Intermittent every 8 hours  sodium chloride 3%  Inhalation 4 milliLiter(s) Inhalation every 12 hours  tamsulosin 0.4 milliGRAM(s) Oral every 24 hours  tiotropium 2.5 MICROgram(s) Inhaler 2 Puff(s) Inhalation daily    MEDICATIONS  (PRN):  acetaminophen     Tablet .. 650 milliGRAM(s) Oral every 6 hours PRN Mild Pain (1 - 3), Moderate Pain (4 - 6)  aluminum hydroxide/magnesium hydroxide/simethicone Suspension 30 milliLiter(s) Oral every 4 hours PRN Dyspepsia  ondansetron    Tablet 4 milliGRAM(s) Oral every 8 hours PRN Nausea and/or Vomiting  sodium chloride 0.65% Nasal 1 Spray(s) Both Nostrils two times a day PRN Nasal Congestion      Allergies    NSAIDs (Pruritus)  Aleve (Unknown)    Intolerances        LABS:                        10.7   5.94  )-----------( 194      ( 09 Dec 2024 05:30 )             35.0     12-09    135  |  105  |  x   ----------------------------<  x   4.1   |  x   |  x     Ca    9.0      08 Dec 2024 10:00  Phos  2.8     12-09  Mg     1.9     12-09    TPro  5.7[L]  /  Alb  2.9[L]  /  TBili  0.4  /  DBili  x   /  AST  12  /  ALT  9[L]  /  AlkPhos  92  12-08      Urinalysis Basic - ( 08 Dec 2024 10:00 )    Color: x / Appearance: x / SG: x / pH: x  Gluc: 123 mg/dL / Ketone: x  / Bili: x / Urobili: x   Blood: x / Protein: x / Nitrite: x   Leuk Esterase: x / RBC: x / WBC x   Sq Epi: x / Non Sq Epi: x / Bacteria: x        RADIOLOGY & ADDITIONAL TESTS:  Reviewed

## 2024-12-09 NOTE — PROGRESS NOTE ADULT - PROBLEM SELECTOR PLAN 3
[facility] my chart accessed on daughters phone  - Myocardial SPECT-  LVEF 44%, EKG- SR, rare PVC (11/2024)  - Tc-99 PYP amyloid scan- negative for TTR amyloid (10/29/2024)  - US Heart- small pericardial effusion (9/5/2024)  - [id number] (9/22/2024)    Patient with elevated BNP to 22,740. Patient wife reports hx of elevated BNP to 35k. Bedside POCUS showing an EF of ˜45%, no wall motion abnormalities, w/ dilated IVC on echo. Patient is s/p lasix 60mg IV in ED. Follows at [facility] cardiology.   - EF- 35%  - Strict Is&Os   - f/u formal TTE  - hold on additional diuresis for now Hypertensive Urgency with Encephalopathy (IMPROVING)  Patient presented to ED with blood pressure 200/127. S/p nicardipine gtt and lasix 60mg IV in ED. Blood pressure lowered by presentation to floor to 149/92. No longer encephalopathic. Elevated blood pressure could be 2/2 pain w/ head strike. p/w fall w/ garbled speech w/ /140 in the field and 200/127 in ED, managed w/ cardine drip initially and currently on labetolol IV PRN. Plasma renin activity and aldosterone level WNL    [facility] my chart accessed on daughters phone  - aldosterone <1, plasma renin 0.511    - Reduce BP by 25% IN 24 hrs  - ctm vitals  - US Abdomen w/ doppler: NO SUKHJINDER,   - Plasma renin activity and aldosterone level WNL    Plan   - continue to monitor blood pressure   -increased losartan to 100mg QD  -avoid CCB due to HFrEF

## 2024-12-10 LAB
FOLATE SERPL-MCNC: 14.1 NG/ML — SIGNIFICANT CHANGE UP
FUNGITELL: <31 PG/ML — SIGNIFICANT CHANGE UP
VIT B12 SERPL-MCNC: >2000 PG/ML — HIGH (ref 232–1245)

## 2024-12-10 PROCEDURE — 99233 SBSQ HOSP IP/OBS HIGH 50: CPT | Mod: GC

## 2024-12-10 RX ORDER — ALLOPURINOL 300 MG/1
50 TABLET ORAL
Refills: 0 | Status: DISCONTINUED | OUTPATIENT
Start: 2024-12-10 | End: 2024-12-13

## 2024-12-10 RX ORDER — SODIUM BICARBONATE 84 MG/ML
650 INJECTION, SOLUTION INTRAVENOUS EVERY 8 HOURS
Refills: 0 | Status: DISCONTINUED | OUTPATIENT
Start: 2024-12-10 | End: 2024-12-11

## 2024-12-10 RX ADMIN — PIPERACILLIN SODIUM AND TAZOBACTAM SODIUM 25 GRAM(S): 4; .5 INJECTION, POWDER, LYOPHILIZED, FOR SOLUTION INTRAVENOUS at 13:08

## 2024-12-10 RX ADMIN — Medication 2 DROP(S): at 19:23

## 2024-12-10 RX ADMIN — Medication 5000 UNIT(S): at 07:30

## 2024-12-10 RX ADMIN — Medication 150 MILLIGRAM(S): at 11:27

## 2024-12-10 RX ADMIN — SODIUM BICARBONATE 650 MILLIGRAM(S): 84 INJECTION, SOLUTION INTRAVENOUS at 15:55

## 2024-12-10 RX ADMIN — FLUTICASONE PROPIONATE AND SALMETEROL XINAFOATE 1 DOSE(S): 45; 21 AEROSOL, METERED RESPIRATORY (INHALATION) at 07:33

## 2024-12-10 RX ADMIN — HYDRALAZINE HYDROCHLORIDE 25 MILLIGRAM(S): 10 TABLET ORAL at 22:24

## 2024-12-10 RX ADMIN — Medication 5000 UNIT(S): at 19:23

## 2024-12-10 RX ADMIN — ACETAMINOPHEN 500MG 650 MILLIGRAM(S): 500 TABLET, COATED ORAL at 19:28

## 2024-12-10 RX ADMIN — Medication 3 CAPSULE(S): at 13:09

## 2024-12-10 RX ADMIN — Medication 2 MILLIGRAM(S): at 15:42

## 2024-12-10 RX ADMIN — Medication 0.3 MILLIGRAM(S): at 11:28

## 2024-12-10 RX ADMIN — Medication 81 MILLIGRAM(S): at 11:27

## 2024-12-10 RX ADMIN — Medication 5000 UNIT(S): at 22:23

## 2024-12-10 RX ADMIN — Medication 5 MILLIGRAM(S): at 13:09

## 2024-12-10 RX ADMIN — ISOSORBIDE DINITRATE 10 MILLIGRAM(S): 40 TABLET ORAL at 15:55

## 2024-12-10 RX ADMIN — FAMOTIDINE 20 MILLIGRAM(S): 20 TABLET, FILM COATED ORAL at 13:08

## 2024-12-10 RX ADMIN — SODIUM CHLORIDE 4 MILLILITER(S): 9 INJECTION, SOLUTION INTRAMUSCULAR; INTRAVENOUS; SUBCUTANEOUS at 21:24

## 2024-12-10 RX ADMIN — IPRATROPIUM BROMIDE AND ALBUTEROL SULFATE 3 MILLILITER(S): 2.5; .5 SOLUTION RESPIRATORY (INHALATION) at 13:08

## 2024-12-10 RX ADMIN — Medication 3 CAPSULE(S): at 09:01

## 2024-12-10 RX ADMIN — Medication 2 MILLIGRAM(S): at 07:30

## 2024-12-10 RX ADMIN — Medication 2 MILLIGRAM(S): at 22:23

## 2024-12-10 RX ADMIN — FAMOTIDINE 20 MILLIGRAM(S): 20 TABLET, FILM COATED ORAL at 19:23

## 2024-12-10 RX ADMIN — HYDRALAZINE HYDROCHLORIDE 25 MILLIGRAM(S): 10 TABLET ORAL at 13:09

## 2024-12-10 RX ADMIN — ACETAMINOPHEN 500MG 650 MILLIGRAM(S): 500 TABLET, COATED ORAL at 07:29

## 2024-12-10 RX ADMIN — SODIUM BICARBONATE 650 MILLIGRAM(S): 84 INJECTION, SOLUTION INTRAVENOUS at 22:25

## 2024-12-10 RX ADMIN — IPRATROPIUM BROMIDE AND ALBUTEROL SULFATE 3 MILLILITER(S): 2.5; .5 SOLUTION RESPIRATORY (INHALATION) at 06:52

## 2024-12-10 RX ADMIN — CARVEDILOL 3.12 MILLIGRAM(S): 25 TABLET, FILM COATED ORAL at 07:30

## 2024-12-10 RX ADMIN — Medication 2 DROP(S): at 06:50

## 2024-12-10 RX ADMIN — SIROLIMUS 2 MILLIGRAM(S): 1 TABLET ORAL at 06:49

## 2024-12-10 RX ADMIN — HYDRALAZINE HYDROCHLORIDE 25 MILLIGRAM(S): 10 TABLET ORAL at 07:30

## 2024-12-10 RX ADMIN — PIPERACILLIN SODIUM AND TAZOBACTAM SODIUM 25 GRAM(S): 4; .5 INJECTION, POWDER, LYOPHILIZED, FOR SOLUTION INTRAVENOUS at 06:52

## 2024-12-10 RX ADMIN — TAMSULOSIN HYDROCHLORIDE 0.4 MILLIGRAM(S): 0.4 CAPSULE ORAL at 19:23

## 2024-12-10 RX ADMIN — SODIUM CHLORIDE 4 MILLILITER(S): 9 INJECTION, SOLUTION INTRAMUSCULAR; INTRAVENOUS; SUBCUTANEOUS at 13:08

## 2024-12-10 RX ADMIN — LOSARTAN POTASSIUM 100 MILLIGRAM(S): 100 TABLET, FILM COATED ORAL at 15:42

## 2024-12-10 RX ADMIN — ISOSORBIDE DINITRATE 10 MILLIGRAM(S): 40 TABLET ORAL at 07:30

## 2024-12-10 RX ADMIN — CARVEDILOL 3.12 MILLIGRAM(S): 25 TABLET, FILM COATED ORAL at 19:57

## 2024-12-10 RX ADMIN — Medication 3 CAPSULE(S): at 19:30

## 2024-12-10 RX ADMIN — Medication 80 MILLIGRAM(S): at 22:25

## 2024-12-10 RX ADMIN — ISOSORBIDE DINITRATE 10 MILLIGRAM(S): 40 TABLET ORAL at 11:27

## 2024-12-10 RX ADMIN — PIPERACILLIN SODIUM AND TAZOBACTAM SODIUM 25 GRAM(S): 4; .5 INJECTION, POWDER, LYOPHILIZED, FOR SOLUTION INTRAVENOUS at 22:26

## 2024-12-10 NOTE — PROGRESS NOTE ADULT - PROBLEM SELECTOR PLAN 6
Patient presented with an elevated troponin to 115. Tachycardic on presentation. EKG with no ischemic changes, Bedside POCUS showing no wall motion abnormalities. Elevated troponin in the setting of demand ischemia and CKD.    - trend troponin to peak  -stable, no further monitoring

## 2024-12-10 NOTE — PROGRESS NOTE ADULT - ASSESSMENT
79M with PMH of cryptogenic cirrhosis (s/p liver transplant in 3/2019, on sirolimus for 5 years), unspecified autonomic nervous system disorder w/ hx of orthostasis, COPD, Mycobacterium avium complex lung disease, chronic anemia, amaurosis fugax, Crohn's disease, BPH, and unspecified pancreatic insufficiency, who first presented for a fall at home, admitted to [facility] for possible stroke work-up and to rule-out TB. Hepatology consulted for sirolimus dosing. LFTs currently within normal limits.     Recommendations:   - continue sirolimus 2 mg PO daily   - review with pharmacy any drug to drug interactions between colchicine and sirolimus   - monitor renal function and check Cr daily   - rest of care per medicine team   - trend CBC and CMP daily     Case discussed with Dr. Holliday. Hepatology Team will continue to follow.     Emilia Duffy D.O.   Gastroenterology Fellow  Weekday 7am-5pm Pager: [phone]  Weeknights/Weekend/Holiday Coverage: Please call the  for contact info

## 2024-12-10 NOTE — PROGRESS NOTE ADULT - SUBJECTIVE AND OBJECTIVE BOX
Hepatology Consult Progress Note:     OVERNIGHT EVENTS: HILARY.    SUBJECTIVE / INTERVAL HPI:   Patient seen and examined at bedside. States that he feels well but still has poor PO appetite.       VITAL SIGNS:  Vital Signs Last 24 Hrs  T(C): 36.3 (10 Dec 2024 15:49), Max: 36.7 (10 Dec 2024 07:06)  T(F): 97.3 (10 Dec 2024 15:49), Max: 98 (10 Dec 2024 07:06)  HR: 85 (10 Dec 2024 15:49) (72 - 92)  BP: 143/90 (10 Dec 2024 15:49) (104/66 - 150/94)  BP(mean): --  RR: 16 (10 Dec 2024 15:49) (16 - 18)  SpO2: 96% (10 Dec 2024 15:49) (96% - 98%)    Parameters below as of 10 Dec 2024 15:49  Patient On (Oxygen Delivery Method): room air      12-09-24 @ 07:01  -  12-10-24 @ 07:00  --------------------------------------------------------  IN: 0 mL / OUT: 1250 mL / NET: -1250 mL    12-10-24 @ 07:01  -  12-10-24 @ 16:04  --------------------------------------------------------  IN: 0 mL / OUT: 900 mL / NET: -900 mL      PHYSICAL EXAM:  General: No acute distress, +thin, frail, elderly   Lungs: Normal respiratory effort and no intercostal retractions  Cardiovascular: RRR  Abdomen: Soft, non-tender, non-distended  Neurological: Alert and oriented x3  Skin: Warm and dry. No obvious rash      MEDICATIONS:  MEDICATIONS  (STANDING):  albuterol/ipratropium for Nebulization 3 milliLiter(s) Nebulizer every 6 hours  allopurinol 50 milliGRAM(s) Oral <User Schedule>  aspirin  chewable 81 milliGRAM(s) Oral daily  atorvastatin 80 milliGRAM(s) Oral at bedtime  buPROPion XL (24-Hour) . 150 milliGRAM(s) Oral daily  carvedilol 3.125 milliGRAM(s) Oral every 12 hours  colchicine 0.3 milliGRAM(s) Oral every 24 hours  famotidine    Tablet 20 milliGRAM(s) Oral <User Schedule>  finasteride 5 milliGRAM(s) Oral every 24 hours  fluticasone propionate/ salmeterol 100-50 MICROgram(s) Diskus 1 Dose(s) Inhalation two times a day  heparin   Injectable 5000 Unit(s) SubCutaneous every 8 hours  hydrALAZINE 25 milliGRAM(s) Oral every 8 hours  isosorbide   dinitrate Tablet (ISORDIL) 10 milliGRAM(s) Oral three times a day  loperamide 2 milliGRAM(s) Oral three times a day  losartan 100 milliGRAM(s) Oral every 24 hours  naphazoline/pheniramine Solution 2 Drop(s) Both EYES two times a day  pancrelipase  (CREON 36,000 Lipase Units) 3 Capsule(s) Oral three times a day with meals  piperacillin/tazobactam IVPB.. 4.5 Gram(s) IV Intermittent every 8 hours  sirolimus 2 milliGRAM(s) Oral every 24 hours  sodium bicarbonate 650 milliGRAM(s) Oral every 8 hours  sodium chloride 3%  Inhalation 4 milliLiter(s) Inhalation every 12 hours  tamsulosin 0.4 milliGRAM(s) Oral every 24 hours  tiotropium 2.5 MICROgram(s) Inhaler 2 Puff(s) Inhalation daily    MEDICATIONS  (PRN):  acetaminophen     Tablet .. 650 milliGRAM(s) Oral every 6 hours PRN Mild Pain (1 - 3), Moderate Pain (4 - 6)  aluminum hydroxide/magnesium hydroxide/simethicone Suspension 30 milliLiter(s) Oral every 4 hours PRN Dyspepsia  ondansetron    Tablet 4 milliGRAM(s) Oral every 8 hours PRN Nausea and/or Vomiting  sodium chloride 0.65% Nasal 1 Spray(s) Both Nostrils two times a day PRN Nasal Congestion      ALLERGIES:  Allergies    NSAIDs (Pruritus)  Aleve (Unknown)    Intolerances        LABS:                        9.9    5.73  )-----------( 181      ( 10 Dec 2024 10:26 )             32.0     12-10    133[L]  |  104  |  22  ----------------------------<  107[H]  4.5   |  20[L]  |  2.41[H]    Ca    8.9      10 Dec 2024 10:26  Phos  3.2     12-10  Mg     1.8     12-10    TPro  5.7[L]  /  Alb  2.5[L]  /  TBili  0.3  /  DBili  x   /  AST  19  /  ALT  9[L]  /  AlkPhos  92  12-09      Urinalysis Basic - ( 10 Dec 2024 10:26 )    Color: x / Appearance: x / SG: x / pH: x  Gluc: 107 mg/dL / Ketone: x  / Bili: x / Urobili: x   Blood: x / Protein: x / Nitrite: x   Leuk Esterase: x / RBC: x / WBC x   Sq Epi: x / Non Sq Epi: x / Bacteria: x      CAPILLARY BLOOD GLUCOSE  RADIOLOGY & ADDITIONAL TESTS: Reviewed.

## 2024-12-10 NOTE — PROGRESS NOTE ADULT - PROBLEM SELECTOR PLAN 1
Home med: allopurinol.   Holding iso acute gout crises in R knee. Started 0.3 mg colchicine for 4 days   -lidocaine patch over R knee  - c/w colchicine  - pain well controlled on examination today

## 2024-12-10 NOTE — PROGRESS NOTE ADULT - PROBLEM SELECTOR PLAN 10
Elevated Alk Phos to 148 on admission. Likely elevated in the setting of pancreatic insufficiency. Pt has chronic diarrhea.     [facility] my chart accessed on daughters phone  12/22/2023 MRI/MRCP abdomen w/ wo IV con: few pancreatic cystic lesions w/ indolent growth 1.6cm within pancreatic tail. no suspicious internal features, no main uct dilation. No solid mass  - C-Peptide- 10.4 (range 1.1-4.4)  - Fructosamine: 338 (0-285)  - low fecal elastatse 10/2023  - CMV+ 10/2023  - GGT 33   - c/w home Creon   - c/w home loperamide

## 2024-12-10 NOTE — PROGRESS NOTE ADULT - ASSESSMENT
79y yo M PMHx hepatitis C s/p liver transplant in 3/2019 for cryptogenic cirrhosis (on sirolimus for 5 years), unspecified autonomic nervous system disorder w/ hx of orthostasis, COPD, Mycobacterium avium complex lung disease, chronic anemia, amaurosis fugax, Crohn's disease, BPH, and unspecified pancreatic insufficiency. Patient presented to the ED on 12/2 after having fallen at home with a head-strike, without loss of consciousness. Found to be hyponatremic and have hypertensive urgency. Palliative consulted to discuss goals of care and treatment preferences in the setting of complex medical history.     GOC with patient at bedside and extensive time spent providing support. Reviewed ACP including Living Will which outlines his wishes to allow natural death in event of card/resp arrest. Verbal consent obtained to complete MOLST reflecting these wishes. His wife, Kami, is his reported HCP.     He worries about progressive deconditioning brought about by this hospitalization. He has discussed starting an appetite stimulant with his transplant team at [facility] and wants to avoid adding any new medications unless they are recommended/cleared by his PCP, Dr. Lopez, or his liver transplant MD, Dr. Lopez. He is frustrated with his hospital course and feels that "no one is listening" to him, specifically re: scheduling his home medications, namely his sirolimus.     For his chronic diarrhea, he has chronically been taking imodium QID per his MD recommendations, which is reportedly effective. Would recommend/consider re instating and adding a bulking agent to his diet as well, can also consider nutrition consult.     What is most important to patient is maintaining physical strength and functional independence, engaging in meaningful activities, and spending time with loved ones, including his granddaughter Norma.       -- full note to follow

## 2024-12-10 NOTE — PROGRESS NOTE ADULT - SUBJECTIVE AND OBJECTIVE BOX
Patient is a 79y old  Male who presents with a chief complaint of hypertensive urgency (09 Dec 2024 21:35)    OVERNIGHT EVENTS: ashley    SUBJECTIVE: patient endorses diffuse, mild body pains consistent from prior but well controlled. no fevers, nausea, vomiting, diarrhea.     ROS: otherwise negative      T(C): 36.6 (12-10-24 @ 09:30), Max: 36.7 (12-10-24 @ 07:06)  HR: 80 (12-10-24 @ 09:30) (80 - 101)  BP: 104/66 (12-10-24 @ 09:30) (104/66 - 159/101)  RR: 16 (12-10-24 @ 09:30) (16 - 18)  SpO2: 96% (12-10-24 @ 09:30) (96% - 98%)  Wt(kg): --Vital Signs Last 24 Hrs  T(C): 36.6 (10 Dec 2024 09:30), Max: 36.7 (10 Dec 2024 07:06)  T(F): 97.9 (10 Dec 2024 09:30), Max: 98 (10 Dec 2024 07:06)  HR: 80 (10 Dec 2024 09:30) (80 - 101)  BP: 104/66 (10 Dec 2024 09:30) (104/66 - 159/101)  BP(mean): --  RR: 16 (10 Dec 2024 09:30) (16 - 18)  SpO2: 96% (10 Dec 2024 09:30) (96% - 98%)    Parameters below as of 10 Dec 2024 09:30  Patient On (Oxygen Delivery Method): room air        PHYSICAL EXAM:    Constitutional: resting comfortably in bed; NAD  Respiratory: CTAB, no evidence of rhonchi on examination this morning. No increased WOB. No pleurisy.   Cardiac: +S1/S2; RRR; no M/R/G  Gastrointestinal: soft, NT/ND; no rebound or guarding; +BSx4  Genitourinary: +Dias   Extremities: WWP, no clubbing or cyanosis; no peripheral edema  Vascular: 2+ radial, DP/PT pulses B/L  Neurologic: AAOx3; CNII-XII grossly intact; no focal deficits    LABS:                        9.9    5.73  )-----------( 181      ( 10 Dec 2024 10:26 )             32.0     12-10    133[L]  |  104  |  22  ----------------------------<  107[H]  4.5   |  20[L]  |  2.41[H]    Ca    8.9      10 Dec 2024 10:26  Phos  3.2     12-10  Mg     1.8     12-10    TPro  5.7[L]  /  Alb  2.5[L]  /  TBili  0.3  /  DBili  x   /  AST  19  /  ALT  9[L]  /  AlkPhos  92  12-09    Iron 70, TIBC 209, %Sat 33, Ferritin 832/ 12-09-24 @ 05:30      Urinalysis Basic - ( 10 Dec 2024 10:26 )    Color: x / Appearance: x / SG: x / pH: x  Gluc: 107 mg/dL / Ketone: x  / Bili: x / Urobili: x   Blood: x / Protein: x / Nitrite: x   Leuk Esterase: x / RBC: x / WBC x   Sq Epi: x / Non Sq Epi: x / Bacteria: x      CAPILLARY BLOOD GLUCOSE            Urinalysis Basic - ( 10 Dec 2024 10:26 )    Color: x / Appearance: x / SG: x / pH: x  Gluc: 107 mg/dL / Ketone: x  / Bili: x / Urobili: x   Blood: x / Protein: x / Nitrite: x   Leuk Esterase: x / RBC: x / WBC x   Sq Epi: x / Non Sq Epi: x / Bacteria: x        MEDICATIONS  (STANDING):  albuterol/ipratropium for Nebulization 3 milliLiter(s) Nebulizer every 6 hours  aspirin  chewable 81 milliGRAM(s) Oral daily  atorvastatin 80 milliGRAM(s) Oral at bedtime  buPROPion XL (24-Hour) . 150 milliGRAM(s) Oral daily  carvedilol 3.125 milliGRAM(s) Oral every 12 hours  colchicine 0.3 milliGRAM(s) Oral every 24 hours  famotidine    Tablet 20 milliGRAM(s) Oral <User Schedule>  finasteride 5 milliGRAM(s) Oral every 24 hours  fluticasone propionate/ salmeterol 100-50 MICROgram(s) Diskus 1 Dose(s) Inhalation two times a day  heparin   Injectable 5000 Unit(s) SubCutaneous every 8 hours  hydrALAZINE 25 milliGRAM(s) Oral every 8 hours  isosorbide   dinitrate Tablet (ISORDIL) 10 milliGRAM(s) Oral three times a day  loperamide 2 milliGRAM(s) Oral three times a day  losartan 100 milliGRAM(s) Oral every 24 hours  naphazoline/pheniramine Solution 2 Drop(s) Both EYES two times a day  pancrelipase  (CREON 36,000 Lipase Units) 3 Capsule(s) Oral three times a day with meals  piperacillin/tazobactam IVPB.. 4.5 Gram(s) IV Intermittent every 8 hours  sirolimus 2 milliGRAM(s) Oral every 24 hours  sodium chloride 3%  Inhalation 4 milliLiter(s) Inhalation every 12 hours  tamsulosin 0.4 milliGRAM(s) Oral every 24 hours  tiotropium 2.5 MICROgram(s) Inhaler 2 Puff(s) Inhalation daily    MEDICATIONS  (PRN):  acetaminophen     Tablet .. 650 milliGRAM(s) Oral every 6 hours PRN Mild Pain (1 - 3), Moderate Pain (4 - 6)  aluminum hydroxide/magnesium hydroxide/simethicone Suspension 30 milliLiter(s) Oral every 4 hours PRN Dyspepsia  ondansetron    Tablet 4 milliGRAM(s) Oral every 8 hours PRN Nausea and/or Vomiting  sodium chloride 0.65% Nasal 1 Spray(s) Both Nostrils two times a day PRN Nasal Congestion      RADIOLOGY & ADDITIONAL TESTS: Reviewed

## 2024-12-10 NOTE — PROGRESS NOTE ADULT - SUBJECTIVE AND OBJECTIVE BOX
[facility]  [facility] NP  Contact Info: Call [phone] (HEAL Line) or message on Microsoft Teams    SUBJECTIVE/OBJECTIVE:    ALLERGIES: NSAIDs (Pruritus)  Aleve (Unknown)      MEDICATIONS: REVIEWED  MEDICATIONS  (STANDING):  albuterol/ipratropium for Nebulization 3 milliLiter(s) Nebulizer every 6 hours  aspirin  chewable 81 milliGRAM(s) Oral daily  atorvastatin 80 milliGRAM(s) Oral at bedtime  buPROPion XL (24-Hour) . 150 milliGRAM(s) Oral daily  carvedilol 3.125 milliGRAM(s) Oral every 12 hours  colchicine 0.3 milliGRAM(s) Oral every 24 hours  famotidine    Tablet 20 milliGRAM(s) Oral <User Schedule>  finasteride 5 milliGRAM(s) Oral every 24 hours  fluticasone propionate/ salmeterol 100-50 MICROgram(s) Diskus 1 Dose(s) Inhalation two times a day  heparin   Injectable 5000 Unit(s) SubCutaneous every 8 hours  hydrALAZINE 25 milliGRAM(s) Oral every 8 hours  isosorbide   dinitrate Tablet (ISORDIL) 10 milliGRAM(s) Oral three times a day  loperamide 2 milliGRAM(s) Oral three times a day  losartan 100 milliGRAM(s) Oral every 24 hours  naphazoline/pheniramine Solution 2 Drop(s) Both EYES two times a day  pancrelipase  (CREON 36,000 Lipase Units) 3 Capsule(s) Oral three times a day with meals  piperacillin/tazobactam IVPB.. 4.5 Gram(s) IV Intermittent every 8 hours  sirolimus 2 milliGRAM(s) Oral every 24 hours  sodium chloride 3%  Inhalation 4 milliLiter(s) Inhalation every 12 hours  tamsulosin 0.4 milliGRAM(s) Oral every 24 hours  tiotropium 2.5 MICROgram(s) Inhaler 2 Puff(s) Inhalation daily    MEDICATIONS  (PRN):  acetaminophen     Tablet .. 650 milliGRAM(s) Oral every 6 hours PRN Mild Pain (1 - 3), Moderate Pain (4 - 6)  aluminum hydroxide/magnesium hydroxide/simethicone Suspension 30 milliLiter(s) Oral every 4 hours PRN Dyspepsia  ondansetron    Tablet 4 milliGRAM(s) Oral every 8 hours PRN Nausea and/or Vomiting  sodium chloride 0.65% Nasal 1 Spray(s) Both Nostrils two times a day PRN Nasal Congestion        Analgesic Use (Scheduled and PRNs) for past 24 hours (6a-6a):      acetaminophen     Tablet ..   650 milliGRAM(s) Oral (12-10-24 @ 07:29)    acetaminophen     Tablet ..   650 milliGRAM(s) Oral (12-09-24 @ 17:12)    buPROPion XL (24-Hour) .   150 milliGRAM(s) Oral (12-10-24 @ 11:27)          T(C): 36.6 (12-10-24 @ 09:30), Max: 36.7 (12-10-24 @ 07:06)  HR: 72 (12-10-24 @ 13:00) (72 - 92)  BP: 150/75 (12-10-24 @ 13:00) (104/66 - 150/94)  RR: 16 (12-10-24 @ 09:30) (16 - 18)  SpO2: 96% (12-10-24 @ 09:30) (96% - 98%)  Wt(kg): --    CRITICAL CARE:  [ ]Shock Present  [ ]Septic [ ]Cardiogenic [ ]Neurologic [ ]Hypovolemic    [ ]Vasopressors [ ]Inotropes    [ ]Respiratory failure present   [ ]Mechanical Ventilation   [  ]Non-invasive ventilatory support   [ ]High-Flow  [ ]Acute  [ ]Chronic   [ ]Hypoxic  [ ]Hypercarbic   [ ]Other  [ ]Other organ failure     LABS: REVIEWED  CBC:                        9.9    5.73  )-----------( 181      ( 10 Dec 2024 10:26 )             32.0     CMP:    12-10    133[L]  |  104  |  22  ----------------------------<  107[H]  4.5   |  20[L]  |  2.41[H]    Ca    8.9      10 Dec 2024 10:26  Phos  3.2     12-10  Mg     1.8     12-10    TPro  5.7[L]  /  Alb  2.5[L]  /  TBili  0.3  /  DBili  x   /  AST  19  /  ALT  9[L]  /  AlkPhos  92  12-09      RADIOLOGY & ADDITIONAL STUDIES:     DISCUSSION OF CASE: Family - to provide updates and emotional support; Primary Team and RN - to discuss plan of care    CARE COORDINATION:

## 2024-12-10 NOTE — PROGRESS NOTE ADULT - PROBLEM SELECTOR PLAN 8
Patient with a hx of CKD, Hep C, Liver tx. Not currently getting dialysis. Baseline Cr 2.74 in early November.   UA- Protein>1000, RBC-26 represents a GN w/ nephritic range proteinuria    [facility] my chart accessed on daughters phone  - 11/21/2024 KFLC-272.2, LFLC- 28.4. K/L- 9.58, m-spike+, IgG-1089, iGa-211, iGm-72  - 11/21/2024  Uric acid-  6.7, LDH- 185  - 6/3/2024- Renal biopsy-                          - H&E- (1) segmental and global glomerulosclerosis, (2) diffuse global glomerulosclerosis (63%), w/ moderate to marked tubular atrophy & interstitial fibrosis w/ marked arterial and arteriolar sclerosis                         - LM - 16/24 glomeruli: globally sclerotic or show chronic hypoperfusion, 6/24: mild to moderate mesangial prominence, 2/24: segmental sclerosis, several bhavik show segmental "double contour" formation                         - IF - 4/6 globally sclerotic, Igm, Kappa, Lambda: trace granular mesagngial staining, IgG, IgA, C1q: no stain, 1+ C3- along tubular BM and in vessels, Casts- bitypic for both light chains. albumin neg, Fibrin neg                         - EM- 1/2 segmental sclerosis w/ subepithelial reparative changes as well as globular deposits consistent w/ hyaline w/ focal foot effacement.  GBM thickness mildly increaed, Subendothelial space:  no electron lucent expansion, No dense deposits. Mesangium: small electron dense deposits. Foci of mesangial interposition. No amyloid.   - 5/7/2024- iPTH- 112  - (3/2024) 24H Urine protein -1710, 75.2% paraprot excretion/24h  - 2/27/2024- 24H urine- 5-HIAA- 3.6 (WNL), Metanephrines- 35 (), normetanephrine- 122 (156-729), MPO neg, PR3 neg    - fu GN workup: Autoimmune (LAWANDA, ANCA, C3, C4, Cryoglobulin, anti-GBM ds, dsDNA), infection (HIV, Hep B, Hep C),   - continue to monitor  - avoid nephrotoxic agents

## 2024-12-10 NOTE — PROGRESS NOTE ADULT - PROBLEM SELECTOR PLAN 3
Hypertensive Urgency with Encephalopathy (IMPROVING)  Patient presented to ED with blood pressure 200/127. S/p nicardipine gtt and lasix 60mg IV in ED. Blood pressure lowered by presentation to floor to 149/92. No longer encephalopathic. Elevated blood pressure could be 2/2 pain w/ head strike. p/w fall w/ garbled speech w/ /140 in the field and 200/127 in ED, managed w/ cardine drip initially and currently on labetolol IV PRN. Plasma renin activity and aldosterone level WNL    [facility] my chart accessed on daughters phone  - aldosterone <1, plasma renin 0.511    - Reduce BP by 25% IN 24 hrs  - ctm vitals  - US Abdomen w/ doppler: NO SUKHJINDER,   - Plasma renin activity and aldosterone level WNL    Plan   - continue to monitor blood pressure   -increased losartan to 100mg QD  -avoid CCB due to HFrEF

## 2024-12-10 NOTE — PROGRESS NOTE ADULT - PROBLEM SELECTOR PLAN 12
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

## 2024-12-10 NOTE — PROGRESS NOTE ADULT - PROBLEM SELECTOR PLAN 9
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 Home sirolimus 2mg QD.   - c/w sirolimus 2mg QD  - most recent level - 3.4. no changes indicated at this time, f/u hepatology recs

## 2024-12-10 NOTE — PROGRESS NOTE ADULT - PROBLEM SELECTOR PLAN 2
Presentation: iWOB, recent sick contact: wife (ROBI), BiPAP ---->NC. Home meds: Advair, Spiriva, hypertonic saline. Does not use home O2.   CT showing b/l cavitary+solid lung lesion concerning for primary lung malignancy, RUL s/p wedge resection, cannot exclude TB or fungal etiology w/ small left pleural effusion and Diffuse interstitial pulmonary edema.   12/10 - mild rhonchi on examination but overall much improved from prior.     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

## 2024-12-10 NOTE — PROGRESS NOTE ADULT - PROBLEM SELECTOR PLAN 4
Resolved  Na+ 118 on presentation. Normal sodium in early November. Serum osmolality 260, urine osmolality 283, urine sodium 57. Hypovolemic on physical exam: dry MM and decreased skin turgor.  Etiology: Hypovolemic hyponatremia most likely in setting of chronic diarrhea after NADIR rx  - Na 133 12/6. 135 12/8  - Hypertonic saline w/ goal Na increase of 6-8meq over 24h  - continue to monitor w/ Q4H BMP and q12h urine studies  - fu nephrology recs  -TSH and AM cortisol WNL  - strict I and O

## 2024-12-10 NOTE — PROGRESS NOTE ADULT - PROBLEM SELECTOR PLAN 5
[facility] my chart accessed on daughters phone  - Myocardial SPECT-  LVEF 44%, EKG- SR, rare PVC (11/2024)  - Tc-99 PYP amyloid scan- negative for TTR amyloid (10/29/2024)  - US Heart- small pericardial effusion (9/5/2024)  - pRO-bnp-2080 (9/22/2024)    Patient with elevated BNP to 22,740. Patient wife reports hx of elevated BNP to 35k. Bedside POCUS showing an EF of ˜45%, no wall motion abnormalities, w/ dilated IVC on echo. Patient is s/p lasix 60mg IV in ED. Follows at [facility] cardiology.   - EF- 35%  - Strict Is&Os   - 12/3 TTE - Left ventricular systolic function is moderately reduced with a calculated ejection fraction of 35% with global hypokinesis  - holding diuresis for now

## 2024-12-11 LAB
ANION GAP SERPL CALC-SCNC: 10 MMOL/L — SIGNIFICANT CHANGE UP (ref 5–17)
BASOPHILS # BLD AUTO: 0.03 K/UL — SIGNIFICANT CHANGE UP (ref 0–0.2)
BASOPHILS NFR BLD AUTO: 0.6 % — SIGNIFICANT CHANGE UP (ref 0–2)
BUN SERPL-MCNC: 22 MG/DL — SIGNIFICANT CHANGE UP (ref 7–23)
CALCIUM SERPL-MCNC: 8.9 MG/DL — SIGNIFICANT CHANGE UP (ref 8.4–10.5)
CHLORIDE SERPL-SCNC: 101 MMOL/L — SIGNIFICANT CHANGE UP (ref 96–108)
CO2 SERPL-SCNC: 22 MMOL/L — SIGNIFICANT CHANGE UP (ref 22–31)
CREAT SERPL-MCNC: 2.36 MG/DL — HIGH (ref 0.5–1.3)
EGFR: 27 ML/MIN/1.73M2 — LOW
EOSINOPHIL # BLD AUTO: 0.13 K/UL — SIGNIFICANT CHANGE UP (ref 0–0.5)
EOSINOPHIL NFR BLD AUTO: 2.4 % — SIGNIFICANT CHANGE UP (ref 0–6)
GLUCOSE SERPL-MCNC: 112 MG/DL — HIGH (ref 70–99)
HCT VFR BLD CALC: 34 % — LOW (ref 39–50)
HGB BLD-MCNC: 11.1 G/DL — LOW (ref 13–17)
IMM GRANULOCYTES NFR BLD AUTO: 0.4 % — SIGNIFICANT CHANGE UP (ref 0–0.9)
LYMPHOCYTES # BLD AUTO: 1.84 K/UL — SIGNIFICANT CHANGE UP (ref 1–3.3)
LYMPHOCYTES # BLD AUTO: 34 % — SIGNIFICANT CHANGE UP (ref 13–44)
MAGNESIUM SERPL-MCNC: 1.7 MG/DL — SIGNIFICANT CHANGE UP (ref 1.6–2.6)
MCHC RBC-ENTMCNC: 31.9 PG — SIGNIFICANT CHANGE UP (ref 27–34)
MCHC RBC-ENTMCNC: 32.6 G/DL — SIGNIFICANT CHANGE UP (ref 32–36)
MCV RBC AUTO: 97.7 FL — SIGNIFICANT CHANGE UP (ref 80–100)
MONOCYTES # BLD AUTO: 0.65 K/UL — SIGNIFICANT CHANGE UP (ref 0–0.9)
MONOCYTES NFR BLD AUTO: 12 % — SIGNIFICANT CHANGE UP (ref 2–14)
NEUTROPHILS # BLD AUTO: 2.74 K/UL — SIGNIFICANT CHANGE UP (ref 1.8–7.4)
NEUTROPHILS NFR BLD AUTO: 50.6 % — SIGNIFICANT CHANGE UP (ref 43–77)
NRBC # BLD: 0 /100 WBCS — SIGNIFICANT CHANGE UP (ref 0–0)
PHOSPHATE SERPL-MCNC: 3.6 MG/DL — SIGNIFICANT CHANGE UP (ref 2.5–4.5)
PLATELET # BLD AUTO: 168 K/UL — SIGNIFICANT CHANGE UP (ref 150–400)
POTASSIUM SERPL-MCNC: 4.2 MMOL/L — SIGNIFICANT CHANGE UP (ref 3.5–5.3)
POTASSIUM SERPL-SCNC: 4.2 MMOL/L — SIGNIFICANT CHANGE UP (ref 3.5–5.3)
RBC # BLD: 3.48 M/UL — LOW (ref 4.2–5.8)
RBC # FLD: 15.5 % — HIGH (ref 10.3–14.5)
SIROLIMUS BLD-MCNC: 4.7 NG/ML — SIGNIFICANT CHANGE UP (ref 4.5–14)
SODIUM SERPL-SCNC: 133 MMOL/L — LOW (ref 135–145)
WBC # BLD: 5.41 K/UL — SIGNIFICANT CHANGE UP (ref 3.8–10.5)
WBC # FLD AUTO: 5.41 K/UL — SIGNIFICANT CHANGE UP (ref 3.8–10.5)

## 2024-12-11 PROCEDURE — 99232 SBSQ HOSP IP/OBS MODERATE 35: CPT | Mod: GC

## 2024-12-11 RX ORDER — SODIUM BICARBONATE 84 MG/ML
650 INJECTION, SOLUTION INTRAVENOUS EVERY 8 HOURS
Refills: 0 | Status: DISCONTINUED | OUTPATIENT
Start: 2024-12-11 | End: 2024-12-13

## 2024-12-11 RX ORDER — PIPERACILLIN SODIUM AND TAZOBACTAM SODIUM 4; .5 G/20ML; G/20ML
4.5 INJECTION, POWDER, LYOPHILIZED, FOR SOLUTION INTRAVENOUS EVERY 8 HOURS
Refills: 0 | Status: DISCONTINUED | OUTPATIENT
Start: 2024-12-11 | End: 2024-12-11

## 2024-12-11 RX ORDER — HYDRALAZINE HYDROCHLORIDE 10 MG/1
25 TABLET ORAL EVERY 8 HOURS
Refills: 0 | Status: DISCONTINUED | OUTPATIENT
Start: 2024-12-11 | End: 2024-12-13

## 2024-12-11 RX ORDER — CARVEDILOL 25 MG/1
3.12 TABLET, FILM COATED ORAL EVERY 12 HOURS
Refills: 0 | Status: DISCONTINUED | OUTPATIENT
Start: 2024-12-11 | End: 2024-12-13

## 2024-12-11 RX ORDER — AMOXICILLIN/POTASSIUM CLAV 250-125 MG
1 TABLET ORAL EVERY 12 HOURS
Refills: 0 | Status: DISCONTINUED | OUTPATIENT
Start: 2024-12-11 | End: 2024-12-13

## 2024-12-11 RX ORDER — ISOSORBIDE DINITRATE 40 MG/1
10 TABLET ORAL
Refills: 0 | Status: DISCONTINUED | OUTPATIENT
Start: 2024-12-12 | End: 2024-12-13

## 2024-12-11 RX ADMIN — Medication 3 CAPSULE(S): at 11:47

## 2024-12-11 RX ADMIN — FAMOTIDINE 20 MILLIGRAM(S): 20 TABLET, FILM COATED ORAL at 09:28

## 2024-12-11 RX ADMIN — CARVEDILOL 3.12 MILLIGRAM(S): 25 TABLET, FILM COATED ORAL at 06:55

## 2024-12-11 RX ADMIN — Medication 2 MILLIGRAM(S): at 06:54

## 2024-12-11 RX ADMIN — Medication 2 MILLIGRAM(S): at 14:07

## 2024-12-11 RX ADMIN — IPRATROPIUM BROMIDE AND ALBUTEROL SULFATE 3 MILLILITER(S): 2.5; .5 SOLUTION RESPIRATORY (INHALATION) at 06:54

## 2024-12-11 RX ADMIN — IPRATROPIUM BROMIDE AND ALBUTEROL SULFATE 3 MILLILITER(S): 2.5; .5 SOLUTION RESPIRATORY (INHALATION) at 18:58

## 2024-12-11 RX ADMIN — Medication 5000 UNIT(S): at 14:07

## 2024-12-11 RX ADMIN — SODIUM BICARBONATE 650 MILLIGRAM(S): 84 INJECTION, SOLUTION INTRAVENOUS at 14:08

## 2024-12-11 RX ADMIN — HYDRALAZINE HYDROCHLORIDE 25 MILLIGRAM(S): 10 TABLET ORAL at 14:07

## 2024-12-11 RX ADMIN — ISOSORBIDE DINITRATE 10 MILLIGRAM(S): 40 TABLET ORAL at 06:57

## 2024-12-11 RX ADMIN — FAMOTIDINE 20 MILLIGRAM(S): 20 TABLET, FILM COATED ORAL at 18:58

## 2024-12-11 RX ADMIN — Medication 2 DROP(S): at 17:50

## 2024-12-11 RX ADMIN — FLUTICASONE PROPIONATE AND SALMETEROL XINAFOATE 1 DOSE(S): 45; 21 AEROSOL, METERED RESPIRATORY (INHALATION) at 19:23

## 2024-12-11 RX ADMIN — SODIUM BICARBONATE 650 MILLIGRAM(S): 84 INJECTION, SOLUTION INTRAVENOUS at 06:55

## 2024-12-11 RX ADMIN — Medication 2 PUFF(S): at 11:50

## 2024-12-11 RX ADMIN — Medication 5000 UNIT(S): at 06:54

## 2024-12-11 RX ADMIN — Medication 80 MILLIGRAM(S): at 21:23

## 2024-12-11 RX ADMIN — Medication 3 CAPSULE(S): at 09:33

## 2024-12-11 RX ADMIN — HYDRALAZINE HYDROCHLORIDE 25 MILLIGRAM(S): 10 TABLET ORAL at 06:55

## 2024-12-11 RX ADMIN — Medication 81 MILLIGRAM(S): at 11:45

## 2024-12-11 RX ADMIN — LOSARTAN POTASSIUM 100 MILLIGRAM(S): 100 TABLET, FILM COATED ORAL at 11:45

## 2024-12-11 RX ADMIN — IPRATROPIUM BROMIDE AND ALBUTEROL SULFATE 3 MILLILITER(S): 2.5; .5 SOLUTION RESPIRATORY (INHALATION) at 12:14

## 2024-12-11 RX ADMIN — Medication 5 MILLIGRAM(S): at 09:28

## 2024-12-11 RX ADMIN — Medication 1 TABLET(S): at 14:04

## 2024-12-11 RX ADMIN — Medication 3 CAPSULE(S): at 17:48

## 2024-12-11 RX ADMIN — TAMSULOSIN HYDROCHLORIDE 0.4 MILLIGRAM(S): 0.4 CAPSULE ORAL at 18:58

## 2024-12-11 RX ADMIN — IPRATROPIUM BROMIDE AND ALBUTEROL SULFATE 3 MILLILITER(S): 2.5; .5 SOLUTION RESPIRATORY (INHALATION) at 23:18

## 2024-12-11 RX ADMIN — CARVEDILOL 3.12 MILLIGRAM(S): 25 TABLET, FILM COATED ORAL at 23:18

## 2024-12-11 RX ADMIN — ISOSORBIDE DINITRATE 10 MILLIGRAM(S): 40 TABLET ORAL at 17:48

## 2024-12-11 RX ADMIN — Medication 2 DROP(S): at 07:01

## 2024-12-11 RX ADMIN — SODIUM CHLORIDE 4 MILLILITER(S): 9 INJECTION, SOLUTION INTRAMUSCULAR; INTRAVENOUS; SUBCUTANEOUS at 09:27

## 2024-12-11 RX ADMIN — ISOSORBIDE DINITRATE 10 MILLIGRAM(S): 40 TABLET ORAL at 10:30

## 2024-12-11 RX ADMIN — PIPERACILLIN SODIUM AND TAZOBACTAM SODIUM 25 GRAM(S): 4; .5 INJECTION, POWDER, LYOPHILIZED, FOR SOLUTION INTRAVENOUS at 09:29

## 2024-12-11 RX ADMIN — ACETAMINOPHEN 500MG 650 MILLIGRAM(S): 500 TABLET, COATED ORAL at 18:42

## 2024-12-11 RX ADMIN — SIROLIMUS 2 MILLIGRAM(S): 1 TABLET ORAL at 06:58

## 2024-12-11 RX ADMIN — Medication 2 MILLIGRAM(S): at 21:23

## 2024-12-11 RX ADMIN — Medication 5000 UNIT(S): at 21:23

## 2024-12-11 RX ADMIN — Medication 150 MILLIGRAM(S): at 11:45

## 2024-12-11 RX ADMIN — SODIUM CHLORIDE 4 MILLILITER(S): 9 INJECTION, SOLUTION INTRAMUSCULAR; INTRAVENOUS; SUBCUTANEOUS at 21:23

## 2024-12-11 RX ADMIN — Medication 0.3 MILLIGRAM(S): at 11:45

## 2024-12-11 RX ADMIN — HYDRALAZINE HYDROCHLORIDE 25 MILLIGRAM(S): 10 TABLET ORAL at 23:18

## 2024-12-11 RX ADMIN — FLUTICASONE PROPIONATE AND SALMETEROL XINAFOATE 1 DOSE(S): 45; 21 AEROSOL, METERED RESPIRATORY (INHALATION) at 07:01

## 2024-12-11 RX ADMIN — SODIUM BICARBONATE 650 MILLIGRAM(S): 84 INJECTION, SOLUTION INTRAVENOUS at 23:19

## 2024-12-11 NOTE — PROGRESS NOTE ADULT - PROBLEM SELECTOR PLAN 3
Hypertensive Urgency with Encephalopathy, resolved  Patient presented to ED with blood pressure 200/127. S/p nicardipine gtt and lasix 60mg IV in ED. Blood pressure lowered by presentation to floor to 149/92. No longer encephalopathic. Elevated blood pressure could be 2/2 pain w/ head strike. p/w fall w/ garbled speech w/ /140 in the field and 200/127 in ED, managed w/ cardine drip initially and currently on labetolol IV PRN. Plasma renin activity and aldosterone level WNL    [facility] my chart accessed on daughters phone  - aldosterone <1, plasma renin 0.511    - Reduce BP by 25% IN 24 hrs  - ctm vitals  - US Abdomen w/ doppler: NO SUKHJINDER,   - Plasma renin activity and aldosterone level WNL    Plan   - continue to monitor blood pressure   -increased losartan to 100mg QD  -avoid CCB due to HFrEF

## 2024-12-11 NOTE — PROGRESS NOTE ADULT - PROBLEM SELECTOR PLAN 10
Elevated Alk Phos to 148 on admission. Likely elevated in the setting of pancreatic insufficiency. Pt has chronic diarrhea.     [facility] my chart accessed on daughters phone  12/22/2023 MRI/MRCP abdomen w/ wo IV con: few pancreatic cystic lesions w/ indolent growth 1.6cm within pancreatic tail. no suspicious internal features, no main uct dilation. No solid mass  - C-Peptide- 10.4 (range 1.1-4.4)  - Fructosamine: 338 (0-285)  - low fecal elastatse 10/2023  - CMV+ 10/2023  - GGT 33   - c/w home Creon   - c/w home loperamide

## 2024-12-11 NOTE — PROGRESS NOTE ADULT - PROBLEM SELECTOR PLAN 4
Resolved  Na+ 118 on presentation. Normal sodium in early November. Serum osmolality 260, urine osmolality 283, urine sodium 57. Hypovolemic on physical exam: dry MM and decreased skin turgor.  Etiology: Hypovolemic hyponatremia most likely in setting of chronic diarrhea after NADIR rx  - Na 133 12/11  -TSH and AM cortisol WNL  - strict I and O

## 2024-12-11 NOTE — PROGRESS NOTE ADULT - PROBLEM SELECTOR PLAN 2
Presentation: iWOB, recent sick contact: wife (ROBI), BiPAP ---->NC. Home meds: Advair, Spiriva, hypertonic saline. Does not use home O2.   CT showing b/l cavitary+solid lung lesion concerning for primary lung malignancy, RUL s/p wedge resection, cannot exclude TB or fungal etiology w/ small left pleural effusion and Diffuse interstitial pulmonary edema.     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    -C/W Augmentin for total 14 day course (EOT 12/16)

## 2024-12-11 NOTE — PROGRESS NOTE ADULT - PROBLEM SELECTOR PLAN 5
[facility] my chart accessed on daughters phone  - Myocardial SPECT-  LVEF 44%, EKG- SR, rare PVC (11/2024)  - Tc-99 PYP amyloid scan- negative for TTR amyloid (10/29/2024)  - US Heart- small pericardial effusion (9/5/2024)  - pRO-bnp-2080 (9/22/2024)    Patient with elevated BNP to 22,740. Patient wife reports hx of elevated BNP to 35k. Bedside POCUS showing an EF of ˜45%, no wall motion abnormalities, w/ dilated IVC on echo. Patient is s/p lasix 60mg IV in ED. Follows at [facility] cardiology.   - EF- 35%  - Strict Is&Os   - 12/3 TTE - Left ventricular systolic function is moderately reduced with a calculated ejection fraction of 35% with global hypokinesis  - holding diuresis for now

## 2024-12-11 NOTE — PROGRESS NOTE ADULT - PROBLEM SELECTOR PLAN 8
Patient with a hx of CKD, Hep C, Liver tx. Not currently getting dialysis. Baseline Cr 2.74 in early November.   UA- Protein>1000, RBC-26 represents a GN w/ nephritic range proteinuria    [facility] my chart accessed on daughters phone  - 11/21/2024 KFLC-272.2, LFLC- 28.4. K/L- 9.58, m-spike+, IgG-1089, iGa-211, iGm-72  - 11/21/2024  Uric acid-  6.7, LDH- 185  - 6/3/2024- Renal biopsy-                          - H&E- (1) segmental and global glomerulosclerosis, (2) diffuse global glomerulosclerosis (63%), w/ moderate to marked tubular atrophy & interstitial fibrosis w/ marked arterial and arteriolar sclerosis                         - LM - 16/24 glomeruli: globally sclerotic or show chronic hypoperfusion, 6/24: mild to moderate mesangial prominence, 2/24: segmental sclerosis, several bhavik show segmental "double contour" formation                         - IF - 4/6 globally sclerotic, Igm, Kappa, Lambda: trace granular mesagngial staining, IgG, IgA, C1q: no stain, 1+ C3- along tubular BM and in vessels, Casts- bitypic for both light chains. albumin neg, Fibrin neg                         - EM- 1/2 segmental sclerosis w/ subepithelial reparative changes as well as globular deposits consistent w/ hyaline w/ focal foot effacement.  GBM thickness mildly increaed, Subendothelial space:  no electron lucent expansion, No dense deposits. Mesangium: small electron dense deposits. Foci of mesangial interposition. No amyloid.   - 5/7/2024- iPTH- 112  - (3/2024) 24H Urine protein -1710, 75.2% paraprot excretion/24h  - 2/27/2024- 24H urine- 5-HIAA- 3.6 (WNL), Metanephrines- 35 (), normetanephrine- 122 (156-729), MPO neg, PR3 neg    - fu GN workup: Autoimmune (LAWANDA, ANCA, C3, C4, Cryoglobulin, anti-GBM ds, dsDNA), infection (HIV, Hep B, Hep C),   - continue to monitor  - avoid nephrotoxic agents

## 2024-12-11 NOTE — PROGRESS NOTE ADULT - SUBJECTIVE AND OBJECTIVE BOX
HOSPITAL COURSE: 79y M PMHx of hepatitis C s/p liver transplant in 3/2019 for cryptogenic cirrhosis (on sirolimus for 5 years), unspecified autonomic nervous system disorder w/ hx of orthostasis, HfrEF (EF35%)  COPD, Mycobacterium avium complex lung disease, chronic anemia, amaurosis fugax, Crohn's disease, BPH, CKD (baseline creat 2.7) and unspecified pancreatic insufficiency. Initially admitted to  for management of hyponatremia and hypertensive urgency and flash pulmonary edema. Hyponatremia improved with duiresis and hypertonic, BP improved on current regimen and respiratory status improved s/p lasix and initiation of zosyn (patient treated with abx when has worsening of repsiratory symptoms). Patient is medically ready for discharge to [facility], pend updated PT notes.     OVERNIGHT EVENTS: Patti    SUBJECTIVE: Pt resting comfortably in bed. Reports marked improvement in right knee pain. Reports his only pain currently is a chronic skin lesion to his left nostril. Otherwise denies any symptoms    ROS: otherwise negative      T(C): 36.6 (12-11-24 @ 06:22), Max: 36.6 (12-11-24 @ 06:22)  HR: 74 (12-11-24 @ 08:30) (74 - 88)  BP: 142/78 (12-11-24 @ 08:30) (129/70 - 152/88)  RR: 16 (12-11-24 @ 08:30) (16 - 18)  SpO2: 98% (12-11-24 @ 08:30) (96% - 98%)  Wt(kg): --Vital Signs Last 24 Hrs  T(C): 36.6 (11 Dec 2024 06:22), Max: 36.6 (11 Dec 2024 06:22)  T(F): 97.9 (11 Dec 2024 06:22), Max: 97.9 (11 Dec 2024 06:22)  HR: 74 (11 Dec 2024 08:30) (74 - 88)  BP: 142/78 (11 Dec 2024 08:30) (129/70 - 152/88)  BP(mean): --  RR: 16 (11 Dec 2024 08:30) (16 - 18)  SpO2: 98% (11 Dec 2024 08:30) (96% - 98%)    Parameters below as of 11 Dec 2024 08:30  Patient On (Oxygen Delivery Method): room air        PHYSICAL EXAM:  Constitutional: resting comfortably in bed; NAD  Respiratory: CTAB, talking in full sentences No increased WOB.   Cardiac: RRR; no M/R/G  Gastrointestinal: soft, NT/ND; no rebound or guarding  Extremities: WWP, no clubbing or cyanosis; no peripheral edema  Skin: Chronic appearing lesion to right nostril. No active bleeding or oozing. No signs of infection. Multiple scattered pigmented lesions to forehead and extremities.  Vascular: 2+ radial, DP/PT pulses B/L  Neurologic: AAOx3; no focal deficits          RADIOLOGY & ADDITIONAL TESTS: Reviewed

## 2024-12-11 NOTE — PROGRESS NOTE ADULT - PROBLEM SELECTOR PLAN 1
Home med: allopurinol.   Holding allopurinol ISO active gout flair  -lidocaine patch over R knee  - c/w colchicine  - pain well controlled on examination today

## 2024-12-11 NOTE — PROGRESS NOTE ADULT - PROBLEM SELECTOR PLAN 12
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

## 2024-12-11 NOTE — PROGRESS NOTE ADULT - PROBLEM SELECTOR PLAN 6
Patient presented with an elevated troponin to 115. Tachycardic on presentation. EKG with no ischemic changes, Bedside POCUS showing no wall motion abnormalities. Elevated troponin in the setting of demand ischemia and CKD.    -stable, no further monitoring

## 2024-12-11 NOTE — PROGRESS NOTE ADULT - ASSESSMENT
79y year old Male with a PMHx significant for hepatitis C s/p liver transplant in 3/2019 for cryptogenic cirrhosis (on sirolimus for 5 years), unspecified autonomic nervous system disorder w/ hx of orthostasis, COPD, Mycobacterium avium complex lung disease, chronic anemia, amaurosis fugax, Crohn's disease, BPH, and unspecified pancreatic insufficiency. Patient presented to the ED on 12/2 after having fallen at home with a head-strike, without loss of consciousness. Found to be hyponatremic to 118 and found to have hypertensive urgency on presentation. Admitted for further medical management of blood pressure and hyponatremia. Currently stable on RMF and medically cleared for YOANA, pending approval.

## 2024-12-12 LAB — VIT B1 SERPL-MCNC: 124.6 NMOL/L — SIGNIFICANT CHANGE UP (ref 66.5–200)

## 2024-12-12 PROCEDURE — 99232 SBSQ HOSP IP/OBS MODERATE 35: CPT | Mod: GC

## 2024-12-12 RX ORDER — URSODIOL 300 MG/1
500 CAPSULE ORAL
Refills: 0 | Status: DISCONTINUED | OUTPATIENT
Start: 2024-12-12 | End: 2024-12-13

## 2024-12-12 RX ORDER — IPRATROPIUM BROMIDE AND ALBUTEROL SULFATE 2.5; .5 MG/3ML; MG/3ML
3 SOLUTION RESPIRATORY (INHALATION) EVERY 6 HOURS
Refills: 0 | Status: DISCONTINUED | OUTPATIENT
Start: 2024-12-12 | End: 2024-12-13

## 2024-12-12 RX ADMIN — FAMOTIDINE 20 MILLIGRAM(S): 20 TABLET, FILM COATED ORAL at 09:50

## 2024-12-12 RX ADMIN — Medication 3 CAPSULE(S): at 18:25

## 2024-12-12 RX ADMIN — HYDRALAZINE HYDROCHLORIDE 25 MILLIGRAM(S): 10 TABLET ORAL at 23:13

## 2024-12-12 RX ADMIN — Medication 2 MILLIGRAM(S): at 06:18

## 2024-12-12 RX ADMIN — FAMOTIDINE 20 MILLIGRAM(S): 20 TABLET, FILM COATED ORAL at 18:51

## 2024-12-12 RX ADMIN — CARVEDILOL 3.12 MILLIGRAM(S): 25 TABLET, FILM COATED ORAL at 12:04

## 2024-12-12 RX ADMIN — SODIUM BICARBONATE 650 MILLIGRAM(S): 84 INJECTION, SOLUTION INTRAVENOUS at 23:07

## 2024-12-12 RX ADMIN — SIROLIMUS 2 MILLIGRAM(S): 1 TABLET ORAL at 06:19

## 2024-12-12 RX ADMIN — IPRATROPIUM BROMIDE AND ALBUTEROL SULFATE 3 MILLILITER(S): 2.5; .5 SOLUTION RESPIRATORY (INHALATION) at 12:06

## 2024-12-12 RX ADMIN — Medication 2 PUFF(S): at 13:43

## 2024-12-12 RX ADMIN — Medication 3 CAPSULE(S): at 09:50

## 2024-12-12 RX ADMIN — TAMSULOSIN HYDROCHLORIDE 0.4 MILLIGRAM(S): 0.4 CAPSULE ORAL at 18:51

## 2024-12-12 RX ADMIN — SODIUM CHLORIDE 4 MILLILITER(S): 9 INJECTION, SOLUTION INTRAMUSCULAR; INTRAVENOUS; SUBCUTANEOUS at 09:51

## 2024-12-12 RX ADMIN — Medication 5000 UNIT(S): at 06:18

## 2024-12-12 RX ADMIN — HYDRALAZINE HYDROCHLORIDE 25 MILLIGRAM(S): 10 TABLET ORAL at 07:03

## 2024-12-12 RX ADMIN — Medication 81 MILLIGRAM(S): at 12:04

## 2024-12-12 RX ADMIN — SODIUM CHLORIDE 4 MILLILITER(S): 9 INJECTION, SOLUTION INTRAMUSCULAR; INTRAVENOUS; SUBCUTANEOUS at 21:43

## 2024-12-12 RX ADMIN — IPRATROPIUM BROMIDE AND ALBUTEROL SULFATE 3 MILLILITER(S): 2.5; .5 SOLUTION RESPIRATORY (INHALATION) at 06:18

## 2024-12-12 RX ADMIN — ACETAMINOPHEN 500MG 650 MILLIGRAM(S): 500 TABLET, COATED ORAL at 21:51

## 2024-12-12 RX ADMIN — FLUTICASONE PROPIONATE AND SALMETEROL XINAFOATE 1 DOSE(S): 45; 21 AEROSOL, METERED RESPIRATORY (INHALATION) at 06:19

## 2024-12-12 RX ADMIN — SODIUM BICARBONATE 650 MILLIGRAM(S): 84 INJECTION, SOLUTION INTRAVENOUS at 16:05

## 2024-12-12 RX ADMIN — Medication 2 DROP(S): at 06:18

## 2024-12-12 RX ADMIN — Medication 1 TABLET(S): at 16:06

## 2024-12-12 RX ADMIN — Medication 3 CAPSULE(S): at 12:03

## 2024-12-12 RX ADMIN — Medication 2 MILLIGRAM(S): at 13:44

## 2024-12-12 RX ADMIN — LOSARTAN POTASSIUM 100 MILLIGRAM(S): 100 TABLET, FILM COATED ORAL at 12:04

## 2024-12-12 RX ADMIN — Medication 2 DROP(S): at 18:49

## 2024-12-12 RX ADMIN — FLUTICASONE PROPIONATE AND SALMETEROL XINAFOATE 1 DOSE(S): 45; 21 AEROSOL, METERED RESPIRATORY (INHALATION) at 18:49

## 2024-12-12 RX ADMIN — URSODIOL 500 MILLIGRAM(S): 300 CAPSULE ORAL at 18:50

## 2024-12-12 RX ADMIN — ACETAMINOPHEN 500MG 650 MILLIGRAM(S): 500 TABLET, COATED ORAL at 04:56

## 2024-12-12 RX ADMIN — CARVEDILOL 3.12 MILLIGRAM(S): 25 TABLET, FILM COATED ORAL at 23:13

## 2024-12-12 RX ADMIN — ISOSORBIDE DINITRATE 10 MILLIGRAM(S): 40 TABLET ORAL at 12:03

## 2024-12-12 RX ADMIN — ISOSORBIDE DINITRATE 10 MILLIGRAM(S): 40 TABLET ORAL at 07:03

## 2024-12-12 RX ADMIN — Medication 80 MILLIGRAM(S): at 21:43

## 2024-12-12 RX ADMIN — Medication 5 MILLIGRAM(S): at 09:51

## 2024-12-12 RX ADMIN — Medication 5000 UNIT(S): at 13:43

## 2024-12-12 RX ADMIN — Medication 5000 UNIT(S): at 21:44

## 2024-12-12 RX ADMIN — Medication 1 TABLET(S): at 01:32

## 2024-12-12 RX ADMIN — ALLOPURINOL 50 MILLIGRAM(S): 300 TABLET ORAL at 16:05

## 2024-12-12 RX ADMIN — ISOSORBIDE DINITRATE 10 MILLIGRAM(S): 40 TABLET ORAL at 18:50

## 2024-12-12 RX ADMIN — SODIUM BICARBONATE 650 MILLIGRAM(S): 84 INJECTION, SOLUTION INTRAVENOUS at 07:03

## 2024-12-12 RX ADMIN — Medication 2 MILLIGRAM(S): at 21:44

## 2024-12-12 RX ADMIN — Medication 150 MILLIGRAM(S): at 12:02

## 2024-12-12 NOTE — PROGRESS NOTE ADULT - PROBLEM SELECTOR PLAN 9
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 Home sirolimus 2mg QD.   - c/w sirolimus 2mg QD  - no changes indicated at this time, f/u hepatology recs

## 2024-12-12 NOTE — PROGRESS NOTE ADULT - PROBLEM SELECTOR PLAN 10
Elevated Alk Phos to 148 on admission. Likely elevated in the setting of pancreatic insufficiency. Pt has chronic diarrhea.     [facility] my chart accessed on daughters phone  12/22/2023 MRI/MRCP abdomen w/ wo IV con: few pancreatic cystic lesions w/ indolent growth 1.6cm within pancreatic tail. no suspicious internal features, no main uct dilation. No solid mass  - C-Peptide- 10.4 (range 1.1-4.4)  - Fructosamine: 338 (0-285)  - low fecal elastatse 10/2023  - CMV+ 10/2023  - GGT 33   - c/w home Creon   - c/w home loperamide

## 2024-12-12 NOTE — PROGRESS NOTE ADULT - SUBJECTIVE AND OBJECTIVE BOX
HOSPITAL COURSE:     OVERNIGHT EVENTS:    SUBJECTIVE:     ROS: otherwise negative      T(C): 36.4 (12-12-24 @ 08:20), Max: 36.8 (12-11-24 @ 20:42)  HR: 90 (12-12-24 @ 08:20) (80 - 99)  BP: 108/68 (12-12-24 @ 08:20) (107/72 - 155/94)  RR: 17 (12-12-24 @ 08:20) (17 - 18)  SpO2: 94% (12-12-24 @ 08:20) (94% - 97%)  Wt(kg): --Vital Signs Last 24 Hrs  T(C): 36.4 (12 Dec 2024 08:20), Max: 36.8 (11 Dec 2024 20:42)  T(F): 97.5 (12 Dec 2024 08:20), Max: 98.2 (11 Dec 2024 20:42)  HR: 90 (12 Dec 2024 08:20) (80 - 99)  BP: 108/68 (12 Dec 2024 08:20) (107/72 - 155/94)  BP(mean): 81 (12 Dec 2024 08:20) (81 - 81)  RR: 17 (12 Dec 2024 08:20) (17 - 18)  SpO2: 94% (12 Dec 2024 08:20) (94% - 97%)    Parameters below as of 12 Dec 2024 08:20  Patient On (Oxygen Delivery Method): room air        PHYSICAL EXAM:  Constitutional: resting comfortably in bed; NAD  Head: NC/AT  Eyes: PERRL, EOMI, anicteric sclera  ENT: no nasal discharge; MMM  Neck: supple; no JVD or thyromegaly  Respiratory: CTA B/L; no W/R/R, no retractions  Cardiac: +S1/S2; RRR; no M/R/G  Gastrointestinal: soft, NT/ND; no rebound or guarding; +BSx4  Back: spine midline, no bony tenderness or step-offs; no CVAT B/L  Extremities: WWP, no clubbing or cyanosis; no peripheral edema. Capillary refill <2 sec  Musculoskeletal: NROM x4; no joint swelling, tenderness or erythema  Vascular: 2+ radial, DP/PT pulses B/L  Dermatologic: skin warm, dry and intact; no rashes, wounds, or scars  Lymphatic: no submandibular or cervical LAD  Neurologic: AAOx3; CNII-XII grossly intact; no focal deficits  Psychiatric: affect and characteristics of appearance, verbalizations, behaviors are appropriate   HOSPITAL COURSE: 79y M PMHx of hepatitis C s/p liver transplant in 3/2019 for cryptogenic cirrhosis (on sirolimus for 5 years), unspecified autonomic nervous system disorder w/ hx of orthostasis, HfrEF (EF35%)  COPD, Mycobacterium avium complex lung disease, chronic anemia, amaurosis fugax, Crohn's disease, BPH, CKD (baseline creat 2.7) and unspecified pancreatic insufficiency. Initially admitted to  for management of hyponatremia and hypertensive urgency and flash pulmonary edema. Hyponatremia improved with duiresis and hypertonic, BP improved on current regimen and respiratory status improved s/p lasix and initiation of zosyn (patient treated with abx when has worsening of repsiratory symptoms). Patient is medically ready for discharge to [facility], pend updated PT notes.     OVERNIGHT EVENTS: Patti    SUBJECTIVE: Pt reports feeling well overall, denies any pain in his right knee. Reports ongoing pain to right nostril skin lesion. Last BM yesterday and normal. Otherwise no other symptoms.    ROS: otherwise negative      T(C): 36.4 (12-12-24 @ 08:20), Max: 36.8 (12-11-24 @ 20:42)  HR: 90 (12-12-24 @ 08:20) (80 - 99)  BP: 108/68 (12-12-24 @ 08:20) (107/72 - 155/94)  RR: 17 (12-12-24 @ 08:20) (17 - 18)  SpO2: 94% (12-12-24 @ 08:20) (94% - 97%)  Wt(kg): --Vital Signs Last 24 Hrs  T(C): 36.4 (12 Dec 2024 08:20), Max: 36.8 (11 Dec 2024 20:42)  T(F): 97.5 (12 Dec 2024 08:20), Max: 98.2 (11 Dec 2024 20:42)  HR: 90 (12 Dec 2024 08:20) (80 - 99)  BP: 108/68 (12 Dec 2024 08:20) (107/72 - 155/94)  BP(mean): 81 (12 Dec 2024 08:20) (81 - 81)  RR: 17 (12 Dec 2024 08:20) (17 - 18)  SpO2: 94% (12 Dec 2024 08:20) (94% - 97%)    Parameters below as of 12 Dec 2024 08:20  Patient On (Oxygen Delivery Method): room air        PHYSICAL EXAM:  Constitutional: resting comfortably in bed; NAD  Head: NC/AT  Eyes: EOMI  Neck: supple  Respiratory: CTA B/L; no W/R/R  Cardiac: RRR; no M/R/G  Gastrointestinal: soft, NT/ND; no rebound or guarding  Extremities: WWP, no clubbing or cyanosis; no joint line tenderness to right knee  Vascular: 2+ radial, DP/PT pulses B/L  Skin: Chronic appearing lesion to right nostril. No active bleeding or oozing. No signs of infection.  Neurologic: AAOx3; no focal deficits  Psychiatric: affect and characteristics of appearance, verbalizations, behaviors are appropriate

## 2024-12-12 NOTE — PROGRESS NOTE ADULT - PROBLEM/PLAN-5
DISPLAY PLAN FREE TEXT
Spine appears normal, range of motion is not limited, no muscle or joint tenderness

## 2024-12-12 NOTE — PROGRESS NOTE ADULT - PROBLEM SELECTOR PLAN 5
[facility] my chart accessed on daughters phone  - Myocardial SPECT-  LVEF 44%, EKG- SR, rare PVC (11/2024)  - Tc-99 PYP amyloid scan- negative for TTR amyloid (10/29/2024)  - US Heart- small pericardial effusion (9/5/2024)  - pRO-bnp-2080 (9/22/2024)    Patient with elevated BNP to 22,740. Patient wife reports hx of elevated BNP to 35k. Bedside POCUS showing an EF of ˜45%, no wall motion abnormalities, w/ dilated IVC on echo. Patient is s/p lasix 60mg IV in ED. Follows at [facility] cardiology.   - EF- 35%  - Strict Is&Os   - 12/3 TTE - Left ventricular systolic function is moderately reduced with a calculated ejection fraction of 35% with global hypokinesis  - holding diuresis for now

## 2024-12-12 NOTE — PROGRESS NOTE ADULT - PROBLEM SELECTOR PROBLEM 3
Trauma I called at [phone], Dr Macie Nageotte called back at [phone], arrived in trauma room at [phone] Hypertensive urgency

## 2024-12-12 NOTE — PROGRESS NOTE ADULT - PROBLEM SELECTOR PLAN 8
Patient with a hx of CKD, Hep C, Liver tx. Not currently getting dialysis. Baseline Cr 2.74 in early November.   UA- Protein>1000, RBC-26 represents a GN w/ nephritic range proteinuria    [facility] my chart accessed on daughters phone  - 11/21/2024 KFLC-272.2, LFLC- 28.4. K/L- 9.58, m-spike+, IgG-1089, iGa-211, iGm-72  - 11/21/2024  Uric acid-  6.7, LDH- 185  - 6/3/2024- Renal biopsy-                          - H&E- (1) segmental and global glomerulosclerosis, (2) diffuse global glomerulosclerosis (63%), w/ moderate to marked tubular atrophy & interstitial fibrosis w/ marked arterial and arteriolar sclerosis                         - LM - 16/24 glomeruli: globally sclerotic or show chronic hypoperfusion, 6/24: mild to moderate mesangial prominence, 2/24: segmental sclerosis, several bhavik show segmental "double contour" formation                         - IF - 4/6 globally sclerotic, Igm, Kappa, Lambda: trace granular mesagngial staining, IgG, IgA, C1q: no stain, 1+ C3- along tubular BM and in vessels, Casts- bitypic for both light chains. albumin neg, Fibrin neg                         - EM- 1/2 segmental sclerosis w/ subepithelial reparative changes as well as globular deposits consistent w/ hyaline w/ focal foot effacement.  GBM thickness mildly increaed, Subendothelial space:  no electron lucent expansion, No dense deposits. Mesangium: small electron dense deposits. Foci of mesangial interposition. No amyloid.   - 5/7/2024- iPTH- 112  - (3/2024) 24H Urine protein -1710, 75.2% paraprot excretion/24h  - 2/27/2024- 24H urine- 5-HIAA- 3.6 (WNL), Metanephrines- 35 (), normetanephrine- 122 (156-729), MPO neg, PR3 neg    - fu GN workup: Autoimmune (LAWANDA, ANCA, C3, C4, Cryoglobulin, anti-GBM ds, dsDNA), infection (HIV, Hep B, Hep C),   - continue to monitor  - avoid nephrotoxic agents

## 2024-12-12 NOTE — PROGRESS NOTE ADULT - TIME BILLING
chart review, patient interview/exam, review of labs/imaging, management of medical conditions, counseling/educating patient, documentation; excludes teaching and separately reported services
chart review, patient interview/exam, review of labs/imaging, management of medical conditions, counseling/educating patient, documentation; excludes teaching and separately reported services
chart review, patient interview/exam, review of labs/imaging, management of medical conditions, counseling/educating patient and family, documentation; excludes teaching and separately reported services
chart review, patient interview/exam, review of labs/imaging, management of medical conditions, counseling/educating patient, documentation; excludes teaching and separately reported services

## 2024-12-12 NOTE — PROGRESS NOTE ADULT - PROBLEM SELECTOR PLAN 12
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

## 2024-12-12 NOTE — PROGRESS NOTE ADULT - PROBLEM SELECTOR PLAN 1
Home med: allopurinol.   Holding allopurinol ISO active gout flair  -lidocaine patch over R knee  -Last day of colchicine 12/12  - pain well controlled on examination today

## 2024-12-12 NOTE — PROGRESS NOTE ADULT - ATTENDING COMMENTS
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  CT from [facility] from april and september show same findings. cavity and nodules are related to NTM.  Patient does have viral illness which likely exacerbated his underlying NTM( abscessus) disease.  Continue airway clearance and zosyn.
I have reviewed patient status and discussed at length. I agree with findings and plans.
I have reviewed patient status and discussed at length. I agree with findings and plans.
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     Hypertension - previously hypotensive requiring midodrine now on multiple BP agents. Cont losartan 100mg, carvedilol 3.125mg BID, isordil 10mg TID, hydralazine 25mg TID. Close outpatient follow up.     LILIAN on CKD - appreciate renal recs, underwent renal biopsy showing advanced CKD with advanced IFTA (possible nephrosclerosis w/o evidence of EM, no c/f MGRS). Creatinine improved, renally dose medications, outpatient follow up with nephro.     Encephalopathy - resolved, stroke workup negative.     R knee pain - c/f gout (had been off allopurinol), trial really dose colchicine (0.3mg qd for up to 4d), avoid nsaid/steroids. Lidocaine patch.     Chronic HFrEF - not in acute exacerbation. Outpatient follow up for GDMT optimization and workup for etiology.     Chronic MAC - not currently on treatment, evaluated by pulm who suspect viral illness exacerbated his underlying NTM. Transitioned pip-tazo to amox-clav on discharge to complete 14 day course.     Pituitary lesion - incidental <1cm cystic lesion in anterior pituitary. Close outpatient follow up for hormonal testing and surveillance imaging.     Dispo -  [facility] pending auth
I have reviewed patient status and discussed at length. I agree with findings and plans.
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     Vitals, labs, imagine reviewed. Seen this morning sitting up in chair, no acute complaints. R knee pain improved significantly.     Hypertension - previously hypotensive requiring midodrine now on multiple BP agents. Cont losartan 100mg, carvedilol 3.125mg BID, isordil 10mg TID, hydralazine 25mg TID. Close outpatient follow up.     LILIAN on CKD - appreciate renal recs, underwent renal biopsy showing advanced CKD with advanced IFTA (possible nephrosclerosis w/o evidence of EM, no c/f MGRS). Creatinine improved, renally dose medications, outpatient follow up with nephro.     Encephalopathy - resolved, stroke workup negative.     R knee pain - c/f gout (has been off allopurinol), trial really dose colchicine (0.3mg qd for up to 4d), avoid nsaid/steroids. Lidocaine patch.     Chronic HFrEF - not in acute exacerbation. Outpatient follow up for GDMT optimization and workup for etiology.     Chronic MAC - not currently on treatment, evaluated by pulm who suspect viral illness exacerbated his underlying NTM. Transition pip-tazo to amox-clav on discharge to complete 14 day course.     Pituitary lesion - incidental <1cm cystic lesion in anterior pituitary. Close outpatient follow up for hormonal testing and surveillance imaging.     Dispo - anticipate [facility] pending acceptance and auth
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     Vitals, labs, imagine reviewed. C/o interval development of R knee pain, no other acute complaints. On exam, effusion present without overlying warmth or erythema, TTP.     Hypertension - previously hypotensive requiring midodrine now on multiple BP agents. Cont losartan 100mg, carvedilol 3.125mg BID, isordil 10mg TID, hydralazine 25mg TID. Close outpatient follow up.     LILIAN on CKD - appreciate renal recs, underwent renal biopsy showing advanced CKD with advanced IFTA (possible nephrosclerosis w/o evidence of EM, no c/f MGRS). Creatinine improved, renally dose medications, outpatient follow up with nephro.     Encephalopathy - resolved, stroke workup negative.     R knee pain - c/f gout (has been off allopurinol), trial really dose colchicine (0.3mg qd for up to 4d), avoid nsaid/steroids. Lidocaine patch.     Chronic HFrEF - not in acute exacerbation. Outpatient follow up for GDMT optimization and workup for etiology.     Chronic MAC - not currently on treatment, evaluated by pulm who suspect viral illness exacerbated his underlying NTM. Transition pip-tazo to amox-clav on discharge to complete 140day course.     Pituitary lesion - incidental <1cm cystic lesion in anterior pituitary. Close outpatient follow up for hormonal testing and surveillance imaging.     Dispo - anticipate [facility] pending acceptance and auth, plan discussed with patient and wife
79y year old Male with a PMHx significant for hepatitis C s/p liver transplant in 3/2019 for cryptogenic cirrhosis (on sirolimus for 5 years), unspecified autonomic nervous system disorder w/ hx of orthostasis, COPD, Mycobacterium avium complex lung disease, chronic anemia, amaurosis fugax, Crohn's disease, BPH, and unspecified pancreatic insufficiency. Admitted for management of hypertension, LILIAN on CKD 2/2 advanced IFTA per renal biopsy, and chronic MAC.    #HTN - better controlled, added isordil/hydral to regimen, continue  #HFrEF - euvolemic on exam, continue GDMT, will need cardiology follow up as outpatient including non urgent ischemic evaluation  #LILIAN on CKD - improving, renal biopsy showing advanced IFTA, appreciate renal reccs  #Altered mental status - initial stroke code negative, MR head yesterday without acute acute pathology, neurology following will conisder repeat CTH vs vEEG  #Chronic MAC - continue IV zosyn today, will likely transition to augmentin tomorrow to continue PO therapy as outpatient    Dispo: anticipate DC planning to begin in next 24 hours
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     Vitals, labs, imagine reviewed. C/o interval development of R knee pain. On exam, effusion present without overlying warmth or erythema, TTP.     Hypertension - previously hypotensive requiring midodrine now on multiple BP agents. Cont losartan 100mg, carvedilol 3.125mg BID, isordil 10mg TID, hydralazine 25mg TID. Close outpatient follow up.     LILIAN on CKD - appreciate renal recs, underwent renal biopsy showing advanced CKD with advanced IFTA (possible nephrosclerosis w/o evidence of EM, no c/f MGRS). Creatinine improved, renally dose medications, outpatient follow up with nephro.     Encephalopathy - resolved, stroke workup negative.     R knee pain - c/f gout (has been off allopurinol), trial really dose colchicine (0.3mg qd for up to 4d), avoid nsaid/steroids. Lidocaine patch.     Chronic HFrEF - not in acute exacerbation. Outpatient follow up for GDMT optimization and workup for etiology.     Chronic MAC - not currently on treatment, evaluated by pulm who suspect viral illness exacerbated his underlying NTM. Transition pip-tazo to amox-clav on discharge to complete 140day course.     Pituitary lesion - incidental <1cm cystic lesion in anterior pituitary. Close outpatient follow up for hormonal testing and surveillance imaging.     Dispo - anticipate [facility] pending acceptance and auth
Complex medical history as outlined above admitted for hypertensive emergency, severe hyponatremia, and encephalopathy. Overall improved but still with poorly controlled pressures. Patient previously on midodrine and now with -200s. Up-titrate losartan as tolerated, if second agent needed prefer carvedilol given HFrEF. Repeat trop to ensure downtrend, Repeat sirolimus level in AM. Rest of care as outlined above. Appreciate nephrology, hepatology, and pulm recommendations. Consult palliative, explored code status/GOC, patient wishes to discuss with his wife.

## 2024-12-12 NOTE — CHART NOTE - NSCHARTNOTEFT_GEN_A_CORE
Admitting Diagnosis:   Patient is a 79y old  Male who presents with a chief complaint of hypertensive urgency (10 Dec 2024 11:21)      PAST MEDICAL & SURGICAL HISTORY:  Chronic obstructive bronchitis  Other ascites  Cirrhosis  Spleen enlarged  Crohn disease  H/O squamous cell carcinoma  Carotid artery disease  H/O right hemicolectomy  Performed in 1976  S/P thoracotomy  removal of benign granuloma    Current Nutrition Order:   Diet, Soft and Bite Sized:   No Straws (12-04-24 @ 15:13) [Active]    PO Intake: Good (%) [ X ]  Fair (50-75%) [   ] Poor (<25%) [   ]    GI Issues: No issues with nausea, vomiting, diarrhea, constipation reported at time of visit. Last BM noted on 12/10.     Pain: 0/10 pain per flow sheets     Skin Integrity: Stage I : sacrum, right hip per flow sheets. Yaya Score: 15  Edema: +1 b/l leg, 3+ right knee per flow sheets     12-09-24 @ 07:01  -  12-10-24 @ 07:00  --------------------------------------------------------  IN: 0 mL / OUT: 1250 mL / NET: -1250 mL    12-10-24 @ 07:01  -  12-10-24 @ 14:46  --------------------------------------------------------  IN: 0 mL / OUT: 900 mL / NET: -900 mL    Labs:   12-10    133[L]  |  104  |  22  ----------------------------<  107[H]  4.5   |  20[L]  |  2.41[H]    Ca    8.9      10 Dec 2024 10:26  Phos  3.2     12-10  Mg     1.8     12-10    TPro  5.7[L]  /  Alb  2.5[L]  /  TBili  0.3  /  DBili  x   /  AST  19  /  ALT  9[L]  /  AlkPhos  92  12-09    CAPILLARY BLOOD GLUCOSE    Medications:  MEDICATIONS  (STANDING):  albuterol/ipratropium for Nebulization 3 milliLiter(s) Nebulizer every 6 hours  aspirin  chewable 81 milliGRAM(s) Oral daily  atorvastatin 80 milliGRAM(s) Oral at bedtime  buPROPion XL (24-Hour) . 150 milliGRAM(s) Oral daily  carvedilol 3.125 milliGRAM(s) Oral every 12 hours  colchicine 0.3 milliGRAM(s) Oral every 24 hours  famotidine    Tablet 20 milliGRAM(s) Oral <User Schedule>  finasteride 5 milliGRAM(s) Oral every 24 hours  fluticasone propionate/ salmeterol 100-50 MICROgram(s) Diskus 1 Dose(s) Inhalation two times a day  heparin   Injectable 5000 Unit(s) SubCutaneous every 8 hours  hydrALAZINE 25 milliGRAM(s) Oral every 8 hours  isosorbide   dinitrate Tablet (ISORDIL) 10 milliGRAM(s) Oral three times a day  loperamide 2 milliGRAM(s) Oral three times a day  losartan 100 milliGRAM(s) Oral every 24 hours  naphazoline/pheniramine Solution 2 Drop(s) Both EYES two times a day  pancrelipase  (CREON 36,000 Lipase Units) 3 Capsule(s) Oral three times a day with meals  piperacillin/tazobactam IVPB.. 4.5 Gram(s) IV Intermittent every 8 hours  sirolimus 2 milliGRAM(s) Oral every 24 hours  sodium chloride 3%  Inhalation 4 milliLiter(s) Inhalation every 12 hours  tamsulosin 0.4 milliGRAM(s) Oral every 24 hours  tiotropium 2.5 MICROgram(s) Inhaler 2 Puff(s) Inhalation daily    MEDICATIONS  (PRN):  acetaminophen     Tablet .. 650 milliGRAM(s) Oral every 6 hours PRN Mild Pain (1 - 3), Moderate Pain (4 - 6)  aluminum hydroxide/magnesium hydroxide/simethicone Suspension 30 milliLiter(s) Oral every 4 hours PRN Dyspepsia  ondansetron    Tablet 4 milliGRAM(s) Oral every 8 hours PRN Nausea and/or Vomiting  sodium chloride 0.65% Nasal 1 Spray(s) Both Nostrils two times a day PRN Nasal Congestion      Weight:  Dosing weight 12/3 : 149 pounds   IBW: 184 pounds   %IBW: 81%     Recommend obtaining updated weights when medical feasible. RD to continue to monitor weights as available.     [Severe Protein Calorie Malnutrition: Risk Assessment Completed 12/5]  - NFPE: moderate depletion: orbital, buccal, temples, clavicle    Estimated energy needs:   Kcal: 30-35 kcal/kg = 5471-4437 kcal   Pro: 1.5-2 g pro/kg = 101-135 g pro  Fluids: 30-35 ml/kg = 4888-9417 ml     Needs determined using current body weight 67.9 kg (81%IBW) adjusted for protein-calorie malnutrition.    Subjective:   "WANDER VAZQUEZ is a 79y year old Male with a PMHx significant for hepatitis C s/p liver transplant in 3/2019 for cryptogenic cirrhosis (on sirolimus for 5 years), unspecified autonomic nervous system disorder w/ hx of orthostasis, COPD, Mycobacterium avium complex lung disease, chronic anemia, amaurosis fugax, Crohn's disease, BPH, and unspecified pancreatic insufficiency. Patient presented to the ED on 12/2 after having fallen at home with a head-strike, without loss of consciousness. Found to be hyponatremic to 118 and found to have hypertensive urgency on presentation. Admitted for further medical management of blood pressure and hyponatremia. "     Attempted to visit pt at bedside for nutrition follow up, however medical team in room with pt. Information obtained though comprehensive chart review and from nurse. Current diet: soft bite sized. Per RN states that pt has a good appetite and consuming 100% of meals. States that pt is having bowel movements, with no GI distress. Last BM noted 12/10 per flow sheets. Meds reviewed. IV antibiotics, imodium, zofran, creon. Nutritionally Pertinent labs 12/5 Na: 132 (L), Creat: 2.41 (H), Gluc: 107 (H). See Nutrition recommendations below.     Previous Nutrition Diagnosis: Malnutrition severe, chronic related to suspected unable to meet at least 75% estimated energy requirements as evidenced by significant muscle/fat wasting.    Active [ X ]  Resolved [   ]    Goal: Pt will meet 75% or more of protein/energy needs via most appropriate route for nutrition and reduce/resolve s/sx protein-calorie malnutrition.    Recommendations:   1. Continue with current dier as tolerated   2. Recommend Ensure Enlive High Protein 2x/day (350 kcal, 20 g protein in each) to optimize intake.   3. Monitor PO intake, diet tolerance, weight trends, labs, and skin integrity and bowel movement regularity.   4. Encourage PO intake and honor food preferences as able unless otherwise contraindicated.   5. RDN will continue to monitor, reassess, and intervene as appropriate.     Education: deferred at this time     Risk Level: High [   ] Moderate [ X ] Low [   ]
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  Suspect proteinuria could be related to chronic IS w/ Sirolimus  Repeat cystatin C levels.     Discussed with primary team
Patient's chart reviewed.     Cr continues to be around 2.2. Patient is currently receiving 2g of sirolimus PO daily via NG tube.    Recommendations:   - please obtain sirolimus level with AM labs tomorrow   - dose recommendations for sirolimus pending AM level     Case discussed with Dr. Roger. Hepatology Team will continue to follow.     Emilia Duffy D.O.   Gastroenterology Fellow  Weekday 7am-5pm Pager: [phone]  Weeknights/Weekend/Holiday Coverage: Please call the  for contact info
Admitting Diagnosis:   Patient is a 79y old  Male who presents with a chief complaint of hypertensive urgency (12 Dec 2024 06:27)      PAST MEDICAL & SURGICAL HISTORY:  Chronic obstructive bronchitis  Other ascites  Cirrhosis  Spleen enlarged  Crohn disease  H/O squamous cell carcinoma  Carotid artery disease  H/O right hemicolectomy  Performed in 1976  S/P thoracotomy  removal of benign granuloma    Current Nutrition Order: soft and bite sized, no straws       PO Intake: Good (%) [   ]  Fair (50-75%) [  x ] Poor (<25%) [   ]    GI Issues: last BM 12/12 per chart    Pain: none documented    Skin Integrity: +1 edema to BL legs, stage 1 pressure ulcer to sacrum, stage 1 pressure ulcer to R hip      Labs:   12-11    133[L]  |  101  |  22  ----------------------------<  112[H]  4.2   |  22  |  2.36[H]    Ca    8.9      11 Dec 2024 05:30  Phos  3.6     12-11  Mg     1.7     12-11      CAPILLARY BLOOD GLUCOSE      Medications:  MEDICATIONS  (STANDING):  albuterol/ipratropium for Nebulization 3 milliLiter(s) Nebulizer every 6 hours  allopurinol 50 milliGRAM(s) Oral <User Schedule>  amoxicillin  500 milliGRAM(s)/clavulanate 1 Tablet(s) Oral every 12 hours  aspirin  chewable 81 milliGRAM(s) Oral daily  atorvastatin 80 milliGRAM(s) Oral at bedtime  buPROPion XL (24-Hour) . 150 milliGRAM(s) Oral daily  carvedilol 3.125 milliGRAM(s) Oral every 12 hours  famotidine    Tablet 20 milliGRAM(s) Oral <User Schedule>  finasteride 5 milliGRAM(s) Oral every 24 hours  fluticasone propionate/ salmeterol 100-50 MICROgram(s) Diskus 1 Dose(s) Inhalation two times a day  heparin   Injectable 5000 Unit(s) SubCutaneous every 8 hours  hydrALAZINE 25 milliGRAM(s) Oral every 8 hours  isosorbide   dinitrate Tablet (ISORDIL) 10 milliGRAM(s) Oral <User Schedule>  loperamide 2 milliGRAM(s) Oral every 8 hours  losartan 100 milliGRAM(s) Oral every 24 hours  naphazoline/pheniramine Solution 2 Drop(s) Both EYES two times a day  pancrelipase  (CREON 36,000 Lipase Units) 3 Capsule(s) Oral three times a day with meals  sirolimus 2 milliGRAM(s) Oral every 24 hours  sodium bicarbonate 650 milliGRAM(s) Oral every 8 hours  sodium chloride 3%  Inhalation 4 milliLiter(s) Inhalation every 12 hours  tamsulosin 0.4 milliGRAM(s) Oral every 24 hours  tiotropium 2.5 MICROgram(s) Inhaler 2 Puff(s) Inhalation daily    MEDICATIONS  (PRN):  acetaminophen     Tablet .. 650 milliGRAM(s) Oral every 6 hours PRN Mild Pain (1 - 3), Moderate Pain (4 - 6)  aluminum hydroxide/magnesium hydroxide/simethicone Suspension 30 milliLiter(s) Oral every 4 hours PRN Dyspepsia  ondansetron    Tablet 4 milliGRAM(s) Oral every 8 hours PRN Nausea and/or Vomiting  sodium chloride 0.65% Nasal 1 Spray(s) Both Nostrils two times a day PRN Nasal Congestion      Weight:  Dosing weight 12/3 : 149 pounds   IBW: 184 pounds   %IBW: 81%   BMI: 19.8    Weight Change: no new weights to assess. Please obtain weekly    [Severe Protein Calorie Malnutrition: Risk Assessment Completed 12/5]  - NFPE: moderate muscle and fat wasting    Estimated energy needs:   Kcal: 30-35 kcal/kg = 8176-9690 kcal   Pro: 1.5-2 g pro/kg = 101-135 g pro  Fluids: 30-35 ml/kg = 4618-4730 ml     Needs determined using current body weight 67.9 kg (81%IBW) adjusted for protein-calorie malnutrition.    Subjective:   "WANDER VAZQUEZ is a 79y year old Male with a PMHx significant for hepatitis C s/p liver transplant in 3/2019 for cryptogenic cirrhosis (on sirolimus for 5 years), unspecified autonomic nervous system disorder w/ hx of orthostasis, COPD, Mycobacterium avium complex lung disease, chronic anemia, amaurosis fugax, Crohn's disease, BPH, and unspecified pancreatic insufficiency. Patient presented to the ED on 12/2 after having fallen at home with a head-strike, without loss of consciousness. Found to be hyponatremic to 118 and found to have hypertensive urgency on presentation. Admitted for further medical management of blood pressure and hyponatremia. "     Patient seen at bedside for follow up assessment. Current diet order: soft and bite sized. Patient appeared confused upon assessment but reported good appetite. Kitchen staff communicated with RD team that at dinner last night, patient only ordered an avocado and wife was upset that this is all that came on the tray. Called wife today and obtained food preferences. All requests noted in  system. Also placed a note to hosts taking the orders that if wife is not available and patient orders minimal food, to please send a standard tray so that the patient receives adequate nutritious foods. Wife requesting for patient to receive extra snacks on the trays so that he can save them for in between meals. Will also allow for double portions of protein. SLP reassessed patient on 12/10 and continued to recommend soft and bite sized diet. Recommend to add Ensure plus high protein 2x/day. Labs: sodium 133, Cr 2.36, GFR 27. Meds: antibiotic, imodium, pepcid, zofran, Creon. RD to f/u prn.    Previous Nutrition Diagnosis: Malnutrition severe, chronic related to suspected unable to meet at least 75% estimated energy requirements as evidenced by significant muscle/fat wasting.    Active [ x  ]  Resolved [   ]    Goal: Patient to meet at least 75% of nutritional needs consistently     Recommendations:  1. Continue with soft and bite sized diet per SLP recommendations. Recommend to add Ensure Plus High Protein 2x/day (350kcal, 20g protein per serving), Magic Cup, double portions of protein. Food preferences updated in  system.  2. Encourage pt to meet nutritional needs as able   3. Monitor labs: electrolytes, cbc  4. Monitor weights   5. Pain and bowel regimen per team   6. Will continue to assess/honor food preferences as able  *communicated with team    Education: nutrition education not appropriate at this time due to patient's mental status    Risk Level: High [ x  ] Moderate [   ] Low [   ]
Neurology requesting to start patient on aspirin, flagged for allergy given NSAID allergy alert.   Discussed with patient, wife and daughter bedside.   Patient has had a rash occasionally with NSAIDs in the past, no issues breathing in the past when having had the rash with NSAIDs. In addition, patient has taken aspirin over the years recently and has not had any issues with it.
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   No concerns at present for MGRS based on most recent bx.   Proteinuria could be related to chronic IS w/ Sirolimus hence need for ARB     At the time of discharge, please arrange a non urgent follow up with primary nephrologist at [facility]      Will sign off ~ reconsult as needed

## 2024-12-12 NOTE — PROGRESS NOTE ADULT - PROBLEM SELECTOR PLAN 4
Resolved  Na+ 118 on presentation. Normal sodium in early November. Serum osmolality 260, urine osmolality 283, urine sodium 57. Hypovolemic on physical exam: dry MM and decreased skin turgor.  Etiology: Hypovolemic hyponatremia most likely in setting of chronic diarrhea after NADIR rx  -TSH and AM cortisol WNL  - strict I and O  -CTM

## 2024-12-13 ENCOUNTER — TRANSCRIPTION ENCOUNTER (OUTPATIENT)
Age: 79
End: 2024-12-13

## 2024-12-13 VITALS
RESPIRATION RATE: 18 BRPM | HEART RATE: 95 BPM | OXYGEN SATURATION: 96 % | SYSTOLIC BLOOD PRESSURE: 104 MMHG | DIASTOLIC BLOOD PRESSURE: 74 MMHG | TEMPERATURE: 97 F

## 2024-12-13 PROCEDURE — 87206 SMEAR FLUORESCENT/ACID STAI: CPT

## 2024-12-13 PROCEDURE — 86704 HEP B CORE ANTIBODY TOTAL: CPT

## 2024-12-13 PROCEDURE — 83735 ASSAY OF MAGNESIUM: CPT

## 2024-12-13 PROCEDURE — 86850 RBC ANTIBODY SCREEN: CPT

## 2024-12-13 PROCEDURE — 83880 ASSAY OF NATRIURETIC PEPTIDE: CPT

## 2024-12-13 PROCEDURE — 84484 ASSAY OF TROPONIN QUANT: CPT

## 2024-12-13 PROCEDURE — 72126 CT NECK SPINE W/DYE: CPT | Mod: MC

## 2024-12-13 PROCEDURE — 80053 COMPREHEN METABOLIC PANEL: CPT

## 2024-12-13 PROCEDURE — 76775 US EXAM ABDO BACK WALL LIM: CPT

## 2024-12-13 PROCEDURE — A9585: CPT

## 2024-12-13 PROCEDURE — 86225 DNA ANTIBODY NATIVE: CPT

## 2024-12-13 PROCEDURE — 83605 ASSAY OF LACTIC ACID: CPT

## 2024-12-13 PROCEDURE — 87015 SPECIMEN INFECT AGNT CONCNTJ: CPT

## 2024-12-13 PROCEDURE — 80048 BASIC METABOLIC PNL TOTAL CA: CPT

## 2024-12-13 PROCEDURE — 83010 ASSAY OF HAPTOGLOBIN QUANT: CPT

## 2024-12-13 PROCEDURE — 82024 ASSAY OF ACTH: CPT

## 2024-12-13 PROCEDURE — 87070 CULTURE OTHR SPECIMN AEROBIC: CPT

## 2024-12-13 PROCEDURE — 84550 ASSAY OF BLOOD/URIC ACID: CPT

## 2024-12-13 PROCEDURE — 97530 THERAPEUTIC ACTIVITIES: CPT

## 2024-12-13 PROCEDURE — 83540 ASSAY OF IRON: CPT

## 2024-12-13 PROCEDURE — 99239 HOSP IP/OBS DSCHRG MGMT >30: CPT | Mod: GC

## 2024-12-13 PROCEDURE — 82728 ASSAY OF FERRITIN: CPT

## 2024-12-13 PROCEDURE — 84540 ASSAY OF URINE/UREA-N: CPT

## 2024-12-13 PROCEDURE — 86036 ANCA SCREEN EACH ANTIBODY: CPT

## 2024-12-13 PROCEDURE — 93880 EXTRACRANIAL BILAT STUDY: CPT

## 2024-12-13 PROCEDURE — 83036 HEMOGLOBIN GLYCOSYLATED A1C: CPT

## 2024-12-13 PROCEDURE — 87040 BLOOD CULTURE FOR BACTERIA: CPT

## 2024-12-13 PROCEDURE — 97165 OT EVAL LOW COMPLEX 30 MIN: CPT

## 2024-12-13 PROCEDURE — 84305 ASSAY OF SOMATOMEDIN: CPT

## 2024-12-13 PROCEDURE — 82962 GLUCOSE BLOOD TEST: CPT

## 2024-12-13 PROCEDURE — 86709 HEPATITIS A IGM ANTIBODY: CPT

## 2024-12-13 PROCEDURE — 83721 ASSAY OF BLOOD LIPOPROTEIN: CPT

## 2024-12-13 PROCEDURE — 80195 ASSAY OF SIROLIMUS: CPT

## 2024-12-13 PROCEDURE — 92526 ORAL FUNCTION THERAPY: CPT

## 2024-12-13 PROCEDURE — 86038 ANTINUCLEAR ANTIBODIES: CPT

## 2024-12-13 PROCEDURE — 93976 VASCULAR STUDY: CPT

## 2024-12-13 PROCEDURE — 86334 IMMUNOFIX E-PHORESIS SERUM: CPT

## 2024-12-13 PROCEDURE — 70450 CT HEAD/BRAIN W/O DYE: CPT | Mod: MC

## 2024-12-13 PROCEDURE — 85025 COMPLETE CBC W/AUTO DIFF WBC: CPT

## 2024-12-13 PROCEDURE — 99291 CRITICAL CARE FIRST HOUR: CPT

## 2024-12-13 PROCEDURE — 82088 ASSAY OF ALDOSTERONE: CPT

## 2024-12-13 PROCEDURE — 82103 ALPHA-1-ANTITRYPSIN TOTAL: CPT

## 2024-12-13 PROCEDURE — 87205 SMEAR GRAM STAIN: CPT

## 2024-12-13 PROCEDURE — 83935 ASSAY OF URINE OSMOLALITY: CPT

## 2024-12-13 PROCEDURE — 92610 EVALUATE SWALLOWING FUNCTION: CPT

## 2024-12-13 PROCEDURE — 86901 BLOOD TYPING SEROLOGIC RH(D): CPT

## 2024-12-13 PROCEDURE — 71260 CT THORAX DX C+: CPT | Mod: MC

## 2024-12-13 PROCEDURE — 83521 IG LIGHT CHAINS FREE EACH: CPT

## 2024-12-13 PROCEDURE — 84165 PROTEIN E-PHORESIS SERUM: CPT

## 2024-12-13 PROCEDURE — 0225U NFCT DS DNA&RNA 21 SARSCOV2: CPT

## 2024-12-13 PROCEDURE — 82533 TOTAL CORTISOL: CPT

## 2024-12-13 PROCEDURE — 87116 MYCOBACTERIA CULTURE: CPT

## 2024-12-13 PROCEDURE — 93005 ELECTROCARDIOGRAM TRACING: CPT

## 2024-12-13 PROCEDURE — 87899 AGENT NOS ASSAY W/OPTIC: CPT

## 2024-12-13 PROCEDURE — 83516 IMMUNOASSAY NONANTIBODY: CPT

## 2024-12-13 PROCEDURE — 0042T: CPT | Mod: MC

## 2024-12-13 PROCEDURE — 85610 PROTHROMBIN TIME: CPT

## 2024-12-13 PROCEDURE — 70498 CT ANGIOGRAPHY NECK: CPT | Mod: MC

## 2024-12-13 PROCEDURE — 93321 DOPPLER ECHO F-UP/LMTD STD: CPT

## 2024-12-13 PROCEDURE — 82164 ANGIOTENSIN I ENZYME TEST: CPT

## 2024-12-13 PROCEDURE — 82784 ASSAY IGA/IGD/IGG/IGM EACH: CPT

## 2024-12-13 PROCEDURE — 82595 ASSAY OF CRYOGLOBULIN: CPT

## 2024-12-13 PROCEDURE — 87340 HEPATITIS B SURFACE AG IA: CPT

## 2024-12-13 PROCEDURE — 82570 ASSAY OF URINE CREATININE: CPT

## 2024-12-13 PROCEDURE — 82977 ASSAY OF GGT: CPT

## 2024-12-13 PROCEDURE — 86200 CCP ANTIBODY: CPT

## 2024-12-13 PROCEDURE — 84156 ASSAY OF PROTEIN URINE: CPT

## 2024-12-13 PROCEDURE — 86900 BLOOD TYPING SEROLOGIC ABO: CPT

## 2024-12-13 PROCEDURE — 84300 ASSAY OF URINE SODIUM: CPT

## 2024-12-13 PROCEDURE — 84100 ASSAY OF PHOSPHORUS: CPT

## 2024-12-13 PROCEDURE — 84443 ASSAY THYROID STIM HORMONE: CPT

## 2024-12-13 PROCEDURE — 70496 CT ANGIOGRAPHY HEAD: CPT | Mod: MC

## 2024-12-13 PROCEDURE — 86705 HEP B CORE ANTIBODY IGM: CPT

## 2024-12-13 PROCEDURE — 81001 URINALYSIS AUTO W/SCOPE: CPT

## 2024-12-13 PROCEDURE — 86160 COMPLEMENT ANTIGEN: CPT

## 2024-12-13 PROCEDURE — 94640 AIRWAY INHALATION TREATMENT: CPT

## 2024-12-13 PROCEDURE — 85730 THROMBOPLASTIN TIME PARTIAL: CPT

## 2024-12-13 PROCEDURE — 86803 HEPATITIS C AB TEST: CPT

## 2024-12-13 PROCEDURE — 82803 BLOOD GASES ANY COMBINATION: CPT

## 2024-12-13 PROCEDURE — 82746 ASSAY OF FOLIC ACID SERUM: CPT

## 2024-12-13 PROCEDURE — 97116 GAIT TRAINING THERAPY: CPT

## 2024-12-13 PROCEDURE — 97535 SELF CARE MNGMENT TRAINING: CPT

## 2024-12-13 PROCEDURE — 71045 X-RAY EXAM CHEST 1 VIEW: CPT

## 2024-12-13 PROCEDURE — 85810 BLOOD VISCOSITY EXAMINATION: CPT

## 2024-12-13 PROCEDURE — 82550 ASSAY OF CK (CPK): CPT

## 2024-12-13 PROCEDURE — 83930 ASSAY OF BLOOD OSMOLALITY: CPT

## 2024-12-13 PROCEDURE — 86706 HEP B SURFACE ANTIBODY: CPT

## 2024-12-13 PROCEDURE — 70553 MRI BRAIN STEM W/O & W/DYE: CPT | Mod: MC

## 2024-12-13 PROCEDURE — 93306 TTE W/DOPPLER COMPLETE: CPT

## 2024-12-13 PROCEDURE — 83003 ASSAY GROWTH HORMONE (HGH): CPT

## 2024-12-13 PROCEDURE — 84425 ASSAY OF VITAMIN B-1: CPT

## 2024-12-13 PROCEDURE — 84244 ASSAY OF RENIN: CPT

## 2024-12-13 PROCEDURE — 84155 ASSAY OF PROTEIN SERUM: CPT

## 2024-12-13 PROCEDURE — 93970 EXTREMITY STUDY: CPT

## 2024-12-13 PROCEDURE — 84133 ASSAY OF URINE POTASSIUM: CPT

## 2024-12-13 PROCEDURE — 87522 HEPATITIS C REVRS TRNSCRPJ: CPT

## 2024-12-13 PROCEDURE — 85027 COMPLETE CBC AUTOMATED: CPT

## 2024-12-13 PROCEDURE — 82610 CYSTATIN C: CPT

## 2024-12-13 PROCEDURE — 87389 HIV-1 AG W/HIV-1&-2 AB AG IA: CPT

## 2024-12-13 PROCEDURE — 82043 UR ALBUMIN QUANTITATIVE: CPT

## 2024-12-13 PROCEDURE — 36415 COLL VENOUS BLD VENIPUNCTURE: CPT

## 2024-12-13 PROCEDURE — 80307 DRUG TEST PRSMV CHEM ANLYZR: CPT

## 2024-12-13 PROCEDURE — 82607 VITAMIN B-12: CPT

## 2024-12-13 PROCEDURE — 87449 NOS EACH ORGANISM AG IA: CPT

## 2024-12-13 PROCEDURE — 83615 LACTATE (LD) (LDH) ENZYME: CPT

## 2024-12-13 PROCEDURE — 97110 THERAPEUTIC EXERCISES: CPT

## 2024-12-13 PROCEDURE — 80061 LIPID PANEL: CPT

## 2024-12-13 PROCEDURE — 85397 CLOTTING FUNCT ACTIVITY: CPT

## 2024-12-13 PROCEDURE — 97163 PT EVAL HIGH COMPLEX 45 MIN: CPT

## 2024-12-13 PROCEDURE — 83550 IRON BINDING TEST: CPT

## 2024-12-13 RX ORDER — LIPASE/PROTEASE/AMYLASE 8K-30K-30K
0 TABLET ORAL
Refills: 0 | DISCHARGE

## 2024-12-13 RX ORDER — URSODIOL 300 MG/1
1 CAPSULE ORAL
Qty: 0 | Refills: 0 | DISCHARGE

## 2024-12-13 RX ORDER — CARVEDILOL 25 MG/1
1 TABLET, FILM COATED ORAL
Qty: 0 | Refills: 0 | DISCHARGE
Start: 2024-12-13

## 2024-12-13 RX ORDER — MIDODRINE HYDROCHLORIDE 5 MG/1
1 TABLET ORAL
Refills: 0 | DISCHARGE

## 2024-12-13 RX ORDER — AMOXICILLIN/POTASSIUM CLAV 250-125 MG
1 TABLET ORAL
Qty: 0 | Refills: 0 | DISCHARGE
Start: 2024-12-13 | End: 2024-12-17

## 2024-12-13 RX ORDER — SODIUM CHLORIDE 0.65 %
1 AEROSOL, SPRAY (ML) NASAL
Qty: 0 | Refills: 0 | DISCHARGE
Start: 2024-12-13

## 2024-12-13 RX ORDER — TAMSULOSIN HYDROCHLORIDE 0.4 MG/1
1 CAPSULE ORAL
Qty: 0 | Refills: 0 | DISCHARGE

## 2024-12-13 RX ORDER — HYDRALAZINE HYDROCHLORIDE 10 MG/1
1 TABLET ORAL
Qty: 0 | Refills: 0 | DISCHARGE
Start: 2024-12-13

## 2024-12-13 RX ORDER — NAPHAZOLINE HCL/PHENIRAMINE 0.025-0.3%
2 DROPS OPHTHALMIC (EYE)
Qty: 0 | Refills: 0 | DISCHARGE
Start: 2024-12-13

## 2024-12-13 RX ORDER — ISOSORBIDE DINITRATE 40 MG/1
1 TABLET ORAL
Qty: 0 | Refills: 0 | DISCHARGE
Start: 2024-12-13

## 2024-12-13 RX ORDER — LOSARTAN POTASSIUM 100 MG/1
1 TABLET, FILM COATED ORAL
Qty: 0 | Refills: 0 | DISCHARGE
Start: 2024-12-13

## 2024-12-13 RX ORDER — LIPASE/PROTEASE/AMYLASE 8K-30K-30K
3 TABLET ORAL
Qty: 270 | Refills: 0
Start: 2024-12-13

## 2024-12-13 RX ORDER — SIROLIMUS 1 MG/1
1 TABLET ORAL
Qty: 0 | Refills: 0 | DISCHARGE

## 2024-12-13 RX ORDER — ONDANSETRON HYDROCHLORIDE 4 MG/1
1 TABLET, FILM COATED ORAL
Qty: 0 | Refills: 0 | DISCHARGE

## 2024-12-13 RX ORDER — SODIUM BICARBONATE 84 MG/ML
1 INJECTION, SOLUTION INTRAVENOUS
Qty: 0 | Refills: 0 | DISCHARGE
Start: 2024-12-13

## 2024-12-13 RX ADMIN — SIROLIMUS 2 MILLIGRAM(S): 1 TABLET ORAL at 06:27

## 2024-12-13 RX ADMIN — Medication 81 MILLIGRAM(S): at 12:06

## 2024-12-13 RX ADMIN — HYDRALAZINE HYDROCHLORIDE 25 MILLIGRAM(S): 10 TABLET ORAL at 06:28

## 2024-12-13 RX ADMIN — Medication 3 CAPSULE(S): at 12:05

## 2024-12-13 RX ADMIN — Medication 2 MILLIGRAM(S): at 06:28

## 2024-12-13 RX ADMIN — Medication 3 CAPSULE(S): at 09:57

## 2024-12-13 RX ADMIN — Medication 1 TABLET(S): at 02:52

## 2024-12-13 RX ADMIN — FLUTICASONE PROPIONATE AND SALMETEROL XINAFOATE 1 DOSE(S): 45; 21 AEROSOL, METERED RESPIRATORY (INHALATION) at 06:33

## 2024-12-13 RX ADMIN — Medication 2 DROP(S): at 06:34

## 2024-12-13 RX ADMIN — Medication 2 PUFF(S): at 12:05

## 2024-12-13 RX ADMIN — ISOSORBIDE DINITRATE 10 MILLIGRAM(S): 40 TABLET ORAL at 06:28

## 2024-12-13 RX ADMIN — FAMOTIDINE 20 MILLIGRAM(S): 20 TABLET, FILM COATED ORAL at 09:57

## 2024-12-13 RX ADMIN — SODIUM BICARBONATE 650 MILLIGRAM(S): 84 INJECTION, SOLUTION INTRAVENOUS at 06:27

## 2024-12-13 RX ADMIN — ACETAMINOPHEN 500MG 650 MILLIGRAM(S): 500 TABLET, COATED ORAL at 09:57

## 2024-12-13 RX ADMIN — ISOSORBIDE DINITRATE 10 MILLIGRAM(S): 40 TABLET ORAL at 12:07

## 2024-12-13 RX ADMIN — Medication 5000 UNIT(S): at 06:27

## 2024-12-13 RX ADMIN — SODIUM CHLORIDE 4 MILLILITER(S): 9 INJECTION, SOLUTION INTRAMUSCULAR; INTRAVENOUS; SUBCUTANEOUS at 09:57

## 2024-12-13 RX ADMIN — Medication 150 MILLIGRAM(S): at 12:06

## 2024-12-13 RX ADMIN — LOSARTAN POTASSIUM 100 MILLIGRAM(S): 100 TABLET, FILM COATED ORAL at 12:06

## 2024-12-13 RX ADMIN — Medication 5 MILLIGRAM(S): at 09:57

## 2024-12-13 RX ADMIN — ACETAMINOPHEN 500MG 650 MILLIGRAM(S): 500 TABLET, COATED ORAL at 10:57

## 2024-12-13 RX ADMIN — CARVEDILOL 3.12 MILLIGRAM(S): 25 TABLET, FILM COATED ORAL at 12:06

## 2024-12-13 NOTE — DISCHARGE NOTE NURSING/CASE MANAGEMENT/SOCIAL WORK - NSDCPEFALRISK_GEN_ALL_CORE
For information on Fall & Injury Prevention, visit: [email or web address] OR  [email or web address] OR  https://www.cdc.gov/steadi/patient.html

## 2024-12-13 NOTE — DISCHARGE NOTE NURSING/CASE MANAGEMENT/SOCIAL WORK - PATIENT PORTAL LINK FT
You can access the FollowMyHealth Patient Portal offered by [facility] by registering at the following website: [email or web address]. By joining [facility]’s FollowMyHealth portal, you will also be able to view your health information using other applications (apps) compatible with our system.

## 2024-12-13 NOTE — PROGRESS NOTE ADULT - NUTRITIONAL ASSESSMENT
This patient has been assessed with a concern for Malnutrition and has been determined to have a diagnosis/diagnoses of Severe protein-calorie malnutrition.    This patient is being managed with:   Diet Soft and Bite Sized-  No Straws  Entered: Dec  4 2024  3:13PM  
This patient has been assessed with a concern for Malnutrition and has been determined to have a diagnosis/diagnoses of Severe protein-calorie malnutrition.    This patient is being managed with:   Diet Soft and Bite Sized-  No Straws  Supplement Feeding Modality:  Oral  Ensure Plus High Protein Cans or Servings Per Day:  1       Frequency:  Two Times a day  Entered: Dec 12 2024  1:46PM  
This patient has been assessed with a concern for Malnutrition and has been determined to have a diagnosis/diagnoses of Severe protein-calorie malnutrition.    This patient is being managed with:   Diet Soft and Bite Sized-  No Straws  Entered: Dec  4 2024  3:13PM  
Satisfactory

## 2024-12-13 NOTE — PROVIDER CONTACT NOTE (CRITICAL VALUE NOTIFICATION) - TEST AND RESULT REPORTED:
troponin 104
acid fast sputum from 12/3- preluminary result growth of acid fast bacilli detected in broth media
potassium 2.6
Sodium level 119

## 2024-12-13 NOTE — PROVIDER CONTACT NOTE (CRITICAL VALUE NOTIFICATION) - SITUATION
Patient admitted for hyponatremia and hypertension
troponin 104
acid fast sputum from 12/3- preluminary result growth of acid fast bacilli detected in broth media

## 2024-12-13 NOTE — DISCHARGE NOTE NURSING/CASE MANAGEMENT/SOCIAL WORK - FINANCIAL ASSISTANCE
[facility] provides services at a reduced cost to those who are determined to be eligible through [facility]’s financial assistance program. Information regarding [facility]’s financial assistance program can be found by going to [email or web address] or by calling [phone].

## 2024-12-13 NOTE — PROGRESS NOTE ADULT - PROVIDER SPECIALTY LIST ADULT
Internal Medicine
Nephrology
Nephrology
Neurology
Internal Medicine
Internal Medicine
Neurology
Neurology
Pulmonology
Hepatology
Internal Medicine
Internal Medicine
Nephrology
Hepatology
Internal Medicine
Palliative Care
Internal Medicine

## 2024-12-13 NOTE — PROGRESS NOTE ADULT - REASON FOR ADMISSION
hypertensive urgency

## 2024-12-21 ENCOUNTER — RESULT REVIEW (OUTPATIENT)
Age: 79
End: 2024-12-21
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
## 2024-12-27 DIAGNOSIS — J43.2 CENTRILOBULAR EMPHYSEMA: ICD-10-CM

## 2024-12-27 DIAGNOSIS — K86.89 OTHER SPECIFIED DISEASES OF PANCREAS: ICD-10-CM

## 2024-12-27 DIAGNOSIS — G45.3 AMAUROSIS FUGAX: ICD-10-CM

## 2024-12-27 DIAGNOSIS — E43 UNSPECIFIED SEVERE PROTEIN-CALORIE MALNUTRITION: ICD-10-CM

## 2024-12-27 DIAGNOSIS — Z87.891 PERSONAL HISTORY OF NICOTINE DEPENDENCE: ICD-10-CM

## 2024-12-27 DIAGNOSIS — I16.0 HYPERTENSIVE URGENCY: ICD-10-CM

## 2024-12-27 DIAGNOSIS — G93.6 CEREBRAL EDEMA: ICD-10-CM

## 2024-12-27 DIAGNOSIS — D63.1 ANEMIA IN CHRONIC KIDNEY DISEASE: ICD-10-CM

## 2024-12-27 DIAGNOSIS — L89.151 PRESSURE ULCER OF SACRAL REGION, STAGE 1: ICD-10-CM

## 2024-12-27 DIAGNOSIS — E87.6 HYPOKALEMIA: ICD-10-CM

## 2024-12-27 DIAGNOSIS — E83.42 HYPOMAGNESEMIA: ICD-10-CM

## 2024-12-27 DIAGNOSIS — L89.211 PRESSURE ULCER OF RIGHT HIP, STAGE 1: ICD-10-CM

## 2024-12-27 DIAGNOSIS — Z94.4 LIVER TRANSPLANT STATUS: ICD-10-CM

## 2024-12-27 DIAGNOSIS — R91.8 OTHER NONSPECIFIC ABNORMAL FINDING OF LUNG FIELD: ICD-10-CM

## 2024-12-27 DIAGNOSIS — Z88.6 ALLERGY STATUS TO ANALGESIC AGENT: ICD-10-CM

## 2024-12-27 DIAGNOSIS — J44.9 CHRONIC OBSTRUCTIVE PULMONARY DISEASE, UNSPECIFIED: ICD-10-CM

## 2024-12-27 DIAGNOSIS — K50.90 CROHN'S DISEASE, UNSPECIFIED, WITHOUT COMPLICATIONS: ICD-10-CM

## 2024-12-27 DIAGNOSIS — M10.061 IDIOPATHIC GOUT, RIGHT KNEE: ICD-10-CM

## 2024-12-27 DIAGNOSIS — G93.41 METABOLIC ENCEPHALOPATHY: ICD-10-CM

## 2024-12-27 DIAGNOSIS — Z86.19 PERSONAL HISTORY OF OTHER INFECTIOUS AND PARASITIC DISEASES: ICD-10-CM

## 2024-12-27 DIAGNOSIS — R79.89 OTHER SPECIFIED ABNORMAL FINDINGS OF BLOOD CHEMISTRY: ICD-10-CM

## 2024-12-27 DIAGNOSIS — A31.0 PULMONARY MYCOBACTERIAL INFECTION: ICD-10-CM

## 2024-12-27 DIAGNOSIS — N40.0 BENIGN PROSTATIC HYPERPLASIA WITHOUT LOWER URINARY TRACT SYMPTOMS: ICD-10-CM

## 2024-12-27 DIAGNOSIS — I77.9 DISORDER OF ARTERIES AND ARTERIOLES, UNSPECIFIED: ICD-10-CM

## 2024-12-27 DIAGNOSIS — R65.10 SYSTEMIC INFLAMMATORY RESPONSE SYNDROME (SIRS) OF NON-INFECTIOUS ORIGIN WITHOUT ACUTE ORGAN DYSFUNCTION: ICD-10-CM

## 2024-12-27 DIAGNOSIS — I50.22 CHRONIC SYSTOLIC (CONGESTIVE) HEART FAILURE: ICD-10-CM

## 2024-12-27 DIAGNOSIS — E22.2 SYNDROME OF INAPPROPRIATE SECRETION OF ANTIDIURETIC HORMONE: ICD-10-CM

## 2024-12-27 DIAGNOSIS — S00.81XA ABRASION OF OTHER PART OF HEAD, INITIAL ENCOUNTER: ICD-10-CM

## 2024-12-27 DIAGNOSIS — N18.4 CHRONIC KIDNEY DISEASE, STAGE 4 (SEVERE): ICD-10-CM

## 2024-12-27 DIAGNOSIS — Z51.5 ENCOUNTER FOR PALLIATIVE CARE: ICD-10-CM

## 2024-12-27 DIAGNOSIS — R64 CACHEXIA: ICD-10-CM

## 2024-12-27 DIAGNOSIS — Z79.51 LONG TERM (CURRENT) USE OF INHALED STEROIDS: ICD-10-CM

## 2024-12-30 ENCOUNTER — RESULT REVIEW (OUTPATIENT)
Age: 79
End: 2024-12-30

## 2024-12-31 ENCOUNTER — EMERGENCY (EMERGENCY)
Facility: HOSPITAL | Age: 79
LOS: 1 days | Discharge: HOME | End: 2024-12-31
Attending: EMERGENCY MEDICINE
Payer: COMMERCIAL

## 2024-12-31 VITALS
RESPIRATION RATE: 19 BRPM | OXYGEN SATURATION: 97 % | HEART RATE: 100 BPM | WEIGHT: 165.35 LBS | TEMPERATURE: 97 F | DIASTOLIC BLOOD PRESSURE: 70 MMHG | SYSTOLIC BLOOD PRESSURE: 99 MMHG | HEIGHT: 72 IN

## 2024-12-31 VITALS
HEART RATE: 93 BPM | DIASTOLIC BLOOD PRESSURE: 80 MMHG | OXYGEN SATURATION: 98 % | TEMPERATURE: 98 F | RESPIRATION RATE: 18 BRPM | SYSTOLIC BLOOD PRESSURE: 160 MMHG

## 2024-12-31 DIAGNOSIS — Z90.49 ACQUIRED ABSENCE OF OTHER SPECIFIED PARTS OF DIGESTIVE TRACT: Chronic | ICD-10-CM

## 2024-12-31 DIAGNOSIS — Z98.890 OTHER SPECIFIED POSTPROCEDURAL STATES: Chronic | ICD-10-CM

## 2024-12-31 LAB
ADD ON TEST-SPECIMEN IN LAB: SIGNIFICANT CHANGE UP
ALBUMIN SERPL ELPH-MCNC: 3.1 G/DL — LOW (ref 3.3–5)
ALBUMIN SERPL ELPH-MCNC: 4 G/DL — SIGNIFICANT CHANGE UP (ref 3.3–5)
ALP SERPL-CCNC: 126 U/L — HIGH (ref 40–120)
ALP SERPL-CCNC: 170 U/L — HIGH (ref 40–120)
ALT FLD-CCNC: 19 U/L — SIGNIFICANT CHANGE UP (ref 10–45)
ALT FLD-CCNC: 23 U/L — SIGNIFICANT CHANGE UP (ref 10–45)
ANION GAP SERPL CALC-SCNC: 11 MMOL/L — SIGNIFICANT CHANGE UP (ref 5–17)
ANION GAP SERPL CALC-SCNC: 16 MMOL/L — SIGNIFICANT CHANGE UP (ref 5–17)
APPEARANCE UR: ABNORMAL
AST SERPL-CCNC: 27 U/L — SIGNIFICANT CHANGE UP (ref 10–40)
AST SERPL-CCNC: 29 U/L — SIGNIFICANT CHANGE UP (ref 10–40)
BACTERIA # UR AUTO: ABNORMAL /HPF
BASOPHILS # BLD AUTO: 0.01 K/UL — SIGNIFICANT CHANGE UP (ref 0–0.2)
BASOPHILS NFR BLD AUTO: 0.2 % — SIGNIFICANT CHANGE UP (ref 0–2)
BILIRUB SERPL-MCNC: 0.3 MG/DL — SIGNIFICANT CHANGE UP (ref 0.2–1.2)
BILIRUB SERPL-MCNC: 0.4 MG/DL — SIGNIFICANT CHANGE UP (ref 0.2–1.2)
BILIRUB UR-MCNC: NEGATIVE — SIGNIFICANT CHANGE UP
BUN SERPL-MCNC: 56 MG/DL — HIGH (ref 7–23)
BUN SERPL-MCNC: 58 MG/DL — HIGH (ref 7–23)
CALCIUM SERPL-MCNC: 9.1 MG/DL — SIGNIFICANT CHANGE UP (ref 8.4–10.5)
CALCIUM SERPL-MCNC: 9.7 MG/DL — SIGNIFICANT CHANGE UP (ref 8.4–10.5)
CAST: 3 /LPF — SIGNIFICANT CHANGE UP (ref 0–4)
CHLORIDE SERPL-SCNC: 102 MMOL/L — SIGNIFICANT CHANGE UP (ref 96–108)
CHLORIDE SERPL-SCNC: 98 MMOL/L — SIGNIFICANT CHANGE UP (ref 96–108)
CO2 SERPL-SCNC: 22 MMOL/L — SIGNIFICANT CHANGE UP (ref 22–31)
CO2 SERPL-SCNC: 23 MMOL/L — SIGNIFICANT CHANGE UP (ref 22–31)
COLOR SPEC: YELLOW — SIGNIFICANT CHANGE UP
CREAT SERPL-MCNC: 3.08 MG/DL — HIGH (ref 0.5–1.3)
CREAT SERPL-MCNC: 3.18 MG/DL — HIGH (ref 0.5–1.3)
DIFF PNL FLD: ABNORMAL
EGFR: 19 ML/MIN/1.73M2 — LOW
EGFR: 20 ML/MIN/1.73M2 — LOW
EOSINOPHIL # BLD AUTO: 0.12 K/UL — SIGNIFICANT CHANGE UP (ref 0–0.5)
EOSINOPHIL NFR BLD AUTO: 2.6 % — SIGNIFICANT CHANGE UP (ref 0–6)
GAS PNL BLDV: SIGNIFICANT CHANGE UP
GAS PNL BLDV: SIGNIFICANT CHANGE UP
GLUCOSE SERPL-MCNC: 101 MG/DL — HIGH (ref 70–99)
GLUCOSE SERPL-MCNC: 94 MG/DL — SIGNIFICANT CHANGE UP (ref 70–99)
GLUCOSE UR QL: NEGATIVE MG/DL — SIGNIFICANT CHANGE UP
HCT VFR BLD CALC: 31.6 % — LOW (ref 39–50)
HGB BLD-MCNC: 9.9 G/DL — LOW (ref 13–17)
IMM GRANULOCYTES NFR BLD AUTO: 0.4 % — SIGNIFICANT CHANGE UP (ref 0–0.9)
KETONES UR-MCNC: NEGATIVE MG/DL — SIGNIFICANT CHANGE UP
LEUKOCYTE ESTERASE UR-ACNC: ABNORMAL
LYMPHOCYTES # BLD AUTO: 1.42 K/UL — SIGNIFICANT CHANGE UP (ref 1–3.3)
LYMPHOCYTES # BLD AUTO: 30.3 % — SIGNIFICANT CHANGE UP (ref 13–44)
MCHC RBC-ENTMCNC: 30.2 PG — SIGNIFICANT CHANGE UP (ref 27–34)
MCHC RBC-ENTMCNC: 31.3 G/DL — LOW (ref 32–36)
MCV RBC AUTO: 96.3 FL — SIGNIFICANT CHANGE UP (ref 80–100)
MONOCYTES # BLD AUTO: 0.51 K/UL — SIGNIFICANT CHANGE UP (ref 0–0.9)
MONOCYTES NFR BLD AUTO: 10.9 % — SIGNIFICANT CHANGE UP (ref 2–14)
NEUTROPHILS # BLD AUTO: 2.61 K/UL — SIGNIFICANT CHANGE UP (ref 1.8–7.4)
NEUTROPHILS NFR BLD AUTO: 55.6 % — SIGNIFICANT CHANGE UP (ref 43–77)
NITRITE UR-MCNC: NEGATIVE — SIGNIFICANT CHANGE UP
NRBC # BLD: 0 /100 WBCS — SIGNIFICANT CHANGE UP (ref 0–0)
NT-PROBNP SERPL-SCNC: 3823 PG/ML — HIGH (ref 0–300)
PH UR: 7 — SIGNIFICANT CHANGE UP (ref 5–8)
PLATELET # BLD AUTO: 155 K/UL — SIGNIFICANT CHANGE UP (ref 150–400)
POTASSIUM SERPL-MCNC: 5 MMOL/L — SIGNIFICANT CHANGE UP (ref 3.5–5.3)
POTASSIUM SERPL-MCNC: 5.1 MMOL/L — SIGNIFICANT CHANGE UP (ref 3.5–5.3)
POTASSIUM SERPL-SCNC: 5 MMOL/L — SIGNIFICANT CHANGE UP (ref 3.5–5.3)
POTASSIUM SERPL-SCNC: 5.1 MMOL/L — SIGNIFICANT CHANGE UP (ref 3.5–5.3)
PROCALCITONIN SERPL-MCNC: 0.16 NG/ML — HIGH (ref 0.02–0.1)
PROT SERPL-MCNC: 6.3 G/DL — SIGNIFICANT CHANGE UP (ref 6–8.3)
PROT SERPL-MCNC: 7.7 G/DL — SIGNIFICANT CHANGE UP (ref 6–8.3)
PROT UR-MCNC: 100 MG/DL
RBC # BLD: 3.28 M/UL — LOW (ref 4.2–5.8)
RBC # FLD: 15.7 % — HIGH (ref 10.3–14.5)
RBC CASTS # UR COMP ASSIST: 25 /HPF — HIGH (ref 0–4)
SIROLIMUS BLD-MCNC: 10.6 NG/ML — SIGNIFICANT CHANGE UP (ref 4.5–14)
SODIUM SERPL-SCNC: 136 MMOL/L — SIGNIFICANT CHANGE UP (ref 135–145)
SODIUM SERPL-SCNC: 136 MMOL/L — SIGNIFICANT CHANGE UP (ref 135–145)
SP GR SPEC: 1.01 — SIGNIFICANT CHANGE UP (ref 1–1.03)
SQUAMOUS # UR AUTO: 0 /HPF — SIGNIFICANT CHANGE UP (ref 0–5)
UROBILINOGEN FLD QL: 0.2 MG/DL — SIGNIFICANT CHANGE UP (ref 0.2–1)
WBC # BLD: 4.69 K/UL — SIGNIFICANT CHANGE UP (ref 3.8–10.5)
WBC # FLD AUTO: 4.69 K/UL — SIGNIFICANT CHANGE UP (ref 3.8–10.5)
WBC UR QL: 542 /HPF — HIGH (ref 0–5)

## 2024-12-31 PROCEDURE — 96375 TX/PRO/DX INJ NEW DRUG ADDON: CPT | Mod: XU

## 2024-12-31 PROCEDURE — 87086 URINE CULTURE/COLONY COUNT: CPT

## 2024-12-31 PROCEDURE — 93308 TTE F-UP OR LMTD: CPT

## 2024-12-31 PROCEDURE — 83880 ASSAY OF NATRIURETIC PEPTIDE: CPT

## 2024-12-31 PROCEDURE — 82330 ASSAY OF CALCIUM: CPT

## 2024-12-31 PROCEDURE — 99285 EMERGENCY DEPT VISIT HI MDM: CPT | Mod: 25

## 2024-12-31 PROCEDURE — 85025 COMPLETE CBC W/AUTO DIFF WBC: CPT

## 2024-12-31 PROCEDURE — 80195 ASSAY OF SIROLIMUS: CPT

## 2024-12-31 PROCEDURE — 87040 BLOOD CULTURE FOR BACTERIA: CPT

## 2024-12-31 PROCEDURE — 93308 TTE F-UP OR LMTD: CPT | Mod: 26

## 2024-12-31 PROCEDURE — 70450 CT HEAD/BRAIN W/O DYE: CPT | Mod: 26,MC

## 2024-12-31 PROCEDURE — 70450 CT HEAD/BRAIN W/O DYE: CPT | Mod: MC

## 2024-12-31 PROCEDURE — 84295 ASSAY OF SERUM SODIUM: CPT

## 2024-12-31 PROCEDURE — 80053 COMPREHEN METABOLIC PANEL: CPT

## 2024-12-31 PROCEDURE — 82803 BLOOD GASES ANY COMBINATION: CPT

## 2024-12-31 PROCEDURE — 51702 INSERT TEMP BLADDER CATH: CPT

## 2024-12-31 PROCEDURE — 99283 EMERGENCY DEPT VISIT LOW MDM: CPT

## 2024-12-31 PROCEDURE — 83605 ASSAY OF LACTIC ACID: CPT

## 2024-12-31 PROCEDURE — 82435 ASSAY OF BLOOD CHLORIDE: CPT

## 2024-12-31 PROCEDURE — 84145 PROCALCITONIN (PCT): CPT

## 2024-12-31 PROCEDURE — 85018 HEMOGLOBIN: CPT

## 2024-12-31 PROCEDURE — 85014 HEMATOCRIT: CPT

## 2024-12-31 PROCEDURE — 74176 CT ABD & PELVIS W/O CONTRAST: CPT | Mod: 26,MC

## 2024-12-31 PROCEDURE — 84132 ASSAY OF SERUM POTASSIUM: CPT

## 2024-12-31 PROCEDURE — 93005 ELECTROCARDIOGRAM TRACING: CPT | Mod: XU

## 2024-12-31 PROCEDURE — 81001 URINALYSIS AUTO W/SCOPE: CPT

## 2024-12-31 PROCEDURE — 74176 CT ABD & PELVIS W/O CONTRAST: CPT | Mod: MC

## 2024-12-31 PROCEDURE — 96366 THER/PROPH/DIAG IV INF ADDON: CPT | Mod: XU

## 2024-12-31 PROCEDURE — 96365 THER/PROPH/DIAG IV INF INIT: CPT | Mod: XU

## 2024-12-31 PROCEDURE — 99285 EMERGENCY DEPT VISIT HI MDM: CPT

## 2024-12-31 PROCEDURE — 82947 ASSAY GLUCOSE BLOOD QUANT: CPT

## 2024-12-31 RX ORDER — CEFTRIAXONE SODIUM 1 G
1000 VIAL (EA) INJECTION ONCE
Refills: 0 | Status: COMPLETED | OUTPATIENT
Start: 2024-12-31 | End: 2024-12-31

## 2024-12-31 RX ORDER — SODIUM CHLORIDE 9 MG/ML
250 INJECTION, SOLUTION INTRAMUSCULAR; INTRAVENOUS; SUBCUTANEOUS ONCE
Refills: 0 | Status: COMPLETED | OUTPATIENT
Start: 2024-12-31 | End: 2024-12-31

## 2024-12-31 RX ORDER — SIROLIMUS 1 MG/1
2 TABLET ORAL DAILY
Refills: 0 | Status: ACTIVE | OUTPATIENT
Start: 2024-12-31 | End: 2025-11-29

## 2024-12-31 RX ORDER — 0.9 % SODIUM CHLORIDE 0.9 %
500 INTRAVENOUS SOLUTION INTRAVENOUS ONCE
Refills: 0 | Status: COMPLETED | OUTPATIENT
Start: 2024-12-31 | End: 2024-12-31

## 2024-12-31 RX ORDER — ACETAMINOPHEN 500MG 500 MG/1
1000 TABLET, COATED ORAL ONCE
Refills: 0 | Status: COMPLETED | OUTPATIENT
Start: 2024-12-31 | End: 2024-12-31

## 2024-12-31 RX ADMIN — SIROLIMUS 2 MILLIGRAM(S): 1 TABLET ORAL at 22:05

## 2024-12-31 RX ADMIN — Medication 1000 MILLIGRAM(S): at 17:05

## 2024-12-31 RX ADMIN — Medication 500 MILLILITER(S): at 14:03

## 2024-12-31 RX ADMIN — ACETAMINOPHEN 500MG 400 MILLIGRAM(S): 500 TABLET, COATED ORAL at 18:23

## 2024-12-31 RX ADMIN — Medication 100 MILLIGRAM(S): at 14:03

## 2024-12-31 RX ADMIN — SODIUM CHLORIDE 250 MILLILITER(S): 9 INJECTION, SOLUTION INTRAMUSCULAR; INTRAVENOUS; SUBCUTANEOUS at 19:35

## 2024-12-31 RX ADMIN — Medication 500 MILLILITER(S): at 17:05

## 2024-12-31 NOTE — ED CLERICAL - NS ED CLERK NOTE PRE-ARRIVAL INFORMATION; ADDITIONAL PRE-ARRIVAL INFORMATION
CC/Reason For referral: s/p fall last night hit head refused ED @ 2AM, now more headaches and agreeable to ED eval, needs CT head, r/o bleed need work up for urinary infection, need f/u right big toe swollen, rheumatology consult  Preferred Consultant(if applicable): CT head, rheumatology consult  Who admits for you (if needed): n/a  Do you have documents you would like to fax over? no  Would you still like to speak to an ED attending? yes

## 2024-12-31 NOTE — ED ADULT NURSE NOTE - NSICDXPASTSURGICALHX_GEN_ALL_CORE_FT
numbness
PAST SURGICAL HISTORY:  H/O right hemicolectomy Performed in 1976    S/P thoracotomy removal of benign granuloma

## 2024-12-31 NOTE — ED PROVIDER NOTE - NSICDXPASTMEDICALHX_GEN_ALL_CORE_FT
PAST MEDICAL HISTORY:  Carotid artery disease     Chronic obstructive bronchitis     Cirrhosis     Crohn disease     H/O squamous cell carcinoma     Other ascites     Spleen enlarged

## 2024-12-31 NOTE — ED ADULT NURSE REASSESSMENT NOTE - NS ED NURSE REASSESS COMMENT FT1
Indwelling Dias catheter placed as per MD order; 2 RNs at bedside during insertion; sterile technique utilized and maintained. Catheter securement device placed, catheter draining to gravity, 300mL -- urine drained. Pt tolerated well.
Pt transferred to leg bag. Pt wife requesting to take pt back to [facility][1] instead of waiting for non-emergent. MD Lopez fine with this. Charge made aware. Called [facility][1] to make them aware and have staff to receive pt with wheelchair. Spoke to Raina at [facility] facility. Pt Wife advised to call [address] [place] [address] when she arrives with pt.
Pt bladder scanned by break coverage RN. >400cc in bladder after pt urinated. MD made aware

## 2024-12-31 NOTE — ED ADULT NURSE NOTE - NSFALLRISKINTERV_ED_ALL_ED

## 2024-12-31 NOTE — ED ADULT NURSE NOTE - OBJECTIVE STATEMENT
79y Axox4 M arrived from [facility] brought by ambulance status post fall. PMH HTN, liver transplant, COPD. Pt endorses bathroom at 130 am, tried to bend down and lost consciousness, recalling head strike. Pt endorses HA. Pt denies N/V. Pt denies chest pain, SOB. Wife at bedside.

## 2024-12-31 NOTE — ED PROVIDER NOTE - NSFOLLOWUPINSTRUCTIONS_ED_ALL_ED_FT
Patient should follow-up with urology for assessment of Dias catheter.    Patient was given dose of Rocephin in the ER which should be continued.

## 2024-12-31 NOTE — ED PROVIDER NOTE - OBJECTIVE STATEMENT
79-year-old male with complicated past medical history including hep C status post liver transplant, autonomic dysfunction, COPD, Mycobacterium AVM complex lung disease, anemia, BPH, pancreatic insufficiency, presents to ED from [facility] rehab after fall with head strike this morning.  Patient states he went to the bathroom at around 1:30 AM.  Bent down to get a paper towel on the floor and briefly lost consciousness.  Patient states he came about right before falling backwards and hitting his head on the door.  Recalls head strike.  Helped up by staff.  Patient went back to bed and on reassessment when he woke up reported global pressure-like headache and was sent to the ED.  No nausea or vomiting.  No chest pain or shortness of breath.  No palpitations.  No extremity numbness or weakness.

## 2024-12-31 NOTE — CONSULT NOTE ADULT - ASSESSMENT
79 year old male s/p fall, poss UTI, incidentally found to have bilateral renal stones, no hydro  - no acute  intervention at this time  - recommend katz catheter  - continue Flomax and Proscar  - hydration  - trend creatinine  - bowel regimen  - patient has private urologist who he can follow up with or can follow up at [facility] [phone]  - d/w Dr. Haley

## 2024-12-31 NOTE — ED PROVIDER NOTE - ATTENDING APP SHARED VISIT CONTRIBUTION OF CARE
79-year-old male history of liver transplant, CAD, Crohn's disease, COPD, autonomic nervous system disorder with orthostasis, Mycobacterium avium lung disease, chronic anemia presents after fall at [facility] rehab early this morning when he was using the restroom.  Reports that he bent over to pick something up became dizzy and lightheaded transiently lost consciousness falling with head strike to the vertex of the skull on the bathroom door.  No laceration or avulsion.  Was walked back to the bed with assistance and was feeling okay until he woke up with a headache this morning.  Took Tylenol prior to arrival for his headache symptoms which has helped significantly.  He denies any nausea vomiting visual changes or injury to other parts of his body.  He has no weakness numbness or tingling that is new.  He does report that he has had bilateral leg weakness which is the reason he is at rehab.  On exam he is able to raise both legs up off of the bed against gravity and hold them.  He has no midline CT or L-spine tenderness to palpation no chest wall deformity or tenderness abdomen soft nontender pelvis stable full range of motion extremities x 4.  No Scalp hematomas noted.  Bilateral lower extremity edema from below the knee down to the feet.  Edema to the legs has been present now for some time.  Had a recent duplex at [facility] that was negative for DVT.  His lungs are clear he has no JVD he has no calf tenderness there is no warmth or redness.  I suspect that the lower extremity edema is likely dependent edema as he has been spending majority of the time seated at rehab with his legs in dependent position.  I have lower suspicion for DVT. Will get screening head CT to rule out intracranial hemorrhage.  Will check labs including CBC and CMP to evaluate for anemia and/or electrolyte disturbance worsening renal failure.  Suspect his syncopal episode was likely orthostatic in origin.  Will place on telemetry obtain EKG. 79-year-old male history of liver transplant, CAD, Crohn's disease, COPD, autonomic nervous system disorder with orthostasis, Mycobacterium avium lung disease, chronic anemia presents after fall at [facility] rehab early this morning when he was using the restroom.  Reports that he bent over to pick something up became dizzy and lightheaded transiently lost consciousness falling with head strike to the vertex of the skull on the bathroom door.  No laceration or avulsion.  Was walked back to the bed with assistance and was feeling okay until he woke up with a headache this morning.  Took Tylenol prior to arrival for his headache symptoms which has helped significantly.  He denies any nausea vomiting visual changes or injury to other parts of his body.  He has no weakness numbness or tingling that is new.  He does report that he has had bilateral leg weakness which is the reason he is at rehab.  On exam he is able to raise both legs up off of the bed against gravity and hold them.  He has no midline CT or L-spine tenderness to palpation no chest wall deformity or tenderness abdomen soft nontender pelvis stable full range of motion extremities x 4.  No Scalp hematomas noted.  Bilateral lower extremity edema from below the knee down to the feet.  Edema to the legs has been present now for some time.  Had a recent duplex at [facility] that was negative for DVT.  His lungs are clear he has no JVD he has no calf tenderness there is no warmth or redness.  I suspect that the lower extremity edema is likely dependent edema as he has been spending majority of the time seated at rehab with his legs in dependent position.  I have lower suspicion for DVT. Rehab raised question of gout however no redness, warmth or swelling noted to joints in foot/ankle or knee. Will get screening head CT to rule out intracranial hemorrhage.  Will check labs including CBC and CMP to evaluate for anemia and/or electrolyte disturbance worsening renal failure.  Suspect his syncopal episode was likely orthostatic in origin. BP here 99/70 initial. Prior hospitalization w systolic BPs ranging from 150s-100s. Will place on telemetry obtain EKG.

## 2024-12-31 NOTE — ED PROVIDER NOTE - PROGRESS NOTE DETAILS
Dr. Laura:  Patent and mildly elevated from baseline urine consistent with infection.  Will cover with antibiotics.  Will get CT abdomen pelvis Noncon to evaluate for renal stone.  Prior EF moderately reduced will give small bolus given elevated creatinine.  Depend upon results of imaging and reassessment Attending Delmy Lopez: Patient endorsed to me.  Reviewed blood work patient with mildly elevated creatinine.  Discussed with patient and family member who states that creatinine sometimes elevates when patient has an infection.  Patient denies any abdominal pain or shortness of breath.  Point-of-care ultrasound performed showing flat IVC.  Less likely creatinine due to hepatorenal syndrome.  CT scan pending to further evaluate and patient given antibiotics for concern for UTI.  Open Vijay Aguilar PA-C: Patient has right-sided staghorn stone with nonspecific thickening of the right ureter.  Given UTI and bump in creatinine, spoke with urology will see patient in ED. Vijay Aguilar PA-C: Patient seen by urology recommending Dias catheter which was placed in ED with output of clear yellow urine greater than 400 cc.  Patient is stable to be discharged back to [facility] where he can continue to receive antibiotics for his UTI and follow-up with urology.  Wife at bedside is comfortable driving patient herself back to [facility] but will need assistance.  RN will call [facility] to give report and ensure assistance for patient upon arrival.  Offered nonemergent ambulette but patient and wife declined.  All pertinent results, discharge instructions and return precautions given.

## 2024-12-31 NOTE — ED PROVIDER NOTE - PHYSICAL EXAMINATION
GEN: Pt non-toxic in NAD, alert.  PSYCH: Affect and mood appropriate.  EYES: Sclera white w/o injection, EOMI.   ENT: Neck supple. Airway patent.  RESP: CTA b/l.  CARDIAC: RRR, S1, S2, no M/G/R.  ABD: Soft, NT. No CVAT b/l.  MSK: No midline spinal ttp.  FATIMA spontaneously.  NEURO: Nonfocal. Holds all ext ag.  VASC: Strong distal pulses. Trace LE pitting edema L>R, no erythema or calf ttp.  SKIN: Small skin tear L forearm. GEN: Pt non-toxic in NAD, alert.  PSYCH: Affect and mood appropriate.  EYES: Sclera white w/o injection, EOMI.   ENT: Neck supple. Airway patent.  RESP: CTA b/l.  CARDIAC: RRR, S1, S2, no M/G/R.  ABD: Soft, NT. No CVAT b/l.  MSK: No midline spinal ttp.  FATIMA spontaneously. No LE joint erythema or ttp.  NEURO: Nonfocal. Holds all ext ag.  VASC: Strong distal pulses. Trace LE pitting edema L>R, no erythema or calf ttp.  SKIN: Small skin tear L forearm.

## 2024-12-31 NOTE — ED PROVIDER NOTE - NSICDXPASTSURGICALHX_GEN_ALL_CORE_FT
PAST SURGICAL HISTORY:  H/O right hemicolectomy Performed in 1976    S/P thoracotomy removal of benign granuloma

## 2024-12-31 NOTE — CONSULT NOTE ADULT - SUBJECTIVE AND OBJECTIVE BOX
HPI:  Patient is a 79y Male who presented with          PAST MEDICAL & SURGICAL HISTORY:  Chronic obstructive bronchitis      Other ascites      Cirrhosis      Spleen enlarged      Crohn disease      H/O squamous cell carcinoma      Carotid artery disease      H/O right hemicolectomy  Performed in 1976      S/P thoracotomy  removal of benign granuloma        MEDICATIONS  (STANDING):  	isosorbide dinitrate 10 mg oral tablet: 1 tab(s) orally every 8 hours  · 	sodium chloride 0.65% nasal spray: 1 nasal every 6 hours as needed for  pain  · 	naphazoline-pheniramine 0.025%-0.3% ophthalmic solution: 2 drop(s) to each affected eye 2 times a day  · 	amoxicillin-clavulanate 500 mg-125 mg oral tablet: 1 tab(s) orally every 12 hours EOT 12/17  · 	hydrALAZINE 25 mg oral tablet: 1 tab(s) orally every 8 hours  · 	sodium bicarbonate 650 mg oral tablet: 1 tab(s) orally every 8 hours  · 	carvedilol 3.125 mg oral tablet: 1 tab(s) orally every 12 hours  · 	aspirin 81 mg oral tablet, chewable: 1 tab(s) orally once a day  · 	atorvastatin 80 mg oral tablet: 1 tab(s) orally once a day (at bedtime)  · 	losartan 100 mg oral tablet: 1 tab(s) orally every 24 hours  · 	Advair  mcg-21 mcg/inh inhalation aerosol: 2 inhaled 2 times a day  · 	sirolimus 2 mg oral tablet: 1 tab(s) orally once a day To be given first thing in the morning on empty stomach. No other PO medications until 30-60 minutes after administration  · 	tamsulosin 0.4 mg oral capsule: 1 cap(s) orally once a day (at bedtime)  · 	acitretin 10 mg oral capsule: 1 cap(s) orally 2 times a day  · 	hydrocortisone 2.5% topical cream: Apply topically to affected area  · 	fluocinolone 0.01% topical cream: Apply topically to affected area  · 	mupirocin 2% topical cream: Apply topically to affected area  · 	loperamide 2 mg oral capsule: 1 cap(s) orally 4 times a day  · 	ursodiol 500 mg oral tablet: 1 tab(s) orally once a day (at bedtime)  · 	ondansetron 4 mg oral tablet: 1 tab(s) orally every 8 hours as needed for  nausea  · 	benzonatate 100 mg oral capsule: 1 cap(s) orally as needed for  cough  · 	finasteride 5 mg oral tablet: 1 tab(s) orally once a day  · 	NebuSal 3% inhalation solution: inhaled 2 times a day  · 	buPROPion 150 mg/24 hours (XL) oral tablet, extended release: 1 tab(s) orally once a day  · 	famotidine 20 mg oral tablet: 1 tab(s) orally 2 times a day  · 	allopurinol 100 mg oral tablet: orally once a day  · 	Spiriva 18 mcg inhalation capsule: 1 cap(s) inhaled 2 times a day      MEDICATIONS  (PRN):    FAMILY HISTORY:  No pertinent family history in first degree relatives      Allergies    NSAIDs (Pruritus)  Aleve (Unknown)    Intolerances      SOCIAL HISTORY:   Tobacco hx:    REVIEW OF SYSTEMS: Pertinent positives and negatives as stated in HPI, otherwise negative    Vital signs  T(C): 36.7 (12-31-24 @ 14:10), Max: 36.7 (12-31-24 @ 14:10)  HR: 70 (12-31-24 @ 17:28)  BP: 148/95 (12-31-24 @ 17:28)  SpO2: 96% (12-31-24 @ 17:28)  Wt(kg): --    Output    UOP    Physical Exam  Gen: NAD  Pulm: No respiratory distress, no subcostal retractions  CV: RRR, no JVD  Abd: Soft, NT, ND  :   MSK: No edema present    LABS:          12-31 @ 15:58    WBC --    / Hct --    / SCr 3.08     12-31 @ 12:43    WBC 4.69  / Hct 31.6  / SCr 3.18     12-31    136  |  98  |  56[H]  ----------------------------<  94  5.1   |  22  |  3.08[H]    Ca    9.7      31 Dec 2024 15:58    TPro  7.7  /  Alb  4.0  /  TBili  0.4  /  DBili  x   /  AST  27  /  ALT  23  /  AlkPhos  170[H]  12-31      Urinalysis Basic - ( 31 Dec 2024 15:58 )    Color: x / Appearance: x / SG: x / pH: x  Gluc: 94 mg/dL / Ketone: x  / Bili: x / Urobili: x   Blood: x / Protein: x / Nitrite: x   Leuk Esterase: x / RBC: x / WBC x   Sq Epi: x / Non Sq Epi: x / Bacteria: x        Urine Cx:   Blood Cx:    RADIOLOGY:    < from: CT Abdomen and Pelvis No Cont (12.31.24 @ 17:59) >  IMPRESSION:  Bilateral renal nonobstructive stones. The right renal staghorn stone   measures 1.5 cm.  Nonspecific focal dilatation/thickening of the right ureter measuring 1.1   cm in diameter.  Trabeculated urinary bladder.  Recommend further evaluation with CTor MR urogram.    < end of copied text >       79-year-old male with complicated past medical history including hep C status post liver transplant, autonomic dysfunction, COPD, Mycobacterium AVM complex lung disease, anemia, BPH, pancreatic insufficiency, presents to ED from [facility] rehab after fall with head strike this morning. No LOC.  Patient was attempting to use the bathroom at the time.     Urology consulted due to finding on CT of bilateral intrarenal kidney stones.  Patient states that he recently had his katz catheter removed (had been initially placed for urinary retention on previous admission).  He states that his urination is slow, and voids frequently.  Denies hematuria, but states he has felt some dysuria over the last day or so.  Denies fevers or chills, abdominal or flank pain.  He sees a Dr. Green at [facility] for management of BPH and kidney stones.  He was aware of the kidney stones but his urologist did not feel any need for intervention since they were intrarenal. He takes Flomax and Proscar daily.          PAST MEDICAL & SURGICAL HISTORY:  Chronic obstructive bronchitis      Other ascites      Cirrhosis      Spleen enlarged      Crohn disease      H/O squamous cell carcinoma      Carotid artery disease      H/O right hemicolectomy  Performed in 1976      S/P thoracotomy  removal of benign granuloma        MEDICATIONS  (STANDING):  	isosorbide dinitrate 10 mg oral tablet: 1 tab(s) orally every 8 hours  · 	sodium chloride 0.65% nasal spray: 1 nasal every 6 hours as needed for  pain  · 	naphazoline-pheniramine 0.025%-0.3% ophthalmic solution: 2 drop(s) to each affected eye 2 times a day  · 	amoxicillin-clavulanate 500 mg-125 mg oral tablet: 1 tab(s) orally every 12 hours EOT 12/17  · 	hydrALAZINE 25 mg oral tablet: 1 tab(s) orally every 8 hours  · 	sodium bicarbonate 650 mg oral tablet: 1 tab(s) orally every 8 hours  · 	carvedilol 3.125 mg oral tablet: 1 tab(s) orally every 12 hours  · 	aspirin 81 mg oral tablet, chewable: 1 tab(s) orally once a day  · 	atorvastatin 80 mg oral tablet: 1 tab(s) orally once a day (at bedtime)  · 	losartan 100 mg oral tablet: 1 tab(s) orally every 24 hours  · 	Advair  mcg-21 mcg/inh inhalation aerosol: 2 inhaled 2 times a day  · 	sirolimus 2 mg oral tablet: 1 tab(s) orally once a day To be given first thing in the morning on empty stomach. No other PO medications until 30-60 minutes after administration  · 	tamsulosin 0.4 mg oral capsule: 1 cap(s) orally once a day (at bedtime)  · 	acitretin 10 mg oral capsule: 1 cap(s) orally 2 times a day  · 	hydrocortisone 2.5% topical cream: Apply topically to affected area  · 	fluocinolone 0.01% topical cream: Apply topically to affected area  · 	mupirocin 2% topical cream: Apply topically to affected area  · 	loperamide 2 mg oral capsule: 1 cap(s) orally 4 times a day  · 	ursodiol 500 mg oral tablet: 1 tab(s) orally once a day (at bedtime)  · 	ondansetron 4 mg oral tablet: 1 tab(s) orally every 8 hours as needed for  nausea  · 	benzonatate 100 mg oral capsule: 1 cap(s) orally as needed for  cough  · 	finasteride 5 mg oral tablet: 1 tab(s) orally once a day  · 	NebuSal 3% inhalation solution: inhaled 2 times a day  · 	buPROPion 150 mg/24 hours (XL) oral tablet, extended release: 1 tab(s) orally once a day  · 	famotidine 20 mg oral tablet: 1 tab(s) orally 2 times a day  · 	allopurinol 100 mg oral tablet: orally once a day  · 	Spiriva 18 mcg inhalation capsule: 1 cap(s) inhaled 2 times a day      MEDICATIONS  (PRN):    FAMILY HISTORY:  No pertinent family history in first degree relatives      Allergies    NSAIDs (Pruritus)  Aleve (Unknown)    Intolerances      SOCIAL HISTORY:   Tobacco hx:    REVIEW OF SYSTEMS: Pertinent positives and negatives as stated in HPI, otherwise negative    Vital signs  T(C): 36.7 (12-31-24 @ 14:10), Max: 36.7 (12-31-24 @ 14:10)  HR: 70 (12-31-24 @ 17:28)  BP: 148/95 (12-31-24 @ 17:28)  SpO2: 96% (12-31-24 @ 17:28)  Wt(kg): --        Physical Exam  Gen: NAD  Pulm: No respiratory distress, no subcostal retractions  CV: RRR, no JVD  Abd: Soft, NT, ND  Back: No CVAT      LABS:          12-31 @ 15:58    WBC --    / Hct --    / SCr 3.08     12-31 @ 12:43    WBC 4.69  / Hct 31.6  / SCr 3.18     12-31    136  |  98  |  56[H]  ----------------------------<  94  5.1   |  22  |  3.08[H]    Ca    9.7      31 Dec 2024 15:58    TPro  7.7  /  Alb  4.0  /  TBili  0.4  /  DBili  x   /  AST  27  /  ALT  23  /  AlkPhos  170[H]  12-31      Urinalysis Basic - ( 31 Dec 2024 15:58 )    Color: x / Appearance: x / SG: x / pH: x  Gluc: 94 mg/dL / Ketone: x  / Bili: x / Urobili: x   Blood: x / Protein: x / Nitrite: x   Leuk Esterase: x / RBC: x / WBC x   Sq Epi: x / Non Sq Epi: x / Bacteria: x    Urinalysis Basic - ( 31 Dec 2024 15:58 )          Urine Cx:   Blood Cx:    RADIOLOGY:    < from: CT Abdomen and Pelvis No Cont (12.31.24 @ 17:59) >  IMPRESSION:  Bilateral renal nonobstructive stones. The right renal staghorn stone   measures 1.5 cm.  Nonspecific focal dilatation/thickening of the right ureter measuring 1.1   cm in diameter.  Trabeculated urinary bladder.  Recommend further evaluation with CTor MR urogram.    < end of copied text >

## 2024-12-31 NOTE — ED PROVIDER NOTE - PATIENT PORTAL LINK FT
You can access the FollowMyHealth Patient Portal offered by [facility] by registering at the following website: [email or web address]. By joining [employer]’s FollowMyHealth portal, you will also be able to view your health information using other applications (apps) compatible with our system.

## 2024-12-31 NOTE — ED PROVIDER NOTE - HIV OFFER
Pt here with c/o N/V/D with diffuse abdominal pain onset yesterday unable to tolerate oral intake.   
Previously Negative (within the last year)

## 2025-01-01 LAB
CULTURE RESULTS: SIGNIFICANT CHANGE UP
SPECIMEN SOURCE: SIGNIFICANT CHANGE UP

## 2025-01-01 NOTE — ED POST DISCHARGE NOTE - ADDITIONAL DOCUMENTATION
per ED chart- pt seen and evaluated by urology with close follow up with private urologist outpt. made aware of the findings.

## 2025-01-07 ENCOUNTER — APPOINTMENT (OUTPATIENT)
Dept: OPHTHALMOLOGY | Facility: CLINIC | Age: 80
End: 2025-01-07

## 2025-01-15 ENCOUNTER — EMERGENCY (EMERGENCY)
Facility: HOSPITAL | Age: 80
LOS: 1 days | Discharge: HOME | End: 2025-01-15
Attending: EMERGENCY MEDICINE
Payer: COMMERCIAL

## 2025-01-15 VITALS
OXYGEN SATURATION: 99 % | RESPIRATION RATE: 16 BRPM | HEART RATE: 80 BPM | TEMPERATURE: 98 F | SYSTOLIC BLOOD PRESSURE: 105 MMHG | DIASTOLIC BLOOD PRESSURE: 71 MMHG | HEIGHT: 72 IN | WEIGHT: 134.92 LBS

## 2025-01-15 VITALS
DIASTOLIC BLOOD PRESSURE: 72 MMHG | HEART RATE: 97 BPM | RESPIRATION RATE: 17 BRPM | OXYGEN SATURATION: 97 % | TEMPERATURE: 98 F | SYSTOLIC BLOOD PRESSURE: 103 MMHG

## 2025-01-15 DIAGNOSIS — Z98.890 OTHER SPECIFIED POSTPROCEDURAL STATES: Chronic | ICD-10-CM

## 2025-01-15 DIAGNOSIS — Z90.49 ACQUIRED ABSENCE OF OTHER SPECIFIED PARTS OF DIGESTIVE TRACT: Chronic | ICD-10-CM

## 2025-01-15 LAB
ALBUMIN SERPL ELPH-MCNC: 3.4 G/DL — SIGNIFICANT CHANGE UP (ref 3.3–5)
ALP SERPL-CCNC: 139 U/L — HIGH (ref 40–120)
ALT FLD-CCNC: 104 U/L — HIGH (ref 10–45)
ANION GAP SERPL CALC-SCNC: 13 MMOL/L — SIGNIFICANT CHANGE UP (ref 5–17)
APPEARANCE UR: ABNORMAL
AST SERPL-CCNC: 69 U/L — HIGH (ref 10–40)
BACTERIA # UR AUTO: ABNORMAL /HPF
BASOPHILS # BLD AUTO: 0.01 K/UL — SIGNIFICANT CHANGE UP (ref 0–0.2)
BASOPHILS NFR BLD AUTO: 0.2 % — SIGNIFICANT CHANGE UP (ref 0–2)
BILIRUB SERPL-MCNC: 0.4 MG/DL — SIGNIFICANT CHANGE UP (ref 0.2–1.2)
BILIRUB UR-MCNC: NEGATIVE — SIGNIFICANT CHANGE UP
BUN SERPL-MCNC: 84 MG/DL — HIGH (ref 7–23)
CALCIUM SERPL-MCNC: 9.1 MG/DL — SIGNIFICANT CHANGE UP (ref 8.4–10.5)
CHLORIDE SERPL-SCNC: 103 MMOL/L — SIGNIFICANT CHANGE UP (ref 96–108)
CO2 SERPL-SCNC: 17 MMOL/L — LOW (ref 22–31)
COLOR SPEC: YELLOW — SIGNIFICANT CHANGE UP
CREAT SERPL-MCNC: 3.33 MG/DL — HIGH (ref 0.5–1.3)
DIFF PNL FLD: ABNORMAL
EGFR: 18 ML/MIN/1.73M2 — LOW
EGFR: 18 ML/MIN/1.73M2 — LOW
EOSINOPHIL # BLD AUTO: 0.09 K/UL — SIGNIFICANT CHANGE UP (ref 0–0.5)
EOSINOPHIL NFR BLD AUTO: 1.9 % — SIGNIFICANT CHANGE UP (ref 0–6)
FLUAV AG NPH QL: SIGNIFICANT CHANGE UP
FLUBV AG NPH QL: SIGNIFICANT CHANGE UP
GAS PNL BLDV: SIGNIFICANT CHANGE UP
GLUCOSE SERPL-MCNC: 115 MG/DL — HIGH (ref 70–99)
GLUCOSE UR QL: NEGATIVE MG/DL — SIGNIFICANT CHANGE UP
HCT VFR BLD CALC: 29 % — LOW (ref 39–50)
HGB BLD-MCNC: 9.3 G/DL — LOW (ref 13–17)
IMM GRANULOCYTES NFR BLD AUTO: 0.6 % — SIGNIFICANT CHANGE UP (ref 0–0.9)
KETONES UR-MCNC: NEGATIVE MG/DL — SIGNIFICANT CHANGE UP
LEUKOCYTE ESTERASE UR-ACNC: ABNORMAL
LYMPHOCYTES # BLD AUTO: 1.04 K/UL — SIGNIFICANT CHANGE UP (ref 1–3.3)
LYMPHOCYTES # BLD AUTO: 21.5 % — SIGNIFICANT CHANGE UP (ref 13–44)
MCHC RBC-ENTMCNC: 32.1 G/DL — SIGNIFICANT CHANGE UP (ref 32–36)
MCHC RBC-ENTMCNC: 32.2 PG — SIGNIFICANT CHANGE UP (ref 27–34)
MCV RBC AUTO: 100.3 FL — HIGH (ref 80–100)
MONOCYTES # BLD AUTO: 0.57 K/UL — SIGNIFICANT CHANGE UP (ref 0–0.9)
MONOCYTES NFR BLD AUTO: 11.8 % — SIGNIFICANT CHANGE UP (ref 2–14)
NEUTROPHILS # BLD AUTO: 3.09 K/UL — SIGNIFICANT CHANGE UP (ref 1.8–7.4)
NEUTROPHILS NFR BLD AUTO: 64 % — SIGNIFICANT CHANGE UP (ref 43–77)
NITRITE UR-MCNC: NEGATIVE — SIGNIFICANT CHANGE UP
NRBC # BLD: 0 /100 WBCS — SIGNIFICANT CHANGE UP (ref 0–0)
NRBC BLD-RTO: 0 /100 WBCS — SIGNIFICANT CHANGE UP (ref 0–0)
PH UR: 5.5 — SIGNIFICANT CHANGE UP (ref 5–8)
PLATELET # BLD AUTO: 159 K/UL — SIGNIFICANT CHANGE UP (ref 150–400)
POTASSIUM SERPL-MCNC: 4.7 MMOL/L — SIGNIFICANT CHANGE UP (ref 3.5–5.3)
POTASSIUM SERPL-SCNC: 4.7 MMOL/L — SIGNIFICANT CHANGE UP (ref 3.5–5.3)
PROT SERPL-MCNC: 6.1 G/DL — SIGNIFICANT CHANGE UP (ref 6–8.3)
PROT UR-MCNC: 300 MG/DL
RBC # BLD: 2.89 M/UL — LOW (ref 4.2–5.8)
RBC # FLD: 16.4 % — HIGH (ref 10.3–14.5)
RBC CASTS # UR COMP ASSIST: SIGNIFICANT CHANGE UP /HPF (ref 0–4)
RSV RNA NPH QL NAA+NON-PROBE: SIGNIFICANT CHANGE UP
SARS-COV-2 RNA SPEC QL NAA+PROBE: SIGNIFICANT CHANGE UP
SODIUM SERPL-SCNC: 133 MMOL/L — LOW (ref 135–145)
SP GR SPEC: 1.01 — SIGNIFICANT CHANGE UP (ref 1–1.03)
UROBILINOGEN FLD QL: 0.2 MG/DL — SIGNIFICANT CHANGE UP (ref 0.2–1)
WBC # BLD: 4.83 K/UL — SIGNIFICANT CHANGE UP (ref 3.8–10.5)
WBC # FLD AUTO: 4.83 K/UL — SIGNIFICANT CHANGE UP (ref 3.8–10.5)
WBC UR QL: ABNORMAL /HPF (ref 0–5)

## 2025-01-15 PROCEDURE — 83605 ASSAY OF LACTIC ACID: CPT

## 2025-01-15 PROCEDURE — 84295 ASSAY OF SERUM SODIUM: CPT

## 2025-01-15 PROCEDURE — 71045 X-RAY EXAM CHEST 1 VIEW: CPT

## 2025-01-15 PROCEDURE — 85014 HEMATOCRIT: CPT

## 2025-01-15 PROCEDURE — 96374 THER/PROPH/DIAG INJ IV PUSH: CPT | Mod: XU

## 2025-01-15 PROCEDURE — 82947 ASSAY GLUCOSE BLOOD QUANT: CPT

## 2025-01-15 PROCEDURE — 85018 HEMOGLOBIN: CPT

## 2025-01-15 PROCEDURE — 99284 EMERGENCY DEPT VISIT MOD MDM: CPT | Mod: 25

## 2025-01-15 PROCEDURE — 84132 ASSAY OF SERUM POTASSIUM: CPT

## 2025-01-15 PROCEDURE — 82330 ASSAY OF CALCIUM: CPT

## 2025-01-15 PROCEDURE — 36415 COLL VENOUS BLD VENIPUNCTURE: CPT

## 2025-01-15 PROCEDURE — 82435 ASSAY OF BLOOD CHLORIDE: CPT

## 2025-01-15 PROCEDURE — 87086 URINE CULTURE/COLONY COUNT: CPT

## 2025-01-15 PROCEDURE — 81001 URINALYSIS AUTO W/SCOPE: CPT

## 2025-01-15 PROCEDURE — 82803 BLOOD GASES ANY COMBINATION: CPT

## 2025-01-15 PROCEDURE — 85025 COMPLETE CBC W/AUTO DIFF WBC: CPT

## 2025-01-15 PROCEDURE — 80053 COMPREHEN METABOLIC PANEL: CPT

## 2025-01-15 PROCEDURE — 87040 BLOOD CULTURE FOR BACTERIA: CPT

## 2025-01-15 PROCEDURE — 87637 SARSCOV2&INF A&B&RSV AMP PRB: CPT

## 2025-01-15 PROCEDURE — 71045 X-RAY EXAM CHEST 1 VIEW: CPT | Mod: 26

## 2025-01-15 PROCEDURE — 99285 EMERGENCY DEPT VISIT HI MDM: CPT

## 2025-01-15 RX ORDER — CEFTRIAXONE 500 MG/1
1000 INJECTION, POWDER, FOR SOLUTION INTRAMUSCULAR; INTRAVENOUS ONCE
Refills: 0 | Status: COMPLETED | OUTPATIENT
Start: 2025-01-15 | End: 2025-01-15

## 2025-01-15 RX ADMIN — CEFTRIAXONE 100 MILLIGRAM(S): 500 INJECTION, POWDER, FOR SOLUTION INTRAMUSCULAR; INTRAVENOUS at 17:25

## 2025-01-16 LAB
CULTURE RESULTS: SIGNIFICANT CHANGE UP
SPECIMEN SOURCE: SIGNIFICANT CHANGE UP

## 2025-01-27 ENCOUNTER — APPOINTMENT (OUTPATIENT)
Dept: UROLOGY | Facility: CLINIC | Age: 80
End: 2025-01-27
Payer: COMMERCIAL

## 2025-01-27 VITALS
HEIGHT: 73 IN | OXYGEN SATURATION: 96 % | SYSTOLIC BLOOD PRESSURE: 120 MMHG | BODY MASS INDEX: 18.29 KG/M2 | HEART RATE: 87 BPM | WEIGHT: 138 LBS | TEMPERATURE: 97.3 F | DIASTOLIC BLOOD PRESSURE: 78 MMHG

## 2025-01-27 DIAGNOSIS — N20.0 CALCULUS OF KIDNEY: ICD-10-CM

## 2025-01-27 DIAGNOSIS — R33.9 RETENTION OF URINE, UNSPECIFIED: ICD-10-CM

## 2025-01-27 PROCEDURE — 99204 OFFICE O/P NEW MOD 45 MIN: CPT

## 2025-01-28 ENCOUNTER — NON-APPOINTMENT (OUTPATIENT)
Age: 80
End: 2025-01-28

## 2025-01-28 DIAGNOSIS — Z87.448 PERSONAL HISTORY OF OTHER DISEASES OF URINARY SYSTEM: ICD-10-CM

## 2025-01-28 DIAGNOSIS — N28.9 DISORDER OF KIDNEY AND URETER, UNSPECIFIED: ICD-10-CM

## 2025-01-28 LAB
APPEARANCE: CLEAR
BACTERIA: ABNORMAL /HPF
BILIRUBIN URINE: NEGATIVE
BLOOD URINE: ABNORMAL
CAST: 0 /LPF
COLOR: YELLOW
EPITHELIAL CELLS: 3 /HPF
GLUCOSE QUALITATIVE U: NEGATIVE MG/DL
KETONES URINE: NEGATIVE MG/DL
LEUKOCYTE ESTERASE URINE: ABNORMAL
MICROSCOPIC-UA: NORMAL
NITRITE URINE: NEGATIVE
PH URINE: 7
PROTEIN URINE: 300 MG/DL
RED BLOOD CELLS URINE: 16 /HPF
SPECIFIC GRAVITY URINE: 1.02
UROBILINOGEN URINE: 0.2 MG/DL
WHITE BLOOD CELLS URINE: 67 /HPF

## 2025-01-29 LAB — BACTERIA UR CULT: NORMAL

## 2025-02-03 ENCOUNTER — APPOINTMENT (OUTPATIENT)
Dept: UROLOGY | Facility: CLINIC | Age: 80
End: 2025-02-03
Payer: COMMERCIAL

## 2025-02-03 VITALS
WEIGHT: 138 LBS | RESPIRATION RATE: 16 BRPM | HEIGHT: 73 IN | SYSTOLIC BLOOD PRESSURE: 137 MMHG | HEART RATE: 73 BPM | DIASTOLIC BLOOD PRESSURE: 87 MMHG | TEMPERATURE: 97.2 F | BODY MASS INDEX: 18.29 KG/M2 | OXYGEN SATURATION: 95 %

## 2025-02-03 DIAGNOSIS — N40.1 BENIGN PROSTATIC HYPERPLASIA WITH LOWER URINARY TRACT SYMPMS: ICD-10-CM

## 2025-02-03 DIAGNOSIS — N20.0 CALCULUS OF KIDNEY: ICD-10-CM

## 2025-02-03 DIAGNOSIS — R33.9 RETENTION OF URINE, UNSPECIFIED: ICD-10-CM

## 2025-02-03 DIAGNOSIS — R35.1 BENIGN PROSTATIC HYPERPLASIA WITH LOWER URINARY TRACT SYMPMS: ICD-10-CM

## 2025-02-03 PROCEDURE — 51784 ANAL/URINARY MUSCLE STUDY: CPT

## 2025-02-03 PROCEDURE — 51797 INTRAABDOMINAL PRESSURE TEST: CPT

## 2025-02-03 PROCEDURE — 51728 CYSTOMETROGRAM W/VP: CPT

## 2025-02-04 ENCOUNTER — APPOINTMENT (OUTPATIENT)
Dept: OPHTHALMOLOGY | Facility: CLINIC | Age: 80
End: 2025-02-04

## 2025-02-10 ENCOUNTER — APPOINTMENT (OUTPATIENT)
Dept: OPHTHALMOLOGY | Facility: CLINIC | Age: 80
End: 2025-02-10

## 2025-02-13 ENCOUNTER — APPOINTMENT (OUTPATIENT)
Dept: UROLOGY | Facility: CLINIC | Age: 80
End: 2025-02-13

## 2025-03-17 NOTE — PHYSICAL THERAPY INITIAL EVALUATION ADULT - LEVEL OF CONSCIOUSNESS, REHAB EVAL
1  Amoxicillin twice daily for 10 days.   2. Albuterol inhaler at least 3 times daily for next 5 days or every 4 to 6 hours as needed.   3.  Childrens acetaminophen and/or ibuprofen as directed for pain/fever.       Follow up with your primary care provider if your symptoms fail to improve and resolve as anticipated    Go to the Immediate Care or Emergency Department in event of new or worsening symptoms at any time     
alert